# Patient Record
Sex: MALE | Race: WHITE | Employment: OTHER | URBAN - METROPOLITAN AREA
[De-identification: names, ages, dates, MRNs, and addresses within clinical notes are randomized per-mention and may not be internally consistent; named-entity substitution may affect disease eponyms.]

---

## 2022-08-01 ENCOUNTER — APPOINTMENT (OUTPATIENT)
Dept: GENERAL RADIOLOGY | Age: 47
DRG: 315 | End: 2022-08-01
Payer: COMMERCIAL

## 2022-08-01 ENCOUNTER — ANESTHESIA (OUTPATIENT)
Dept: OPERATING ROOM | Age: 47
DRG: 315 | End: 2022-08-01
Payer: COMMERCIAL

## 2022-08-01 ENCOUNTER — HOSPITAL ENCOUNTER (INPATIENT)
Age: 47
LOS: 1 days | Discharge: HOME | DRG: 315 | End: 2022-08-02
Attending: EMERGENCY MEDICINE | Admitting: ORTHOPAEDIC SURGERY
Payer: COMMERCIAL

## 2022-08-01 ENCOUNTER — ANESTHESIA EVENT (OUTPATIENT)
Dept: OPERATING ROOM | Age: 47
DRG: 315 | End: 2022-08-01
Payer: COMMERCIAL

## 2022-08-01 DIAGNOSIS — S62.609B OPEN FRACTURE OF PHALANX OF DIGIT OF HAND, INITIAL ENCOUNTER: Primary | ICD-10-CM

## 2022-08-01 DIAGNOSIS — S61.401A DEGLOVING INJURY OF RIGHT HAND, INITIAL ENCOUNTER: ICD-10-CM

## 2022-08-01 PROBLEM — S62.91XB: Status: ACTIVE | Noted: 2022-08-01

## 2022-08-01 PROBLEM — S62.306B: Status: ACTIVE | Noted: 2022-08-01

## 2022-08-01 PROBLEM — S62.314B: Status: ACTIVE | Noted: 2022-08-01

## 2022-08-01 LAB
ABO/RH: NORMAL
ANTIBODY SCREEN: NORMAL
HCT VFR BLD CALC: 44.2 % (ref 37–54)
HEMOGLOBIN: 14.9 G/DL (ref 12.5–16.5)
MCH RBC QN AUTO: 31 PG (ref 26–35)
MCHC RBC AUTO-ENTMCNC: 33.7 % (ref 32–34.5)
MCV RBC AUTO: 91.9 FL (ref 80–99.9)
PDW BLD-RTO: 12 FL (ref 11.5–15)
PLATELET # BLD: 267 E9/L (ref 130–450)
PMV BLD AUTO: 9.2 FL (ref 7–12)
RBC # BLD: 4.81 E12/L (ref 3.8–5.8)
WBC # BLD: 18.4 E9/L (ref 4.5–11.5)

## 2022-08-01 PROCEDURE — 6360000002 HC RX W HCPCS: Performed by: EMERGENCY MEDICINE

## 2022-08-01 PROCEDURE — 13133 CMPLX RPR F/C/C/M/N/AX/G/H/F: CPT | Performed by: ORTHOPAEDIC SURGERY

## 2022-08-01 PROCEDURE — 7100000001 HC PACU RECOVERY - ADDTL 15 MIN: Performed by: ORTHOPAEDIC SURGERY

## 2022-08-01 PROCEDURE — 3600000002 HC SURGERY LEVEL 2 BASE: Performed by: ORTHOPAEDIC SURGERY

## 2022-08-01 PROCEDURE — 11012 DEB SKIN BONE AT FX SITE: CPT | Performed by: ORTHOPAEDIC SURGERY

## 2022-08-01 PROCEDURE — 26615 TREAT METACARPAL FRACTURE: CPT | Performed by: ORTHOPAEDIC SURGERY

## 2022-08-01 PROCEDURE — 0HQFXZZ REPAIR RIGHT HAND SKIN, EXTERNAL APPROACH: ICD-10-PCS | Performed by: ORTHOPAEDIC SURGERY

## 2022-08-01 PROCEDURE — 3700000001 HC ADD 15 MINUTES (ANESTHESIA): Performed by: ORTHOPAEDIC SURGERY

## 2022-08-01 PROCEDURE — 2720000010 HC SURG SUPPLY STERILE: Performed by: ORTHOPAEDIC SURGERY

## 2022-08-01 PROCEDURE — 86901 BLOOD TYPING SEROLOGIC RH(D): CPT

## 2022-08-01 PROCEDURE — 6360000002 HC RX W HCPCS

## 2022-08-01 PROCEDURE — 73110 X-RAY EXAM OF WRIST: CPT

## 2022-08-01 PROCEDURE — 6360000002 HC RX W HCPCS: Performed by: ANESTHESIOLOGY

## 2022-08-01 PROCEDURE — 6360000002 HC RX W HCPCS: Performed by: STUDENT IN AN ORGANIZED HEALTH CARE EDUCATION/TRAINING PROGRAM

## 2022-08-01 PROCEDURE — 1200000000 HC SEMI PRIVATE

## 2022-08-01 PROCEDURE — 26727 TREAT FINGER FRACTURE EACH: CPT | Performed by: ORTHOPAEDIC SURGERY

## 2022-08-01 PROCEDURE — C1713 ANCHOR/SCREW BN/BN,TIS/BN: HCPCS | Performed by: ORTHOPAEDIC SURGERY

## 2022-08-01 PROCEDURE — 0PSP04Z REPOSITION RIGHT METACARPAL WITH INTERNAL FIXATION DEVICE, OPEN APPROACH: ICD-10-PCS | Performed by: ORTHOPAEDIC SURGERY

## 2022-08-01 PROCEDURE — 86850 RBC ANTIBODY SCREEN: CPT

## 2022-08-01 PROCEDURE — 96375 TX/PRO/DX INJ NEW DRUG ADDON: CPT

## 2022-08-01 PROCEDURE — 7100000000 HC PACU RECOVERY - FIRST 15 MIN: Performed by: ORTHOPAEDIC SURGERY

## 2022-08-01 PROCEDURE — 3600000012 HC SURGERY LEVEL 2 ADDTL 15MIN: Performed by: ORTHOPAEDIC SURGERY

## 2022-08-01 PROCEDURE — 36415 COLL VENOUS BLD VENIPUNCTURE: CPT

## 2022-08-01 PROCEDURE — 2580000003 HC RX 258: Performed by: STUDENT IN AN ORGANIZED HEALTH CARE EDUCATION/TRAINING PROGRAM

## 2022-08-01 PROCEDURE — 0PST04Z REPOSITION RIGHT FINGER PHALANX WITH INTERNAL FIXATION DEVICE, OPEN APPROACH: ICD-10-PCS | Performed by: ORTHOPAEDIC SURGERY

## 2022-08-01 PROCEDURE — 3700000000 HC ANESTHESIA ATTENDED CARE: Performed by: ORTHOPAEDIC SURGERY

## 2022-08-01 PROCEDURE — 2709999900 HC NON-CHARGEABLE SUPPLY: Performed by: ORTHOPAEDIC SURGERY

## 2022-08-01 PROCEDURE — 2580000003 HC RX 258

## 2022-08-01 PROCEDURE — 99223 1ST HOSP IP/OBS HIGH 75: CPT | Performed by: ORTHOPAEDIC SURGERY

## 2022-08-01 PROCEDURE — 3209999900 FLUORO FOR SURGICAL PROCEDURES

## 2022-08-01 PROCEDURE — 2500000003 HC RX 250 WO HCPCS

## 2022-08-01 PROCEDURE — C1769 GUIDE WIRE: HCPCS | Performed by: ORTHOPAEDIC SURGERY

## 2022-08-01 PROCEDURE — 86900 BLOOD TYPING SEROLOGIC ABO: CPT

## 2022-08-01 PROCEDURE — 0MQ70ZZ REPAIR RIGHT HAND BURSA AND LIGAMENT, OPEN APPROACH: ICD-10-PCS | Performed by: ORTHOPAEDIC SURGERY

## 2022-08-01 PROCEDURE — 73130 X-RAY EXAM OF HAND: CPT

## 2022-08-01 PROCEDURE — 0PST34Z REPOSITION RIGHT FINGER PHALANX WITH INTERNAL FIXATION DEVICE, PERCUTANEOUS APPROACH: ICD-10-PCS | Performed by: ORTHOPAEDIC SURGERY

## 2022-08-01 PROCEDURE — 99285 EMERGENCY DEPT VISIT HI MDM: CPT

## 2022-08-01 PROCEDURE — 85027 COMPLETE CBC AUTOMATED: CPT

## 2022-08-01 PROCEDURE — 26540 REPAIR HAND JOINT: CPT | Performed by: ORTHOPAEDIC SURGERY

## 2022-08-01 PROCEDURE — 96374 THER/PROPH/DIAG INJ IV PUSH: CPT

## 2022-08-01 PROCEDURE — 13132 CMPLX RPR F/C/C/M/N/AX/G/H/F: CPT | Performed by: ORTHOPAEDIC SURGERY

## 2022-08-01 DEVICE — IMPLANTABLE DEVICE: Type: IMPLANTABLE DEVICE | Site: HAND | Status: FUNCTIONAL

## 2022-08-01 DEVICE — SCREW BNE L13MM DIA2MM HND S STL ST LOK VAR ANG T6 STARDRV: Type: IMPLANTABLE DEVICE | Site: HAND | Status: FUNCTIONAL

## 2022-08-01 DEVICE — SCREW BNE L18MM DIA2MM HND 316L S STL ST VAR ANG LOK T6: Type: IMPLANTABLE DEVICE | Site: HAND | Status: FUNCTIONAL

## 2022-08-01 DEVICE — SCREW BNE L11MM DIA2MM CORT S STL ST LOK FULL THRD T6: Type: IMPLANTABLE DEVICE | Site: HAND | Status: FUNCTIONAL

## 2022-08-01 DEVICE — SCREW BNE L14MM DIA2MM HND 316L S STL ST VAR ANG LOK T6: Type: IMPLANTABLE DEVICE | Site: HAND | Status: FUNCTIONAL

## 2022-08-01 DEVICE — SCREW BNE L10MM DIA2MM CORT S STL ST LOK FULL THRD T6: Type: IMPLANTABLE DEVICE | Site: HAND | Status: FUNCTIONAL

## 2022-08-01 RX ORDER — LIDOCAINE HYDROCHLORIDE 20 MG/ML
INJECTION, SOLUTION INTRAVENOUS PRN
Status: DISCONTINUED | OUTPATIENT
Start: 2022-08-01 | End: 2022-08-01 | Stop reason: SDUPTHER

## 2022-08-01 RX ORDER — ESMOLOL HYDROCHLORIDE 10 MG/ML
INJECTION INTRAVENOUS PRN
Status: DISCONTINUED | OUTPATIENT
Start: 2022-08-01 | End: 2022-08-01 | Stop reason: SDUPTHER

## 2022-08-01 RX ORDER — TETANUS AND DIPHTHERIA TOXOIDS ADSORBED 2; 2 [LF]/.5ML; [LF]/.5ML
0.5 INJECTION INTRAMUSCULAR ONCE
Status: DISCONTINUED | OUTPATIENT
Start: 2022-08-01 | End: 2022-08-02 | Stop reason: HOSPADM

## 2022-08-01 RX ORDER — NEOSTIGMINE METHYLSULFATE 1 MG/ML
INJECTION, SOLUTION INTRAVENOUS PRN
Status: DISCONTINUED | OUTPATIENT
Start: 2022-08-01 | End: 2022-08-01 | Stop reason: SDUPTHER

## 2022-08-01 RX ORDER — SODIUM CHLORIDE 9 MG/ML
INJECTION, SOLUTION INTRAVENOUS CONTINUOUS PRN
Status: DISCONTINUED | OUTPATIENT
Start: 2022-08-01 | End: 2022-08-01 | Stop reason: SDUPTHER

## 2022-08-01 RX ORDER — OXYCODONE HYDROCHLORIDE AND ACETAMINOPHEN 5; 325 MG/1; MG/1
1 TABLET ORAL EVERY 6 HOURS PRN
Qty: 28 TABLET | Refills: 0 | Status: SHIPPED | OUTPATIENT
Start: 2022-08-01 | End: 2022-08-16 | Stop reason: SDUPTHER

## 2022-08-01 RX ORDER — FENTANYL CITRATE 50 UG/ML
INJECTION, SOLUTION INTRAMUSCULAR; INTRAVENOUS PRN
Status: DISCONTINUED | OUTPATIENT
Start: 2022-08-01 | End: 2022-08-01 | Stop reason: SDUPTHER

## 2022-08-01 RX ORDER — SODIUM CHLORIDE 0.9 % (FLUSH) 0.9 %
5-40 SYRINGE (ML) INJECTION EVERY 12 HOURS SCHEDULED
Status: DISCONTINUED | OUTPATIENT
Start: 2022-08-01 | End: 2022-08-02 | Stop reason: HOSPADM

## 2022-08-01 RX ORDER — FENTANYL CITRATE 50 UG/ML
50 INJECTION, SOLUTION INTRAMUSCULAR; INTRAVENOUS
Status: DISCONTINUED | OUTPATIENT
Start: 2022-08-01 | End: 2022-08-02 | Stop reason: HOSPADM

## 2022-08-01 RX ORDER — GLYCOPYRROLATE 0.2 MG/ML
INJECTION INTRAMUSCULAR; INTRAVENOUS PRN
Status: DISCONTINUED | OUTPATIENT
Start: 2022-08-01 | End: 2022-08-01 | Stop reason: SDUPTHER

## 2022-08-01 RX ORDER — ONDANSETRON 2 MG/ML
4 INJECTION INTRAMUSCULAR; INTRAVENOUS
Status: ACTIVE | OUTPATIENT
Start: 2022-08-01 | End: 2022-08-01

## 2022-08-01 RX ORDER — PROPOFOL 10 MG/ML
INJECTION, EMULSION INTRAVENOUS PRN
Status: DISCONTINUED | OUTPATIENT
Start: 2022-08-01 | End: 2022-08-01 | Stop reason: SDUPTHER

## 2022-08-01 RX ORDER — DEXAMETHASONE SODIUM PHOSPHATE 10 MG/ML
INJECTION INTRAMUSCULAR; INTRAVENOUS PRN
Status: DISCONTINUED | OUTPATIENT
Start: 2022-08-01 | End: 2022-08-01 | Stop reason: SDUPTHER

## 2022-08-01 RX ORDER — SODIUM CHLORIDE 0.9 % (FLUSH) 0.9 %
5-40 SYRINGE (ML) INJECTION PRN
Status: DISCONTINUED | OUTPATIENT
Start: 2022-08-01 | End: 2022-08-02 | Stop reason: HOSPADM

## 2022-08-01 RX ORDER — ROCURONIUM BROMIDE 10 MG/ML
INJECTION, SOLUTION INTRAVENOUS PRN
Status: DISCONTINUED | OUTPATIENT
Start: 2022-08-01 | End: 2022-08-01 | Stop reason: SDUPTHER

## 2022-08-01 RX ORDER — MORPHINE SULFATE 4 MG/ML
4 INJECTION, SOLUTION INTRAMUSCULAR; INTRAVENOUS ONCE
Status: COMPLETED | OUTPATIENT
Start: 2022-08-01 | End: 2022-08-01

## 2022-08-01 RX ORDER — ONDANSETRON 2 MG/ML
INJECTION INTRAMUSCULAR; INTRAVENOUS PRN
Status: DISCONTINUED | OUTPATIENT
Start: 2022-08-01 | End: 2022-08-01 | Stop reason: SDUPTHER

## 2022-08-01 RX ORDER — FENTANYL CITRATE 50 UG/ML
INJECTION, SOLUTION INTRAMUSCULAR; INTRAVENOUS
Status: COMPLETED
Start: 2022-08-01 | End: 2022-08-01

## 2022-08-01 RX ORDER — CEFAZOLIN SODIUM 1 G/3ML
INJECTION, POWDER, FOR SOLUTION INTRAMUSCULAR; INTRAVENOUS PRN
Status: DISCONTINUED | OUTPATIENT
Start: 2022-08-01 | End: 2022-08-01 | Stop reason: SDUPTHER

## 2022-08-01 RX ORDER — SUCCINYLCHOLINE/SOD CL,ISO/PF 200MG/10ML
SYRINGE (ML) INTRAVENOUS PRN
Status: DISCONTINUED | OUTPATIENT
Start: 2022-08-01 | End: 2022-08-01 | Stop reason: SDUPTHER

## 2022-08-01 RX ORDER — SODIUM CHLORIDE 9 MG/ML
INJECTION, SOLUTION INTRAVENOUS PRN
Status: DISCONTINUED | OUTPATIENT
Start: 2022-08-01 | End: 2022-08-02 | Stop reason: HOSPADM

## 2022-08-01 RX ORDER — MIDAZOLAM HYDROCHLORIDE 1 MG/ML
INJECTION INTRAMUSCULAR; INTRAVENOUS PRN
Status: DISCONTINUED | OUTPATIENT
Start: 2022-08-01 | End: 2022-08-01 | Stop reason: SDUPTHER

## 2022-08-01 RX ADMIN — PROPOFOL 200 MG: 10 INJECTION, EMULSION INTRAVENOUS at 20:42

## 2022-08-01 RX ADMIN — MIDAZOLAM 2 MG: 1 INJECTION INTRAMUSCULAR; INTRAVENOUS at 20:32

## 2022-08-01 RX ADMIN — CEFAZOLIN 2000 MG: 1 INJECTION, POWDER, FOR SOLUTION INTRAMUSCULAR; INTRAVENOUS at 20:42

## 2022-08-01 RX ADMIN — SODIUM CHLORIDE: 9 INJECTION, SOLUTION INTRAVENOUS at 20:32

## 2022-08-01 RX ADMIN — GLYCOPYRROLATE 0.6 MG: 0.2 INJECTION, SOLUTION INTRAMUSCULAR; INTRAVENOUS at 22:08

## 2022-08-01 RX ADMIN — ROCURONIUM BROMIDE 30 MG: 10 INJECTION, SOLUTION INTRAVENOUS at 20:48

## 2022-08-01 RX ADMIN — ONDANSETRON 4 MG: 2 INJECTION INTRAMUSCULAR; INTRAVENOUS at 21:57

## 2022-08-01 RX ADMIN — FENTANYL CITRATE 50 MCG: 50 INJECTION, SOLUTION INTRAMUSCULAR; INTRAVENOUS at 17:59

## 2022-08-01 RX ADMIN — Medication 140 MG: at 20:42

## 2022-08-01 RX ADMIN — PHENYLEPHRINE HYDROCHLORIDE 100 MCG: 10 INJECTION INTRAVENOUS at 21:39

## 2022-08-01 RX ADMIN — FENTANYL CITRATE 150 MCG: 50 INJECTION, SOLUTION INTRAMUSCULAR; INTRAVENOUS at 20:46

## 2022-08-01 RX ADMIN — LIDOCAINE HYDROCHLORIDE 100 MG: 20 INJECTION, SOLUTION INTRAVENOUS at 20:42

## 2022-08-01 RX ADMIN — HYDROMORPHONE HYDROCHLORIDE 0.5 MG: 1 INJECTION, SOLUTION INTRAMUSCULAR; INTRAVENOUS; SUBCUTANEOUS at 23:27

## 2022-08-01 RX ADMIN — CEFAZOLIN 2000 MG: 2 INJECTION, POWDER, FOR SOLUTION INTRAMUSCULAR; INTRAVENOUS at 17:03

## 2022-08-01 RX ADMIN — PHENYLEPHRINE HYDROCHLORIDE 100 MCG: 10 INJECTION INTRAVENOUS at 21:46

## 2022-08-01 RX ADMIN — DEXAMETHASONE SODIUM PHOSPHATE 10 MG: 10 INJECTION INTRAMUSCULAR; INTRAVENOUS at 20:42

## 2022-08-01 RX ADMIN — FENTANYL CITRATE 100 MCG: 50 INJECTION, SOLUTION INTRAMUSCULAR; INTRAVENOUS at 20:36

## 2022-08-01 RX ADMIN — MORPHINE SULFATE 4 MG: 4 INJECTION, SOLUTION INTRAMUSCULAR; INTRAVENOUS at 17:00

## 2022-08-01 RX ADMIN — HYDROMORPHONE HYDROCHLORIDE 0.5 MG: 1 INJECTION, SOLUTION INTRAMUSCULAR; INTRAVENOUS; SUBCUTANEOUS at 23:34

## 2022-08-01 RX ADMIN — ROCURONIUM BROMIDE 10 MG: 10 INJECTION, SOLUTION INTRAVENOUS at 20:42

## 2022-08-01 RX ADMIN — Medication 3 MG: at 22:08

## 2022-08-01 RX ADMIN — ESMOLOL HYDROCHLORIDE 50 MG: 10 INJECTION, SOLUTION INTRAVENOUS at 20:47

## 2022-08-01 ASSESSMENT — ENCOUNTER SYMPTOMS
SINUS PRESSURE: 0
WHEEZING: 0
COUGH: 0
EYE REDNESS: 0
VOMITING: 0
SORE THROAT: 0
ABDOMINAL PAIN: 0
EYE PAIN: 0
NAUSEA: 0
DIARRHEA: 0
EYE DISCHARGE: 0
SHORTNESS OF BREATH: 0
BACK PAIN: 0

## 2022-08-01 ASSESSMENT — PAIN DESCRIPTION - LOCATION: LOCATION: HAND

## 2022-08-01 ASSESSMENT — PAIN DESCRIPTION - FREQUENCY: FREQUENCY: INTERMITTENT

## 2022-08-01 ASSESSMENT — PAIN DESCRIPTION - PAIN TYPE: TYPE: SURGICAL PAIN

## 2022-08-01 ASSESSMENT — PAIN DESCRIPTION - ONSET: ONSET: ON-GOING

## 2022-08-01 ASSESSMENT — PAIN SCALES - GENERAL
PAINLEVEL_OUTOF10: 9
PAINLEVEL_OUTOF10: 10
PAINLEVEL_OUTOF10: 0
PAINLEVEL_OUTOF10: 0

## 2022-08-01 ASSESSMENT — PAIN DESCRIPTION - DESCRIPTORS: DESCRIPTORS: ACHING;BURNING;CRAMPING

## 2022-08-01 ASSESSMENT — PAIN DESCRIPTION - ORIENTATION: ORIENTATION: RIGHT

## 2022-08-01 NOTE — LETTER
[address] [address] [facility]  Phone: [phone]  Fax: [phone]    Dr. Anselmo Molina      August 2, 2022     Patient: Umu Mcgowan   YOB: 1975   Date of Visit: 8/1/2022       To Whom It May Concern:    Patients father was admitted for medical reason, son was required to stay with father due to severity and location. If you have any questions or concerns, please don't hesitate to call.     Sincerely,        Dr. Anselmo Song
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
## 2022-08-01 NOTE — ANESTHESIA PRE PROCEDURE
Department of Anesthesiology  Preprocedure Note       Name:  Taz Mancia   Age:  48 y.o.  :  1975                                          MRN:  [id number]         Date:  2022      Surgeon: Raeann Signs):  Shayy Fedler MD    Procedure: Procedure(s):  HAND INCISION AND DRAINAGE  HAND OPEN REDUCTION INTERNAL FIXATION    Medications prior to admission:   Prior to Admission medications    Medication Sig Start Date End Date Taking? Authorizing Provider   lisinopril (PRINIVIL;ZESTRIL) 20 MG tablet Take 20 mg by mouth daily    Historical Provider, MD   pantoprazole (PROTONIX) 40 MG tablet Take 40 mg by mouth daily    Historical Provider, MD       Current medications:    Current Facility-Administered Medications   Medication Dose Route Frequency Provider Last Rate Last Admin    diptheria-tetanus toxoids [facility]) 2-2 LF/0.5ML injection 0.5 mL  0.5 mL IntraMUSCular Once Alan Sole, DO        fentaNYL (SUBLIMAZE) injection 50 mcg  50 mcg IntraVENous Q1H PRN Jane Valdezo, DO   50 mcg at 22 4263     Current Outpatient Medications   Medication Sig Dispense Refill    lisinopril (PRINIVIL;ZESTRIL) 20 MG tablet Take 20 mg by mouth daily      pantoprazole (PROTONIX) 40 MG tablet Take 40 mg by mouth daily         Allergies:  No Known Allergies    Problem List:  There is no problem list on file for this patient. Past Medical History:        Diagnosis Date    GERD (gastroesophageal reflux disease)     Hypertension     Kidney stone     MI, old        Past Surgical History:  No past surgical history on file.     Social History:    Social History     Tobacco Use    Smoking status: Never    Smokeless tobacco: Not on file   Substance Use Topics    Alcohol use: Yes     Comment: rarely                                Counseling given: Not Answered      Vital Signs (Current):   Vitals:    22 1627 22 1629 22 1802   BP: (!) 140/93     Pulse: 83  73   Resp: 18     Temp: 36.8 °C (98.3 °F) SpO2: 95%  97%   Weight:  265 lb (120.2 kg)    Height:  5' 11\" (1.803 m)                                               BP Readings from Last 3 Encounters:   08/01/22 (!) 140/93   12/03/16 (!) 137/94       NPO Status:  1300 8/1/22                                                                               BMI:   Wt Readings from Last 3 Encounters:   08/01/22 265 lb (120.2 kg)   12/03/16 246 lb (111.6 kg)     Body mass index is 36.96 kg/m². CBC:   Lab Results   Component Value Date/Time    WBC 11.1 12/03/2016 09:30 PM    RBC 5.07 12/03/2016 09:30 PM    HGB 15.7 12/03/2016 09:30 PM    HCT 45.7 12/03/2016 09:30 PM    MCV 90.2 12/03/2016 09:30 PM    RDW 11.9 12/03/2016 09:30 PM     12/03/2016 09:30 PM       CMP:   Lab Results   Component Value Date/Time     12/03/2016 09:30 PM    K 3.7 12/03/2016 09:30 PM     12/03/2016 09:30 PM    CO2 23 12/03/2016 09:30 PM    BUN 13 12/03/2016 09:30 PM    CREATININE 1.02 12/03/2016 09:30 PM    GFRAA >60.0 12/03/2016 09:30 PM    LABGLOM >60.0 12/03/2016 09:30 PM    GLUCOSE 157 12/03/2016 09:30 PM    PROT 7.4 12/03/2016 09:30 PM    CALCIUM 9.3 12/03/2016 09:30 PM    BILITOT 0.3 12/03/2016 09:30 PM    ALKPHOS 91 12/03/2016 09:30 PM    AST 17 12/03/2016 09:30 PM    ALT 38 12/03/2016 09:30 PM       POC Tests: No results for input(s): POCGLU, POCNA, POCK, POCCL, POCBUN, POCHEMO, POCHCT in the last 72 hours. Coags: No results found for: PROTIME, INR, APTT    HCG (If Applicable): No results found for: PREGTESTUR, PREGSERUM, HCG, HCGQUANT     ABGs: No results found for: PHART, PO2ART, LDV4DNB, RXD6SOO, BEART, N5FKLDHK     Type & Screen (If Applicable):  No results found for: LABABO, LABRH    Drug/Infectious Status (If Applicable):  No results found for: HIV, HEPCAB    COVID-19 Screening (If Applicable): No results found for: COVID19        Anesthesia Evaluation  Patient summary reviewed   History of anesthetic complications: No previous anesthesa.   Airway: Mallampati: IV  TM distance: >3 FB     Mouth opening: > = 3 FB   Dental:      Comment: Denies loose teeth      Pulmonary:normal exam    (+) sleep apnea: on CPAP,                             Cardiovascular:  Exercise tolerance: good (>4 METS),   (+) hypertension:,     Past MI: patient denies. Rhythm: regular  Rate: normal           Beta Blocker:  Not on Beta Blocker         Neuro/Psych:   Negative Neuro/Psych ROS              GI/Hepatic/Renal:   (+) GERD: well controlled,           Endo/Other: Negative Endo/Other ROS                    Abdominal:   (+) obese,           Vascular: negative vascular ROS. Other Findings:           Anesthesia Plan      general     ASA 3       Induction: intravenous. MIPS: Prophylactic antiemetics administered. Anesthetic plan and risks discussed with patient. Use of blood products discussed with patient whom consented to blood products. Plan discussed with CRNA and attending.                     Himanshu Mosher RN   8/1/2022
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
## 2022-08-02 VITALS
RESPIRATION RATE: 16 BRPM | BODY MASS INDEX: 37.1 KG/M2 | TEMPERATURE: 98.5 F | WEIGHT: 265 LBS | DIASTOLIC BLOOD PRESSURE: 77 MMHG | OXYGEN SATURATION: 91 % | HEIGHT: 71 IN | SYSTOLIC BLOOD PRESSURE: 145 MMHG | HEART RATE: 117 BPM

## 2022-08-02 LAB
ANION GAP SERPL CALCULATED.3IONS-SCNC: 13 MMOL/L (ref 7–16)
BASOPHILS ABSOLUTE: 0.02 E9/L (ref 0–0.2)
BASOPHILS RELATIVE PERCENT: 0.1 % (ref 0–2)
BUN BLDV-MCNC: 14 MG/DL (ref 6–20)
CALCIUM SERPL-MCNC: 8.8 MG/DL (ref 8.6–10.2)
CHLORIDE BLD-SCNC: 107 MMOL/L (ref 98–107)
CO2: 18 MMOL/L (ref 22–29)
CREAT SERPL-MCNC: 1.1 MG/DL (ref 0.7–1.2)
EOSINOPHILS ABSOLUTE: 0 E9/L (ref 0.05–0.5)
EOSINOPHILS RELATIVE PERCENT: 0 % (ref 0–6)
GFR AFRICAN AMERICAN: >60
GFR NON-AFRICAN AMERICAN: >60 ML/MIN/1.73
GLUCOSE BLD-MCNC: 178 MG/DL (ref 74–99)
HCT VFR BLD CALC: 42.2 % (ref 37–54)
HEMOGLOBIN: 14.1 G/DL (ref 12.5–16.5)
IMMATURE GRANULOCYTES #: 0.11 E9/L
IMMATURE GRANULOCYTES %: 0.6 % (ref 0–5)
LYMPHOCYTES ABSOLUTE: 0.75 E9/L (ref 1.5–4)
LYMPHOCYTES RELATIVE PERCENT: 4.2 % (ref 20–42)
MCH RBC QN AUTO: 30.9 PG (ref 26–35)
MCHC RBC AUTO-ENTMCNC: 33.4 % (ref 32–34.5)
MCV RBC AUTO: 92.5 FL (ref 80–99.9)
MONOCYTES ABSOLUTE: 0.58 E9/L (ref 0.1–0.95)
MONOCYTES RELATIVE PERCENT: 3.2 % (ref 2–12)
NEUTROPHILS ABSOLUTE: 16.59 E9/L (ref 1.8–7.3)
NEUTROPHILS RELATIVE PERCENT: 91.9 % (ref 43–80)
PDW BLD-RTO: 12.2 FL (ref 11.5–15)
PLATELET # BLD: 289 E9/L (ref 130–450)
PMV BLD AUTO: 9.3 FL (ref 7–12)
POTASSIUM REFLEX MAGNESIUM: 4.9 MMOL/L (ref 3.5–5)
RBC # BLD: 4.56 E12/L (ref 3.8–5.8)
SODIUM BLD-SCNC: 138 MMOL/L (ref 132–146)
WBC # BLD: 18.1 E9/L (ref 4.5–11.5)

## 2022-08-02 PROCEDURE — 97530 THERAPEUTIC ACTIVITIES: CPT

## 2022-08-02 PROCEDURE — 97535 SELF CARE MNGMENT TRAINING: CPT

## 2022-08-02 PROCEDURE — 6370000000 HC RX 637 (ALT 250 FOR IP)

## 2022-08-02 PROCEDURE — 85025 COMPLETE CBC W/AUTO DIFF WBC: CPT

## 2022-08-02 PROCEDURE — 80048 BASIC METABOLIC PNL TOTAL CA: CPT

## 2022-08-02 PROCEDURE — 36415 COLL VENOUS BLD VENIPUNCTURE: CPT

## 2022-08-02 PROCEDURE — 6360000002 HC RX W HCPCS: Performed by: STUDENT IN AN ORGANIZED HEALTH CARE EDUCATION/TRAINING PROGRAM

## 2022-08-02 PROCEDURE — 97161 PT EVAL LOW COMPLEX 20 MIN: CPT

## 2022-08-02 PROCEDURE — 2580000003 HC RX 258: Performed by: ORTHOPAEDIC SURGERY

## 2022-08-02 PROCEDURE — 6360000002 HC RX W HCPCS

## 2022-08-02 PROCEDURE — 97165 OT EVAL LOW COMPLEX 30 MIN: CPT

## 2022-08-02 PROCEDURE — 2580000003 HC RX 258: Performed by: STUDENT IN AN ORGANIZED HEALTH CARE EDUCATION/TRAINING PROGRAM

## 2022-08-02 RX ORDER — OXYCODONE HYDROCHLORIDE 5 MG/1
5 TABLET ORAL EVERY 4 HOURS PRN
Status: DISCONTINUED | OUTPATIENT
Start: 2022-08-02 | End: 2022-08-02 | Stop reason: HOSPADM

## 2022-08-02 RX ORDER — MORPHINE SULFATE 4 MG/ML
4 INJECTION, SOLUTION INTRAMUSCULAR; INTRAVENOUS
Status: DISCONTINUED | OUTPATIENT
Start: 2022-08-02 | End: 2022-08-02 | Stop reason: HOSPADM

## 2022-08-02 RX ORDER — ACETAMINOPHEN 325 MG/1
650 TABLET ORAL EVERY 6 HOURS SCHEDULED
Status: DISCONTINUED | OUTPATIENT
Start: 2022-08-02 | End: 2022-08-02 | Stop reason: HOSPADM

## 2022-08-02 RX ORDER — SODIUM CHLORIDE 9 MG/ML
INJECTION, SOLUTION INTRAVENOUS PRN
Status: DISCONTINUED | OUTPATIENT
Start: 2022-08-02 | End: 2022-08-02 | Stop reason: HOSPADM

## 2022-08-02 RX ORDER — ONDANSETRON 2 MG/ML
4 INJECTION INTRAMUSCULAR; INTRAVENOUS EVERY 6 HOURS PRN
Status: DISCONTINUED | OUTPATIENT
Start: 2022-08-02 | End: 2022-08-02 | Stop reason: HOSPADM

## 2022-08-02 RX ORDER — PANTOPRAZOLE SODIUM 40 MG/1
40 TABLET, DELAYED RELEASE ORAL
Status: DISCONTINUED | OUTPATIENT
Start: 2022-08-02 | End: 2022-08-02 | Stop reason: HOSPADM

## 2022-08-02 RX ORDER — SODIUM CHLORIDE 0.9 % (FLUSH) 0.9 %
5-40 SYRINGE (ML) INJECTION 2 TIMES DAILY
Status: DISCONTINUED | OUTPATIENT
Start: 2022-08-02 | End: 2022-08-02 | Stop reason: HOSPADM

## 2022-08-02 RX ORDER — OXYCODONE HYDROCHLORIDE 10 MG/1
10 TABLET ORAL EVERY 4 HOURS PRN
Status: DISCONTINUED | OUTPATIENT
Start: 2022-08-02 | End: 2022-08-02 | Stop reason: HOSPADM

## 2022-08-02 RX ORDER — SODIUM CHLORIDE 0.9 % (FLUSH) 0.9 %
5-40 SYRINGE (ML) INJECTION EVERY 12 HOURS SCHEDULED
Status: DISCONTINUED | OUTPATIENT
Start: 2022-08-02 | End: 2022-08-02 | Stop reason: SDUPTHER

## 2022-08-02 RX ORDER — SODIUM CHLORIDE 0.9 % (FLUSH) 0.9 %
10 SYRINGE (ML) INJECTION EVERY 12 HOURS SCHEDULED
Status: DISCONTINUED | OUTPATIENT
Start: 2022-08-02 | End: 2022-08-02 | Stop reason: SDUPTHER

## 2022-08-02 RX ORDER — SODIUM CHLORIDE 0.9 % (FLUSH) 0.9 %
10 SYRINGE (ML) INJECTION PRN
Status: DISCONTINUED | OUTPATIENT
Start: 2022-08-02 | End: 2022-08-02 | Stop reason: SDUPTHER

## 2022-08-02 RX ORDER — SODIUM CHLORIDE 0.9 % (FLUSH) 0.9 %
5-40 SYRINGE (ML) INJECTION PRN
Status: DISCONTINUED | OUTPATIENT
Start: 2022-08-02 | End: 2022-08-02 | Stop reason: HOSPADM

## 2022-08-02 RX ORDER — CEPHALEXIN 500 MG/1
500 CAPSULE ORAL 4 TIMES DAILY
Qty: 40 CAPSULE | Refills: 0 | Status: SHIPPED | OUTPATIENT
Start: 2022-08-02 | End: 2022-08-12

## 2022-08-02 RX ORDER — MORPHINE SULFATE 2 MG/ML
2 INJECTION, SOLUTION INTRAMUSCULAR; INTRAVENOUS
Status: DISCONTINUED | OUTPATIENT
Start: 2022-08-02 | End: 2022-08-02 | Stop reason: HOSPADM

## 2022-08-02 RX ORDER — SODIUM CHLORIDE 9 MG/ML
INJECTION, SOLUTION INTRAVENOUS PRN
Status: DISCONTINUED | OUTPATIENT
Start: 2022-08-02 | End: 2022-08-02 | Stop reason: SDUPTHER

## 2022-08-02 RX ORDER — LISINOPRIL 20 MG/1
20 TABLET ORAL DAILY
Status: DISCONTINUED | OUTPATIENT
Start: 2022-08-02 | End: 2022-08-02 | Stop reason: HOSPADM

## 2022-08-02 RX ORDER — ONDANSETRON 4 MG/1
4 TABLET, ORALLY DISINTEGRATING ORAL EVERY 8 HOURS PRN
Status: DISCONTINUED | OUTPATIENT
Start: 2022-08-02 | End: 2022-08-02 | Stop reason: HOSPADM

## 2022-08-02 RX ADMIN — SODIUM CHLORIDE, PRESERVATIVE FREE 10 ML: 5 INJECTION INTRAVENOUS at 01:03

## 2022-08-02 RX ADMIN — ACETAMINOPHEN 650 MG: 325 TABLET, FILM COATED ORAL at 05:52

## 2022-08-02 RX ADMIN — CEFAZOLIN 3000 MG: 10 INJECTION, POWDER, FOR SOLUTION INTRAVENOUS at 01:12

## 2022-08-02 RX ADMIN — LISINOPRIL 20 MG: 20 TABLET ORAL at 08:40

## 2022-08-02 RX ADMIN — CEFAZOLIN 3000 MG: 10 INJECTION, POWDER, FOR SOLUTION INTRAVENOUS at 08:41

## 2022-08-02 RX ADMIN — ACETAMINOPHEN 650 MG: 325 TABLET, FILM COATED ORAL at 01:02

## 2022-08-02 RX ADMIN — PANTOPRAZOLE SODIUM 40 MG: 40 TABLET, DELAYED RELEASE ORAL at 05:52

## 2022-08-02 RX ADMIN — MORPHINE SULFATE 4 MG: 4 INJECTION, SOLUTION INTRAMUSCULAR; INTRAVENOUS at 01:03

## 2022-08-02 RX ADMIN — MORPHINE SULFATE 4 MG: 4 INJECTION, SOLUTION INTRAMUSCULAR; INTRAVENOUS at 05:52

## 2022-08-02 RX ADMIN — SODIUM CHLORIDE, PRESERVATIVE FREE 10 ML: 5 INJECTION INTRAVENOUS at 08:40

## 2022-08-02 RX ADMIN — SODIUM CHLORIDE, PRESERVATIVE FREE 10 ML: 5 INJECTION INTRAVENOUS at 01:12

## 2022-08-02 ASSESSMENT — PAIN DESCRIPTION - PAIN TYPE
TYPE: SURGICAL PAIN
TYPE: SURGICAL PAIN

## 2022-08-02 ASSESSMENT — PAIN DESCRIPTION - ORIENTATION
ORIENTATION: RIGHT
ORIENTATION: RIGHT

## 2022-08-02 ASSESSMENT — PAIN DESCRIPTION - ONSET
ONSET: ON-GOING
ONSET: ON-GOING

## 2022-08-02 ASSESSMENT — PAIN DESCRIPTION - FREQUENCY
FREQUENCY: CONTINUOUS
FREQUENCY: CONTINUOUS

## 2022-08-02 ASSESSMENT — PAIN - FUNCTIONAL ASSESSMENT
PAIN_FUNCTIONAL_ASSESSMENT: ACTIVITIES ARE NOT PREVENTED
PAIN_FUNCTIONAL_ASSESSMENT: ACTIVITIES ARE NOT PREVENTED

## 2022-08-02 ASSESSMENT — PAIN DESCRIPTION - LOCATION
LOCATION: HAND
LOCATION: HAND

## 2022-08-02 ASSESSMENT — PAIN SCALES - GENERAL
PAINLEVEL_OUTOF10: 7
PAINLEVEL_OUTOF10: 5
PAINLEVEL_OUTOF10: 8

## 2022-08-02 NOTE — PROGRESS NOTES
Physical Therapy    Room #:  1149/2052-V  Patient Name: Douglas Iyer  Referring Provider:   Site: [address] [place] [address]  Ph: [phone] Fax: [phone]  Address: [address] [place] [address] Ordering User: Britt Odonnell DO  Provider ID: [id number]  NPI:  [id number]      PHYSICAL THERAPY INITIAL EVALUATION    Plan of care: No skilled needs identified; will discharge from services. If condition changes, please reorder physical therapy. Placement recommendation: home  Equipment recommendation:  None        AM-PAC Basic Mobility   23/24          Order:  EVALUATE AND TREAT  Diagnosis/Problem list:      Patient Active Problem List   Diagnosis    Right hand fracture, open, initial encounter    Open displaced fracture of fifth metacarpal bone of right hand    Open displaced fracture of base of fourth metacarpal bone of right hand    Degloving injury of right hand       Past medical history:       Diagnosis Date    GERD (gastroesophageal reflux disease)     Hypertension     Kidney stone     MI, old    ;      Procedure Laterality Date    HAND SURGERY Right 8/1/2022    HAND OPEN REDUCTION INTERNAL FIXATION,  INCISION AND DRAINAGE performed by Ella Mariee MD at [address] [place] [address]       The admitting diagnosis and active problem list as listed above have been reviewed prior to the initiation of this evaluation. Precautions:   NWB R UE  Social history: Patient lives with son  in a ranch home  with 3 steps to enter with Rail    Prior Level of Function: Patient ambulated independently PTA. Equipment owned: None,      Mentation: alert, cooperative, oriented x 3 and follows directions. ROM: wfl   STRENGTH: wfl   PAIN: (measured on a visual analog scale with 0=no pain and 10=excruciating pain) 0/10. Pt reported R UE numb.     FUNCTIONAL ASSESSMENT   Bed Mobility- Supine to sit- Independent      Scooting-Independent     Sit to supine- Independent    Transfers-Sit to stand-  Independent Gait:  Patient ambulated 15 feet using no device with  Independent     Steps:  10 steps no rail SBA   Balance: sit-good       stand good     Edema: \no    Endurance: good     Treatment: Therapist educated and facilitated patient on techniques to increase safety and independence with bed mobility, balance, functional transfers, and functional mobility. Sat EOB x 5 minutes to increase dynamic sitting balance and activity tolerance. Patient ambulated to the stair well and reciprocially ascended up a full fight of stairs with no rail independently. . Patient demonstrating good understanding of education/techniques. At end of session, patient in bed with  call light and phone within reach,  all lines and tubes intact, nursing notified. Rehab Potential: good  Patients Goal: home    ASSESSMENT  No PT needs at this time. Nursing to ambulate patient every shift.        Evaluation Complexity: Low Complexity PT evaluation (21166)     Time in: 0820  Time out: 0840    CPT codes:  Therapeutic activities (00881)   10 minutes  1 unit(s)      José Miguel Perfect, SPT  Ton Grumbles, PT
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
## 2022-08-02 NOTE — PROGRESS NOTES
Department of Orthopedic Surgery  Progress Note    Patient seen and examined. Resting comfortably at bedside. Pain controlled. No new complaints. Patient is very thankful that his hand was able to be saved. Denies chest pain, shortness of breath, dizziness/lightheadedness. Patient is right hand dominant. VITALS:  BP (!) 128/98   Pulse 94   Temp 97.5 °F (36.4 °C) (Temporal)   Resp 16   Ht 5' 11\" (1.803 m)   Wt 265 lb (120.2 kg)   SpO2 95%   BMI 36.96 kg/m²     General: alert and oriented to person, place and time, well-developed and well-nourished, in no acute distress    MUSCULOSKELETAL:   right upper extremity:  Dressing C/D/I  Compartments soft and compressible   Cap refill < 2 seconds right thumb   Patient has sensation to all 5 fingertips   He reported that sensation to fingertips did feel slightly altered when palpated. As though they were asleep. CBC:   Lab Results   Component Value Date/Time    WBC 18.1 08/02/2022 04:44 AM    HGB 14.1 08/02/2022 04:44 AM    HCT 42.2 08/02/2022 04:44 AM     08/02/2022 04:44 AM     PT/INR:  No results found for: PROTIME, INR      ASSESSMENT  S/P on 8/2/22   1. Excisional debridement of open fractures of fourth and fifth metacarpals. 2.  Open reduction internal fixation of fourth metacarpal   3. Open reduction and intramedullary fixation of fifth metacarpal   4. Closed reduction and intramedullary screw fixation of little finger proximal phalanx fracture   5. Repair of intermetacarpal ligament, index and middle finger, with open reduction and pinning.     PLAN      Continue physical therapy and protocol: NWB - Right UE  24 hour abx coverage  Deep venous thrombosis prophylaxis - early mobilization  PT/OT  Pain Control: IV and PO  Monitor H&H  D/C Plan:  Home       Electronically signed by Mis Early DO 8/2/2022 at 5:47 AM

## 2022-08-02 NOTE — BRIEF OP NOTE
Brief Postoperative Note      Patient: Nic Redmond  YOB: 1975  MRN: [id number]    Date of Procedure: 8/1/2022    Pre-Op Diagnosis: 4th and 5th metacarpal fractures open, right 5th proximal phalynx fracture open, 2nd webspace intermetacarpal ligament injury, instability. Post-Op Diagnosis: Same       Procedure(s):  HAND OPEN REDUCTION INTERNAL FIXATION,  INCISION AND DRAINAGE, Ligament repair    Surgeon(s):  Abelardo Berry MD    Assistant:  Resident: Chris Simmons DO    Anesthesia: General    Estimated Blood Loss (mL): less than 747     Complications: None    Specimens:   * No specimens in log *    Implants:  Implant Name Type Inv.  Item Serial No.  Lot No. LRB No. Used Action   PLATE BNE 2 MM CNDYL 2X6 HOLE STR - [id number]  PLATE BNE 2 MM CNDYL 2X6 HOLE STR [id number] [employer] [facility]  Right 1 Implanted   SCREW BNE L10MM DIA2MM JEANMARIE S [facility] [place] FULL THRD T6 - [id number]  SCREW BNE L10MM DIA2MM JEANMARIE S [facility]L [facility] [place] FULL THRD T6 [id number] DEPUY [employer] [facility]  Right 2 Implanted   SCREW BNE L11MM DIA2MM JEANMARIE S [facility] [place] FULL THRD T6 - [id number]  SCREW BNE L11MM DIA2MM JEANMARIE S [facility] [place] FULL THRD T6 [phone] [employer] [facility]  Right 1 Implanted   SCREW BNE L13MM DIA2MM HND S [facility]L [facility] [place] VU ANG T6 STARDRV - [id number]  SCREW BNE L13MM DIA2MM HND S STL [facility] [place] VU ANG T6 STARDRV [id number] DEPUY [employer] [facility]  Right 2 Implanted   SCREW BNE L14MM DIA2MM HND 316L S STL ST VU ANG [place] T6 - [id number]  SCREW BNE L14MM DIA2MM HND 316L S STL ST VU ANG [place] T6 [id number] [employer] [place]  Right 1 Implanted   SCREW BNE L15MM DIA2MM HND 316L S STL [facility] [place] [place] T6 - [id number]  SCREW BNE L15MM DIA2MM HND 316L S STL [facility] [place] [place] T6 [phone] DEPUY [employer] [facility]  Right 1 Implanted   SCREW BNE L18MM DIA2MM HND 316L S STL ST [place] ANG [place] T6 - [id number]  SCREW BNE L18MM DIA2MM HND 316L S STL [facility] [place] [place] T6 [phone] DEPUY [employer] [facility]  Right 1 Implanted   SCREW BNE SHT THRD 2.5X22 MM 6 MM KELLE HDLSS TI - [id number]  SCREW BNE SHT THRD 2.5X22 MM 6 MM KELLE HDLSS TI [phone] DEPU[employer]  Right 1 Implanted   SCREW BNE LNG THRD 4X52 MM 21 MM KELLE COMPR TI - [id number]  SCREW BNE LNG THRD 4X52 MM 21 MM KELLE COMPR TI [phone] DEPUY [employer]ES [facility]-  Right 1 Implanted         Drains: * No LDAs found *    Findings: See operative dictation    Electronically signed by Su Walls DO on 8/1/2022 at 10:39 PM
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
## 2022-08-02 NOTE — DISCHARGE INSTRUCTIONS
Department of Orthopaedic Hand  Dr. Mckayla Cook Discharge Instructions   Weight bearing Status - Non-weight bearing - on right upper Extremity  Pain medication Per Prescriptions  Contact Office for Medication Refill- [phone]  Office can refill pain med every 7 days  If patient discharging to facility then pain control will be continued per facility physician  Ice to operative/injured site for 15-30 minutes of each hour for next 5 days    Recommend that you continue to ice the area 2-3 times per day after this   Elevate operative/injured limb on 2 pillows at home  Goal is to have limb above the heart if able  Continue DVT Prophylaxis (blood clot prevention) as Prescribed: Early mobilization   Wound care - Keep dressing clean, dry and intact until follow up. Fracture Care -  Remain non-weight bearing to the RUE  Follow Up in Office in 2 weeks. Your first post op appointment is often with one of our PAs. Call the office at [phone] for directions or with any questions. Watch for these significant complications. Call physician if they or any other problems occur:  Fever over 101°, redness, swelling or warmth at the operative site  Unrelieved nausea    Foul smelling or cloudy drainage at the operative site   Unrelieved pain    Blood soaked dressing. (Some oozing may be normal)     Numb, pale, blue, cold or tingling extremity    No future appointments.

## 2022-08-02 NOTE — PROGRESS NOTES
PATIENT'S CLOTHING, SHOES, SILVER NECKLACE AND CELL PHONE PLACED IN BAG AND MARKED WITH PATIENT STICKER AND SENT TO PACU WITH PATIENT POST PROCEDURE - Lizz Victor RN.
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
## 2022-08-02 NOTE — PLAN OF CARE
Problem: Discharge Planning  Goal: Discharge to home or other facility with appropriate resources  Outcome: Progressing  Flowsheets (Taken 8/2/2022 0128)  Discharge to home or other facility with appropriate resources: Identify barriers to discharge with patient and caregiver     Problem: Pain  Goal: Verbalizes/displays adequate comfort level or baseline comfort level  Outcome: Progressing     Problem: ABCDS Injury Assessment  Goal: Absence of physical injury  Outcome: Progressing     Problem: Skin/Tissue Integrity - Adult  Goal: Incisions, wounds, or drain sites healing without S/S of infection  Outcome: Progressing     Problem: Musculoskeletal - Adult  Goal: Return mobility to safest level of function  Outcome: Not Progressing  Goal: Maintain proper alignment of affected body part  Outcome: Progressing  Goal: Return ADL status to a safe level of function  Outcome: Not Progressing     Problem: Infection - Adult  Goal: Absence of infection during hospitalization  Outcome: Progressing     Problem: Musculoskeletal - Adult  Goal: Return mobility to safest level of function  Outcome: Not Progressing  Goal: Return ADL status to a safe level of function  Outcome: Not Progressing

## 2022-08-02 NOTE — ANESTHESIA POSTPROCEDURE EVALUATION
Department of Anesthesiology  Postprocedure Note    Patient: Jasmina Nicolas  MRN: [id number]  YOB: 1975  Date of evaluation: 8/2/2022      Procedure Summary     Date: 08/01/22 Room / Location: [facility]  / [facility]    Anesthesia Start: 2032 Anesthesia Stop: 2250    Procedure: HAND OPEN REDUCTION INTERNAL FIXATION,  INCISION AND DRAINAGE (Right: Hand) Diagnosis:       Fracture      (4th and 5th metacarpal fractures open, right 5th proximal phalynx fracture open)    Surgeons: Carole Coburn MD Responsible Provider: Aguilar Mar MD    Anesthesia Type: general ASA Status: 3          Anesthesia Type: No value filed.     Abdulkadir Phase I: Abdulkadir Score: 8    Abdulkadir Phase II:        Anesthesia Post Evaluation    Patient location during evaluation: PACU  Patient participation: complete - patient participated  Level of consciousness: awake  Pain score: 3  Airway patency: patent  Nausea & Vomiting: no nausea  Complications: no  Cardiovascular status: hemodynamically stable  Respiratory status: acceptable  Hydration status: stable  Multimodal analgesia pain management approach

## 2022-08-02 NOTE — OP NOTE
[address]                  [address] [facility],  [facility]                                OPERATIVE REPORT    PATIENT NAME: Joelle Edwards                    :        1975  MED REC NO:   [id number]                            ROOM:       5415  ACCOUNT NO:   [de-identified]                           ADMIT DATE: 2022  PROVIDER:     Winston Oliva MD    DATE OF PROCEDURE:  2022    PREOPERATIVE DIAGNOSIS:  Right hand mutilating injury with open  fractures. POSTOPERATIVE DIAGNOSES:  1. Right hand grade 3A open fractures involving fourth and fifth  metacarpals. 2.  Right dorsal hand skin avulsion measuring 10 cm from radial to  ulnar. 3.  First ray disruption of intermetacarpal ligament and complex  laceration. 4.  Proximal phalanx fracture, little finger. 5.  Laceration, ring finger with an injury of radial digital artery and  intact, but contused radial digital nerve. 6.  Complex laceration, 30 cm. SURGERIES PERFORMED:  1. Excisional debridement of open fractures of fourth and fifth  metacarpals. 2.  Open reduction internal fixation of fourth metacarpal using 2.0  Synthes precontoured locking plates and screws. 3.  Open reduction and intramedullary fixation of fifth metacarpal using  a Synthes 4.0 headless compression screw. 4.  Closed reduction and intramedullary screw fixation of little finger  proximal phalanx fracture with a Synthes 2.5 mm headless compression  screw. 5.  Repair of intermetacarpal ligament, index and middle finger, with  open reduction and pinning. 6.  Complex closure total length approximately 35 cm including a large  dorsal skin avulsion tissue and lacerations of the second webspace of  index and middle finger as well as laceration of the palm and including  ring finger _____. SURGEON:  Winston Oliva MD  EBL: min  Assistant: Dr Angel Calzada, orthopedic resident  SPECIMENS:  None.     COMPLICATIONS: artery,  but the digital nerve was intact, but significantly contused. The  adjacent flexor tendons appeared to be intact. This confirmed  excisional debridement including skin, subcutaneous tissues and deep  fascia was undertaken with a 15-blade scalpel. Several liters of saline  were irrigated through the wound. Gross foreign debris was removed. The fracture of the fourth metacarpal was debrided. The fifth  metacarpal was adjacent to this within the flap and was able to be  copiously irrigated out. Status post excisional debridement, copious irrigation, attention was  directed towards the fourth metacarpal.    Fourth metacarpal was amenable to direct reduction which was held with  K-wire. A dorsal 2.0 Synthes variable angle locking plate was then  selected, precontoured, and bent to meet the fracture. This was then  secured in place with nonlocking screw followed by locking screws  proximally and additional non-locking screws distally. Screws were  meticulously drilled, measured, and inserted with good capture achieved. X-ray, AP and lateral obliques confirmed excellent alignment. Attention was directed towards the fifth metacarpal.  Fifth metacarpal  appeared to have multiple previous malunions present. There was a  flexion deformity, gross angulation, complete diastasis, and disruption. A small lateral incision over the hypothenar region was undertaken to  allow fracture debridement and direct reduction as there was difficulty  achieving this through the dorsal skin avulsion. This was then held  with forceps and a guidewire was placed down the metacarpal and the best  axis of alignment through the metacarpal head maintaining reduction  proximally _____ into the proximal segments. This was estimated at  about 52 mm, overdrilled and screw inserted with excellent capture  achieved.   Excellent alignment was restored to the metacarpal correcting  the previous malunion and stabilizing the fracture. Given these  parameters and excellent alignment relatively adjacent ring finger, this  was deemed acceptable. With this confirmed, attention was directed  towards the little finger proximal phalanx fracture. This was a  transverse fracture. A guide pin for a 25 headless compression screw  was inserted and confirmed excellent alignment, estimated at 20 mm in  length, overdrilled inserted providing excellent stability to his  fracture. A second screw was not deemed necessary. Attention was directed towards the index, middle finger diastasis. There was complete disruption in the metacarpal ligament. There was  gross disruption and loss of the dorsal intrinsics. This muscle was  debrided. The inner metacarpal ligament was then repaired using 2-0  FiberWire with good approximation. The wounds were again thoroughly and  copiously irrigated out. The tourniquet was deflated. Meticulous  hemostasis achieved. Complex wound closure dorsally repairing the skin  flap was undertaken followed by repair of the intermetacarpal webspace  between the index and middle fingers. To provide additional stability  to the index and middle finger interval, two K-wires after a skin  closure dorsally was achieved, was placed across the fracture,  maintaining the reduction. Palmarly, there was complex wound to the  ring and palm which was repaired with multiple nylon sutures. Total  length of complex closure was approximately 35 cm. The hand was pink  and viable and warm at the conclusion of the case. Compartments were  soft and compressible. A bulky sterile dressing and a dorsal splint in  the intrinsic plus position was applied. The patient remained stable  and was admitted for postoperative antibiotics and pain control.         Pilar Ramsey MD    D: 08/01/2022 22:21:24       T: 08/01/2022 22:25:40     [email or web address]  Job#: [id number]     Doc#: [id number]    CC:

## 2022-08-02 NOTE — PROGRESS NOTES
Department of Orthopedic Surgery  Progress Note    Patient seen and examined. Resting comfortably at bedside. Pain controlled. No new complaints. Questions answered at bedside with son. Doing well POD #1      VITALS:  BP (!) 128/98   Pulse 94   Temp 97.5 °F (36.4 °C) (Temporal)   Resp 16   Ht 5' 11\" (1.803 m)   Wt 265 lb (120.2 kg)   SpO2 95%   BMI 36.96 kg/m²     General: alert and oriented to person, place and time, well-developed and well-nourished, in no acute distress    MUSCULOSKELETAL:   right upper extremity:  Dressing C/D/I, mild saturation about the thumb  Compartments soft and compressible   Cap refill < 2 seconds right thumb   Patient has sensation to all 5 fingertips   He reported that sensation to fingertips did feel slightly altered when palpated. As though they were asleep. +Flexion/extension of small, ring, and long    CBC:   Lab Results   Component Value Date/Time    WBC 18.1 08/02/2022 04:44 AM    HGB 14.1 08/02/2022 04:44 AM    HCT 42.2 08/02/2022 04:44 AM     08/02/2022 04:44 AM     PT/INR:  No results found for: PROTIME, INR      ASSESSMENT  S/P on 8/2/22   1. Excisional debridement of open fractures of fourth and fifth metacarpals. 2.  Open reduction internal fixation of fourth metacarpal   3. Open reduction and intramedullary fixation of fifth metacarpal   4. Closed reduction and intramedullary screw fixation of little finger proximal phalanx fracture   5. Repair of intermetacarpal ligament, index and middle finger, with open reduction and pinning. PLAN      Continue physical therapy and protocol: NWB - Right UE  24 hour abx coverage per open fracture protocol  Deep venous thrombosis prophylaxis - early mobilization  PT/OT  Pain Control: IV and PO  Monitor H&H  Possible DC today after antibiosis.   D/C Plan:  Home       Electronically signed by Bebe Tejeda DO 8/2/2022 at 6:27 AM     I have seen and evaluated the patient and agree with the above assessment and plan on today's visit. I have performed the key components of the history and physical examination with significant findings of postop severe mutilating right hand injury. Severity of injury and surgery explained. Continue splint and elevation. Follow up 5-7 days in office for wound check and OT. I concur with the findings and plan as documented.     Carole Coburn MD  8/2/2022

## 2022-08-02 NOTE — DISCHARGE SUMMARY
Physician Discharge Summary    Patient ID:  Jasmina Nicolas  [id number]  21 y.o.  1975    Admit date: 8/1/2022    Discharge date and time: 8/2/2022 11:27 AM     Admitting Physician: Carole Coburn MD     Discharge Physician: Carole Coburn MD    Admission Diagnoses: Right hand fracture, open, initial encounter [S62.91XB]  Degloving injury of right hand, initial encounter [S61.401A]  Open fracture of phalanx of digit of hand, initial encounter [S62.609B]    Discharge Diagnoses: s/p 1. Excisional debridement of open fractures of fourth and fifth metacarpals. 2.  Open reduction internal fixation of fourth metacarpal using 2.0  Synthes precontoured locking plates and screws. 3.  Open reduction and intramedullary fixation of fifth metacarpal using a Synthes 4.0 headless compression screw. 4.  Closed reduction and intramedullary screw fixation of little finger  proximal phalanx fracture with a Synthes 2.5 mm headless compression screw. 5.  Repair of intermetacarpal ligament, index and middle finger, with  open reduction and pinning. 6.  Complex closure total length approximately 35 cm including a large dorsal skin avulsion tissue and lacerations of the second webspace of index and middle finger as well as laceration of the palm and including ring finger. Admission Condition: fair    Discharged Condition: good    Hospital Course: The patient was admitted on 8/1/2022. The patient underwent an uneventful course of right hand I and D, with multiple ligamentous and bony fixation by Carole Coburn MD on 8/1/22. The patient was subsequently taken to the PACU and the floor in stable condition. The patient was started on pain control, antibiotics, and physical therapy as indicated. Blood counts were followed as needed. Discharge planning was performed and tailored in regards to patient progress, therapy progress, home needs, and support.  Once the patient had made appropriate gains, they were able to be discharged to home on oral antibiosis. Treatments: s/p right 1. Excisional debridement of open fractures of fourth and fifth metacarpals. 2.  Open reduction internal fixation of fourth metacarpal using 2.0  Synthes precontoured locking plates and screws. 3.  Open reduction and intramedullary fixation of fifth metacarpal using a Synthes 4.0 headless compression screw. 4.  Closed reduction and intramedullary screw fixation of little finger  proximal phalanx fracture with a Synthes 2.5 mm headless compression screw. 5.  Repair of intermetacarpal ligament, index and middle finger, with  open reduction and pinning. 6.  Complex closure total length approximately 35 cm including a large dorsal skin avulsion tissue and lacerations of the second webspace of index and middle finger as well as laceration of the palm and including ring finger. Disposition: The patient was provided instructions to continue antibiotics, pain medication, DVT prophylaxis, Dressing changes as applicable. The patient was instructed on restrictions and activities. The patient was to follow up with Nasrin Liang MD, and to call the office for an appointment. Follow up in 7 days. Medication List        START taking these medications      cephALEXin 500 MG capsule  Commonly known as: KEFLEX  Take 1 capsule by mouth in the morning and 1 capsule at noon and 1 capsule in the evening and 1 capsule before bedtime. Do all this for 10 days. oxyCODONE-acetaminophen 5-325 MG per tablet  Commonly known as: Percocet  Take 1 tablet by mouth every 6 hours as needed for Pain for up to 7 days. Intended supply: 7 days.  Take lowest dose possible to manage pain            ASK your doctor about these medications      lisinopril 20 MG tablet  Commonly known as: PRINIVIL;ZESTRIL     pantoprazole 40 MG tablet  Commonly known as: PROTONIX               Where to Get Your Medications        These medications were sent to [facility] [address], [address]  [address] [facility]      Phone: [phone]   cephALEXin 500 MG capsule       You can get these medications from any pharmacy    Bring a paper prescription for each of these medications  oxyCODONE-acetaminophen 5-325 MG per tablet          Signed:  Ry Bower DO  8/2/2022  4:34 PM

## 2022-08-10 ENCOUNTER — TELEPHONE (OUTPATIENT)
Dept: ORTHOPEDIC SURGERY | Age: 47
End: 2022-08-10

## 2022-08-10 DIAGNOSIS — S62.306B OPEN DISPLACED FRACTURE OF FIFTH METACARPAL BONE OF RIGHT HAND, UNSPECIFIED PORTION OF METACARPAL, INITIAL ENCOUNTER: Primary | ICD-10-CM

## 2022-08-10 DIAGNOSIS — S62.314B: ICD-10-CM

## 2022-08-11 ENCOUNTER — OFFICE VISIT (OUTPATIENT)
Dept: ORTHOPEDIC SURGERY | Age: 47
End: 2022-08-11

## 2022-08-11 VITALS — HEIGHT: 71 IN | BODY MASS INDEX: 36.4 KG/M2 | WEIGHT: 260 LBS

## 2022-08-11 DIAGNOSIS — S62.314B: ICD-10-CM

## 2022-08-11 DIAGNOSIS — S62.306B OPEN DISPLACED FRACTURE OF FIFTH METACARPAL BONE OF RIGHT HAND, UNSPECIFIED PORTION OF METACARPAL, INITIAL ENCOUNTER: Primary | ICD-10-CM

## 2022-08-11 PROCEDURE — 99024 POSTOP FOLLOW-UP VISIT: CPT | Performed by: ORTHOPAEDIC SURGERY

## 2022-08-11 NOTE — PROGRESS NOTES
A removable fiberglass dorsal splint fabricated and applied to right wrist/hand/fingers.     Arm sling fitted
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
## 2022-08-12 DIAGNOSIS — S62.314B: ICD-10-CM

## 2022-08-12 DIAGNOSIS — S62.306B OPEN DISPLACED FRACTURE OF FIFTH METACARPAL BONE OF RIGHT HAND, UNSPECIFIED PORTION OF METACARPAL, INITIAL ENCOUNTER: Primary | ICD-10-CM

## 2022-08-16 DIAGNOSIS — S62.609B OPEN FRACTURE OF PHALANX OF DIGIT OF HAND, INITIAL ENCOUNTER: ICD-10-CM

## 2022-08-16 RX ORDER — OXYCODONE HYDROCHLORIDE AND ACETAMINOPHEN 5; 325 MG/1; MG/1
1 TABLET ORAL EVERY 6 HOURS PRN
Qty: 28 TABLET | Refills: 0 | Status: SHIPPED | OUTPATIENT
Start: 2022-08-16 | End: 2022-08-23

## 2022-08-16 NOTE — TELEPHONE ENCOUNTER
Patient is requesting a medication refill, OARRS complete. Patient is 2 weeks out from surgery of Excisional debridement of open fractures of fourth and fifth metacarpals. 2.  Open reduction internal fixation of fourth metacarpal 3. Open reduction and intramedullary fixation of fifth metacarpal 4. Closed reduction and intramedullary screw fixation of little finger 5. Repair of intermetacarpal ligament, index and middle finger, with open reduction and pinning. Patient was last seen on 8/11 and patients next appointment is 9/12. Patient was last given percocet 5-325mg q6hr PRN on 8/1.

## 2022-08-22 ENCOUNTER — HOSPITAL ENCOUNTER (OUTPATIENT)
Dept: OCCUPATIONAL THERAPY | Age: 47
Setting detail: THERAPIES SERIES
Discharge: HOME | End: 2022-08-22
Payer: COMMERCIAL

## 2022-08-22 PROCEDURE — 97167 OT EVAL HIGH COMPLEX 60 MIN: CPT

## 2022-08-22 PROCEDURE — 97530 THERAPEUTIC ACTIVITIES: CPT
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
## 2022-08-24 ENCOUNTER — TELEPHONE (OUTPATIENT)
Dept: ORTHOPEDIC SURGERY | Age: 47
End: 2022-08-24

## 2022-08-24 ENCOUNTER — HOSPITAL ENCOUNTER (OUTPATIENT)
Dept: OCCUPATIONAL THERAPY | Age: 47
Setting detail: THERAPIES SERIES
Discharge: HOME | End: 2022-08-24
Payer: COMMERCIAL

## 2022-08-24 PROCEDURE — 97140 MANUAL THERAPY 1/> REGIONS: CPT

## 2022-08-24 PROCEDURE — 97530 THERAPEUTIC ACTIVITIES: CPT

## 2022-08-24 NOTE — PROGRESS NOTES
[facility] OCCUPATIONAL THERAPY     Occupational Therapy: Daily Note   Patient: Nic Redmond (61 y.o. male)   Date:   Plan of Care Certification Period:      :  1975  MRN: [id number]  CSN: [id number]   Insurance: Payor: Marthenia Apgar / Plan: Marthenia Apgar / Product Type: *No Product type* /   Insurance ID: [id number] - (Medicaid Managed) Secondary Insurance (if applicable):    Referring Physician: Shad Dockery MD     PCP: Indu Jiang MD Visits to Date: Total # of Visits to Date: 2   Progress note:Progress Note Counter: 2  Visits Approved: 30    No Show:    Cancelled Appts:      Medical Diagnosis: Unspecified fracture of fifth metacarpal bone, right hand, initial encounter for open fracture [S62.306B]  Displaced fracture of base of fourth metacarpal bone, right hand, initial encounter for open fracture [S62.314B] Displaced fx. D4 & D5 RUE        Therapy Time    Time in 0725   Time out 0846   Total treatment minutes 81   Total time code minutes           OT Manual therapy 52 minutes for 3 unit(s), CPT 25263  OT Therapeutic activities 29 minutes for 2 unit(s), CPT 56317       SUBJECTIVE EXAMINATION     Patient's date of birth confirmed: Yes     Pain Level:   4/10    Patient Comments:  \"I only have pain around the pins and top of my hand. \"    Learning/Language barrier: no     HEP/Strategies/Orthosis Compliance: Patient verbally confirmed compliant with edema management and splint protocol. Restrictions:   No use of R hand     OBJECTIVE EXAMINATION   Measured L  for future comparison to R. Average of  Three tries   LEFT  CURRENT     Left     Norm       [place] lb.  101 95   PALMAR  Pinch  Lb. 23 18   LATERAL  Pinch  Lb. 26 19        TREATMENT     Focus of treatment was on the following:   Cleaning/removing dry skin/tissue R hand, reducing edema  and improving A/PROM      MFR/Manual:   Pt treated seated on chair applied warm soapy cloth to B sides of hand avoiding open areas, performed light rubbing of all healthy areas of fingers, palm, dorsal hand and wrist to remove dried, peeling skin and blood residue. Removed 6 stitches that remained in hand. Soft tissue mobilization all digits and hand followed by light to mod pressure retrograde massage to decrease edema. Milking technique of forearm provided to also improve drainage and mobility of forearm to reduce edema. RUE elevated during treatment. Pt. performed AAROM R wrist flex/ext w/ gravity assisted stretch at end range x 10 reps. AAROM each digit for flex/extension to improve ROM tolerated without pain. 5 reps completed each digit. Pt. completed wrist flex/ext AROM w/ light fisted position of hand x 10. Followed w/ hand pumping with hand over head. Patient Education/HEP:   Continue recommended HEP/activities. ASSESSMENT       Assessment: Pt tolerated treatment well. Pt reported no pain after OT treatment. Reduced edema noted during session w/ improved ability to move fingers into flex/extension. Post Treatment Pain: Pt denies pain    Patient's Activity Tolerance:    good            GOALS         Long Term Goals  Time Frame for Long term goals : 16-24 visit(6-8 weeks)  Long Term Goal 1: Patient will report pain 2 or less during functional activities  Long Term Goal 2: Patient  will be indep with all recommended HEP's, adaptive strategies, and adaptive techniques. Long Term Goal 3: Patient will report decreased frequency of numbness/tingling in RUE. Long Term Goal 4: Patient will increase AROM of RUE wrist and hand to [facility] to increase performance with I/ADL's  Long Term Goal 5: Patient  will increase RUE strength from current to [facility] to increase performance with I/ADL's  Long Term Goal 6: Patient  will increase R  strength from current to 80% norm for age and gender to increase performance with I/ADL's.   Long Term Goal 7: Patient  will increase R pinch strength from current to 80% norm for age and gender  to increase performance with I/ADLs  Long Term Goal 8: Patient  will increase dexterity in R hand as observed by 9 hole peg test by performing [facility] for age and gender to increase performance with I/ADL's. Long Term Goal 9: Patient  will decrease edema in RUE to increase ROM and performance with I/ADL's. Long Term Goal 10: Pt. will utilize positioning and adaptive techniques to perform light weight work related activities indep. with RUE.          TREATMENT PLAN   Continue POC           Electronically signed by YOBANI Rhoades/L  on 8/24/2022 at 9:27 AM

## 2022-08-24 NOTE — TELEPHONE ENCOUNTER
Pt called in request a letter for work stating what type of injury and how long it will take for a full recovery.
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
## 2022-08-25 ENCOUNTER — HOSPITAL ENCOUNTER (OUTPATIENT)
Dept: OCCUPATIONAL THERAPY | Age: 47
Setting detail: THERAPIES SERIES
Discharge: HOME | End: 2022-08-25
Payer: COMMERCIAL

## 2022-08-25 PROCEDURE — 97140 MANUAL THERAPY 1/> REGIONS: CPT

## 2022-08-25 PROCEDURE — 97530 THERAPEUTIC ACTIVITIES: CPT

## 2022-08-25 NOTE — PROGRESS NOTES
[facility]     Occupational Therapy: Daily Note   Patient: Darron Tobias (34 y.o. male)   Date:   Plan of Care Certification Period:      :  1975  MRN: [id number]  CSN: [id number]   Insurance: Payor: [address] [place] [address]  [address] / Plan: [address] [place] [address] [address] / Product Type: *No Product type* /   Insurance ID: [id number] - (Medicaid Managed) Secondary Insurance (if applicable):    Referring Physician: Holly Guajardo MD     PCP: Byron Marx MD Visits to Date: Total # of Visits to Date: 3   Progress note:Progress Note Counter: 3  Visits Approved: 30    No Show:    Cancelled Appts:      Medical Diagnosis: Unspecified fracture of fifth metacarpal bone, right hand, initial encounter for open fracture [S62.306B]  Displaced fracture of base of fourth metacarpal bone, right hand, initial encounter for open fracture [S62.314B] Displaced fx. D4 & D5 RUE        Therapy Time    Time in 905   Time out 0950   Total treatment minutes 45   Total time code minutes  45 Minutes        OT Manual therapy 25 minutes for 2 unit(s), CPT 68066  OT Therapeutic activities 20 minutes for 1 unit(s), CPT 23313       SUBJECTIVE EXAMINATION     Patient's date of birth confirmed: Yes     Pain Level:   Pt denies pain    Patient Comments:  \"I do get occasional shooting pains, but I mostly can just tell when my hand is swelling up. \"    Learning/Language barrier: yes     HEP/Strategies/Orthosis Compliance: Pt. reports he is raising his arm overhead and wiggling his fingers frequently and massaging w/ covering on.  Pt. not consistent w/ massage HEP and elevation of UE. Reviewed importance of frequent retrograde massage, cleaning of pins and AROM of available range of all digits. As well as changing any dressing gauze. On visual observation, Pt. into department with significant edema palm of hand and digits compared to yesterday.   Small amount of bleeding noted dorsal hand wound near base if index digit. Wound appears to be approximating well with improved appearance of skin and less drainage of clear fluid over open areas. OBJECTIVE EXAMINATION     TREATMENT     Focus of treatment was on the following:   Reducing edema, increasing A/PROM of all digits. MFR/Manual:   Pt treated seated on chair with RUE elevated on wedge and pillows supporting hand at face level to assist w/ decr edema. Utilized warm wash cloth to continue cleaning entire hand and softening surface tissue. Performed soft tissue mobilization of wrist and forearm. Mod pressure for CW/CCW circular motions across each digit, palm and dorsum of hand followed by milking/stroking massage to reduce edema tolerated. Again, Instructed pt. in positioning of hand at home and proper massage techniques to reduce edema. Pt. demo massage techniques back to therapist with fairly good follow through. Educated pt. on importance of performing HEP 5-6 times a day or more to prevent edema. Also instructed pt. to sleep with UE elevated as much as tolerated until edema under control. Pt. participated in activities for ROM of hand:  Isolated digit flexion/extension AROM/AAROM, flexing and extending w/ 3-5 sec hold at end range x 5 reps. With wound covered, placed foam ball on table,pt. rolled soft foam ball in palm for self tissue mobilization HEP with good follow through. Patient Education/HEP:    edema management w/ elevation and retrograde massage 5-6 x  a day, mobilization with soft foam ball 5-6 x day. ASSESSMENT       Assessment: Pt tolerated treatment well. Pt. required frequent cues to perform HEP correctly. Pt. did understand importance of reducing edema for progress with RUE. Wound is healing well with incr. motion of digits and decr edema at end of session.      Post Treatment Pain: Pt denies pain    Patient's Activity Tolerance:    good            GOALS         Long Term Goals  Time Frame for Long term goals : 16-24 visit(6-8 weeks)  Long Term Goal 1: Patient will report pain 2 or less during functional activities  Long Term Goal 2: Patient  will be indep with all recommended HEP's, adaptive strategies, and adaptive techniques. Long Term Goal 3: Patient will report decreased frequency of numbness/tingling in RUE. Long Term Goal 4: Patient will increase AROM of RUE wrist and hand to [facility] [place] to increase performance with I/ADL's  Long Term Goal 5: Patient  will increase RUE strength from current to [place] [place] to increase performance with I/ADL's  Long Term Goal 6: Patient  will increase R  strength from current to 80% norm for age and gender to increase performance with I/ADL's. Long Term Goal 7: Patient  will increase R pinch strength from current to 80% norm for age and gender  to increase performance with I/ADLs  Long Term Goal 8: Patient  will increase dexterity in R hand as observed by 9 hole peg test by performing [place] [place] for age and gender to increase performance with I/ADL's. Long Term Goal 9: Patient  will decrease edema in RUE to increase ROM and performance with I/ADL's. Long Term Goal 10: Pt. will utilize positioning and adaptive techniques to perform light weight work related activities indep. with RUE.          TREATMENT PLAN   Continue POC           Electronically signed by ANIL Mcdonald  on 8/25/2022 at 11:55 AM

## 2022-08-30 ENCOUNTER — HOSPITAL ENCOUNTER (OUTPATIENT)
Dept: OCCUPATIONAL THERAPY | Age: 47
Setting detail: THERAPIES SERIES
Discharge: HOME | End: 2022-08-30
Payer: COMMERCIAL

## 2022-08-30 PROCEDURE — 97140 MANUAL THERAPY 1/> REGIONS: CPT

## 2022-08-30 PROCEDURE — 97530 THERAPEUTIC ACTIVITIES: CPT

## 2022-08-30 NOTE — PROGRESS NOTES
[facility]     Occupational Therapy: Daily Note   Patient: Todd Samuels (32 y.o. male)   Date: 7861  Plan of Care Certification Period:      :  1975  MRN: [id number]  CSN: [id number]   Insurance: Payor: [address] [place] [address] [address] / Plan: [address] [place] [address] [address] / Product Type: *No Product type* /   Insurance ID: [id number] - (Medicaid Managed) Secondary Insurance (if applicable):    Referring Physician: Mauricio Garcia MD     PCP: Colletta Harries, MD Visits to Date: Total # of Visits to Date: 4   Progress note:Progress Note Counter: 4  Visits Approved: 28    No Show:    Cancelled Appts:      Medical Diagnosis: Unspecified fracture of fifth metacarpal bone, right hand, initial encounter for open fracture [S62.306B]  Displaced fracture of base of fourth metacarpal bone, right hand, initial encounter for open fracture [S62.314B] Displaced fx. D4 & D5 RUE        Therapy Time    Time in 0800   Time out 0858   Total treatment minutes 58   Total time code minutes  58 Minutes        OT Manual therapy 30 minutes for 2 unit(s), CPT 08853  OT Therapeutic activities 28 minutes for 2 unit(s), CPT 24973       SUBJECTIVE EXAMINATION     Patient's date of birth confirmed: Yes     Pain Level:   Pt denies pain    Patient Comments: \"My swelling is worse in the mornings, but my hand is not hard. \"    Learning/Language barrier: no     HEP/Strategies/Orthosis Compliance: Patient verbally stated non compliant with orthosis schedule. Patient demo understanding. Restrictions:   No lifting with R hand       OBJECTIVE EXAMINATION     TREATMENT     Focus of treatment was on the following:   reducing edema and incr A/PROM R hand      MFR/Manual:   Pt treated seated on chair with hand elevated on 2 pillows. Utilized moist heated cloth for retrograde massage to all digits, dorsal and palm of hand.   Followed w/ circular soft tissue massage using lotion to entire hand, digits, wrist and forearm to reduce edema. PROM and gentle joint mobilization completed each joint, each digit. Pt. participated in digit blocking AAROM each digit, each joint x 5 reps each. Fisting motions within his limitations completed w/ active wrist flex/extension. Light finger ab/adduction AROM performed 5 reps only. Pt. was able to use RUE to grasp a 2\" and 2.5\" diameter cone, touching all tips to outside of cones. Thumb adduction, opposition and circumduction AROM performed with stretch and hold at end range x 5 reps each. Patient Education/HEP:   Continue recommended HEP/activities. ASSESSMENT       Assessment: Pt reported increased  pain after OT treatment. Pt making good  progress towards goals. Edema is softening and pt. better able to move digits. Light dressing removed by pt., noted tips of pins were slightly turned and tips now directed toward thenar eminence. Possibly due to pt. pulling light ace wrap to firmly over hand. Instructed pt. to contact physician. AROM significantly limited in MP joints due to edema. Pt's wounds are approximating well and no purulent drainage noted. Pt. continues to have a split open area between index and middle digit and middle and ring digit. Instructed pt. to place gauze between digits to allow air to flow between digits for healing. Pt. was able to flex fingers with reports of pressure pain in palm, 4/10. Post Treatment Pain: 4/10    Patient's Activity Tolerance:    good. GOALS         Long Term Goals  Time Frame for Long term goals : 16-24 visit(6-8 weeks)  Long Term Goal 1: Patient will report pain 2 or less during functional activities  Long Term Goal 2: Patient  will be indep with all recommended HEP's, adaptive strategies, and adaptive techniques. Long Term Goal 3: Patient will report decreased frequency of numbness/tingling in RUE.   Long Term Goal 4: Patient will increase AROM of RUE wrist and hand to [facility] to increase performance with I/ADL's  Long Term Goal 5: Patient  will increase RUE strength from current to [place] to increase performance with I/ADL's  Long Term Goal 6: Patient  will increase R  strength from current to 80% norm for age and gender to increase performance with I/ADL's. Long Term Goal 7: Patient  will increase R pinch strength from current to 80% norm for age and gender  to increase performance with I/ADLs  Long Term Goal 8: Patient  will increase dexterity in R hand as observed by 9 hole peg test by performing [facility] for age and gender to increase performance with I/ADL's. Long Term Goal 9: Patient  will decrease edema in RUE to increase ROM and performance with I/ADL's. Long Term Goal 10: Pt. will utilize positioning and adaptive techniques to perform light weight work related activities indep. with RUE. TREATMENT PLAN   Continue POC and measure progress next visit.            Electronically signed by YOBANI Bourne/L  on 8/30/2022 at 9:17 AM

## 2022-08-31 ENCOUNTER — HOSPITAL ENCOUNTER (OUTPATIENT)
Dept: OCCUPATIONAL THERAPY | Age: 47
Setting detail: THERAPIES SERIES
Discharge: HOME | End: 2022-08-31
Payer: COMMERCIAL

## 2022-08-31 PROCEDURE — 97110 THERAPEUTIC EXERCISES: CPT

## 2022-08-31 PROCEDURE — 97530 THERAPEUTIC ACTIVITIES: CPT

## 2022-08-31 PROCEDURE — 97140 MANUAL THERAPY 1/> REGIONS: CPT
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
## 2022-09-01 ENCOUNTER — HOSPITAL ENCOUNTER (OUTPATIENT)
Dept: OCCUPATIONAL THERAPY | Age: 47
Setting detail: THERAPIES SERIES
Discharge: HOME | End: 2022-09-01
Payer: COMMERCIAL

## 2022-09-01 PROCEDURE — 97110 THERAPEUTIC EXERCISES: CPT

## 2022-09-01 PROCEDURE — 97530 THERAPEUTIC ACTIVITIES: CPT

## 2022-09-01 PROCEDURE — 97140 MANUAL THERAPY 1/> REGIONS: CPT

## 2022-09-01 NOTE — PROGRESS NOTES
[facility] OCCUPATIONAL THERAPY     Occupational Therapy: Daily Note   Patient: Leopoldo De Dios (28 y.o. male)   Date:   Plan of Care Certification Period:      :  1975  MRN: [id number]  CSN: [id number]   Insurance: Payor: [address] [place] [address]  [address] / Plan: [address] [place] [address] [address] / Product Type: *No Product type* /   Insurance ID: [id number] - (Medicaid Managed) Secondary Insurance (if applicable):    Referring Physician: Elise Warren MD     PCP: Adan Zaidi MD Visits to Date: Total # of Visits to Date: 6   Progress note:Progress Note Counter: 6  Visits Approved: 28    No Show:    Cancelled Appts:      Medical Diagnosis: Unspecified fracture of fifth metacarpal bone, right hand, initial encounter for open fracture [S62.306B]  Displaced fracture of base of fourth metacarpal bone, right hand, initial encounter for open fracture [S62.314B] Displaced fx. D4 & D5 RUE        Therapy Time    Time in 0800   Time out 0900   Total treatment minutes 60   Total time code minutes  60 Minutes        OT Manual therapy 30 minutes for 1 unit(s), CPT 92817  OT Therapeutic activities 20 minutes for 1 unit(s), CPT 04180  OT Therapeutic exercises 15 minutes for 1 unit(s), CPT 95891       SUBJECTIVE EXAMINATION     Patient's date of birth confirmed: Yes     Pain Level:   Pt denies pain    Patient Comments:   \"I had some pain last night, but I was busy and it swelled up. I also can't hold my deoderant to put it under my Left arm. \"    Learning/Language barrier: no       HEP/Strategies/Orthosis Compliance: Patient verbally confirmed compliant with HEP's Patient demo understanding. OBJECTIVE EXAMINATION     TREATMENT     Focus of treatment was on the following:   Reducing edema, increasing A/PROM of R hand for functional use. MFR/Manual:   Pt treated seated on chair with RUE elevated on wedge. Completed debridement of dead, peeling skin palm and dorsal hand.   Followed w/ scar and soft tissue mobilization with deep pressure all aspects of digits, hand and wrist.  Soft tissue mobilization of forearm tolerated well with improved skin mobility noted. Joint mobilization of MP joints of each digit performed, as well as PROM each digit. Pt. participated in AROM of entire RUE. With palm and forearm on table surface, pt. was able to extend each digit to lift off table, 3 reps each. Pt. reached in various directions fully extending UE to encircle 2.5\" diameter cones touching all digit tips to surface and stack on top of each other, 8 cones x 2. Pt. also practiced picking up various diameter pill bottles, 1.5-2\" diameter and place on table using sup/pronation of forearm. Pt. was able to  deodorant container w/ R hand and simulate applying deodorant under L arm x 2 trials. Cues needed to be careful of pins in radial side of hand. Patient Education/HEP:    Scar management with Topigel scar sheeting for scar ulnar side of hand. Pt. provided w/ stockinette to maintain sheeting in place. Pt. demo good understanding of use scar sheeting       ASSESSMENT       Assessment: Pt tolerated treatment well. Pt reported no pain after OT treatment. Pt. progressing well as able to lightly grasp lightweight objects and demo the ability to manage deodorant container. Skin integrity significantly improved with decreased density of edema noted. Post Treatment Pain: Pt denies pain    Patient's Activity Tolerance: Good                   GOALS         Long Term Goals  Time Frame for Long term goals : 16-24 visit(6-8 weeks)  Long Term Goal 1: Patient will report pain 2 or less during functional activities  Long Term Goal 2: Patient  will be indep with all recommended HEP's, adaptive strategies, and adaptive techniques. Long Term Goal 3: Patient will report decreased frequency of numbness/tingling in RUE.   Long Term Goal 4: Patient will increase AROM of RUE wrist and hand to [facility] to increase performance with I/ADL's  Long Term Goal 5: Patient  will increase RUE strength from current to [place] to increase performance with I/ADL's  Long Term Goal 6: Patient  will increase R  strength from current to 80% norm for age and gender to increase performance with I/ADL's. Long Term Goal 7: Patient  will increase R pinch strength from current to 80% norm for age and gender  to increase performance with I/ADLs  Long Term Goal 8: Patient  will increase dexterity in R hand as observed by 9 hole peg test by performing [place] for age and gender to increase performance with I/ADL's. Long Term Goal 9: Patient  will decrease edema in RUE to increase ROM and performance with I/ADL's. Long Term Goal 10: Pt. will utilize positioning and adaptive techniques to perform light weight work related activities indep. with RUE. TREATMENT PLAN   See pt. 2-3 x week to improve tendon excursion all digits and improve AROM to obtain full grasp RUE.            Electronically signed by YOBANI Burgess/L  on 9/1/2022 at 9:28 AM

## 2022-09-06 ENCOUNTER — HOSPITAL ENCOUNTER (OUTPATIENT)
Dept: OCCUPATIONAL THERAPY | Age: 47
Setting detail: THERAPIES SERIES
Discharge: HOME | End: 2022-09-06
Payer: COMMERCIAL

## 2022-09-06 PROCEDURE — 97140 MANUAL THERAPY 1/> REGIONS: CPT

## 2022-09-06 PROCEDURE — 97530 THERAPEUTIC ACTIVITIES: CPT

## 2022-09-06 NOTE — PROGRESS NOTES
[facility] OCCUPATIONAL THERAPY     Occupational Therapy: Daily Note   Patient: Jasmina Nicolas (6 y.o. male)   Date:   Plan of Care Certification Period:      :  1975  MRN: [id number]  CSN: [id number]   Insurance: Payor: [address] [place] [address] [address] / Plan: [address] [place] [address] [address] / Product Type: *No Product type* /   Insurance ID: [id number] - (Medicaid Managed) Secondary Insurance (if applicable):    Referring Physician: Adali Bullock MD     PCP: Vear Goltz, MD Visits to Date: Total # of Visits to Date: 7   Progress note:Progress Note Counter: 7  Visits Approved: 28    No Show:    Cancelled Appts:      Medical Diagnosis: Unspecified fracture of fifth metacarpal bone, right hand, initial encounter for open fracture [S62.306B]  Displaced fracture of base of fourth metacarpal bone, right hand, initial encounter for open fracture [S62.314B] Displaced fx. D4 & D5 RUE        Therapy Time    Time in 0733   Time out 0845   Total treatment minutes 72   Total time code minutes           OT Manual therapy 40 minutes for 3 unit(s), CPT 74811  OT Therapeutic activities 32 minutes for 2 unit(s), CPT 98721       SUBJECTIVE EXAMINATION     Patient's date of birth confirmed: Yes     Pain Level:   Pt denies pain    Patient Comments: \"My hand swelling keeps fluctuating, especially with the humidity this weekend. \"    Learning/Language barrier: no     HEP/Strategies/Orthosis Compliance: Patient verbally confirmed compliant with HEP's    Restrictions: No lifting or heavy use of R hand        OBJECTIVE EXAMINATION     TREATMENT     Focus of treatment was on the following:   reducing edema, increasing A/PROM of all digits R hand, reducing soft tissue restrictions and improving wound healing      MFR/Manual:   Pt treated seated on chair with R hand elevated above heart on wedge cushion. Warm soapy cloth utilized to soften and clean edges of wounds, hand, palm and between digits.   Followed with removing dead tissue. Retrograde massage and soft tissue mobilization of all digits, hand, palm and wrist completed with lotion and with massage roller ball to soften tissue. Utilized sterile scissors to trim raised peeling edges of scab for improved healing. Pt. performed digit blocking AAROM each joint of each digit R hand with therapist assist, 3 reps each joint. PROM of MP joints tolerated. Pt. performed AROM digit flexion/extension with palm pressed onto rolled towel to touch towel with fingertips and then extend to full potential.  2 x 10 reps. Isolated digit extension each digit x 5 reps. Pt. was able to grasp and release 3\" diameter ball x 10 reps. However, pt. was unable to  the ball from the table. Tendon gliding ex of making hook fist, flat fist and tabletop fist attempted, 3 trials each. Light dressing applied over dorsal wound and pins and wrapped lightly with ace wrap. Pt. instructed to perform fisting motion 5 reps each hour. Patient Education/HEP:   Continue recommended HEP/activities. ASSESSMENT       Assessment: Pt reported no pain after OT treatment. Pt making good  progress towards goals. Pt.'s wound is closing with good healing and very minimal drainage dorsal wound. Pins intact. Edema reduced approx 50-60% since eval and continues to improve. Pt. able to flex PIP/DIP joints better than MP joints due to edema. Can perform gross grasp to wrap digits around small foam ball. Post Treatment Pain: Pt denies pain    Patient's Activity Tolerance: good                 GOALS         Long Term Goals  Time Frame for Long term goals : 16-24 visit(6-8 weeks)  Long Term Goal 1: Patient will report pain 2 or less during functional activities  Long Term Goal 2: Patient  will be indep with all recommended HEP's, adaptive strategies, and adaptive techniques. Long Term Goal 3: Patient will report decreased frequency of numbness/tingling in RUE.   Long Term Goal 4: Patient will increase AROM of RUE wrist and hand to [facility] [place] to increase performance with I/ADL's  Long Term Goal 5: Patient  will increase RUE strength from current to [place] [place] to increase performance with I/ADL's  Long Term Goal 6: Patient  will increase R  strength from current to 80% norm for age and gender to increase performance with I/ADL's. Long Term Goal 7: Patient  will increase R pinch strength from current to 80% norm for age and gender  to increase performance with I/ADLs  Long Term Goal 8: Patient  will increase dexterity in R hand as observed by 9 hole peg test by performing [place] [place] for age and gender to increase performance with I/ADL's. Long Term Goal 9: Patient  will decrease edema in RUE to increase ROM and performance with I/ADL's. Long Term Goal 10: Pt. will utilize positioning and adaptive techniques to perform light weight work related activities indep. with RUE. TREATMENT PLAN   Continue 2-3 x week to reduce edema and improve functional use of R hand. Utilize modalities as able once wound is closed.            Electronically signed by ANIL Jansen  on 9/6/2022 at 9:07 AM

## 2022-09-07 ENCOUNTER — APPOINTMENT (OUTPATIENT)
Dept: OCCUPATIONAL THERAPY | Age: 47
End: 2022-09-07
Payer: COMMERCIAL

## 2022-09-08 ENCOUNTER — HOSPITAL ENCOUNTER (OUTPATIENT)
Dept: OCCUPATIONAL THERAPY | Age: 47
Setting detail: THERAPIES SERIES
Discharge: HOME | End: 2022-09-08
Payer: COMMERCIAL

## 2022-09-08 PROCEDURE — 97140 MANUAL THERAPY 1/> REGIONS: CPT

## 2022-09-08 PROCEDURE — 97530 THERAPEUTIC ACTIVITIES: CPT

## 2022-09-08 NOTE — PROGRESS NOTES
[facility] OCCUPATIONAL THERAPY     Occupational Therapy: Daily Note   Patient: Nova Lozano (7 y.o. male)   Date: 9831  Plan of Care Certification Period:      :  1975  MRN: [id number]  CSN: [id number]   Insurance: Payor: [address] [place] [address]  [address] / Plan: [address] [place] [address] [address] / Product Type: *No Product type* /   Insurance ID: [id number] - (Medicaid Managed) Secondary Insurance (if applicable):    Referring Physician: Benji Hodges MD     PCP: Hayley Arnold MD Visits to Date: Total # of Visits to Date: 7   Progress note:Progress Note Counter: 8  Visits Approved: 28    No Show:    Cancelled Appts:      Medical Diagnosis: Unspecified fracture of fifth metacarpal bone, right hand, initial encounter for open fracture [S62.306B]  Displaced fracture of base of fourth metacarpal bone, right hand, initial encounter for open fracture [S62.314B] Displaced fx. D4 & D5 RUE        Therapy Time    Time in 1000   Time out 1057   Total treatment minutes 57   Total time code minutes  57 Minutes        OT Manual therapy 15 minutes for 1 unit(s), CPT 17529  OT Therapeutic activities 42 minutes for 3 unit(s), CPT 13255       SUBJECTIVE EXAMINATION     Patient's date of birth confirmed: Yes     Pain Level:   Pt denies pain    Patient Comments:   \"I have been having some tingling in my fingers\"     Learning/Language barrier: No     HEP/Strategies/Orthosis Compliance: Patient verbally confirmed complaint with orthosis schedule. Patient verbally confirmed compliant with HEP's and adaptive strategies. Restrictions: No lifting or heavy use of R hand       OBJECTIVE EXAMINATION         TREATMENT     Focus of treatment was on the following:   decreasing fascial/soft tissue restrictions and edema management, increasing AROM of all digits R hand     MFR/Manual:   Pt treated seated on chair with R hand elevated on wedge cushion.  Retrograde massage and soft tissue mobilization of all digits, hands, and palm completed with lotion and with massage roller ball to soften tissue. Pt educated on continuing HEP to prevent edema. Pt was able to use RUE to grasp 2.5\" diameter cones demonstrating the ability to flex digits around the outside of the cone. Pt reached in varying directions to stack/unstack x6 cones during 2/2 trials. Pt performed full UE extension to push cones across the table. Pt was unable to oppose fingers to touch small peg this date. Pt performed AROM to grasp/release x10 soft foam balls. Pt demonstrated the ability to grasp and release 3\" diameter balls with varying textures x10. Pt able to identify different textures using fingertips. Pt rolled hard ball in palm for self tissue mobilization. Using hook and loop velcro board pt performed digit extension x10. Patient Education/HEP:   Continue recommended HEP/activities. ASSESSMENT       Assessment: Pt tolerated treatment well. Pt progressing well and is able to grasp objects with varying textures and sizes. Improved flexion/extension of all digits this date. Pt's edema reducing and wound is healing well. Post Treatment Pain: Pt denies pain    Patient's Activity Tolerance: Good                 GOALS         Long Term Goals  Time Frame for Long term goals : 16-24 visit(6-8 weeks)  Long Term Goal 1: Patient will report pain 2 or less during functional activities  Long Term Goal 2: Patient  will be indep with all recommended HEP's, adaptive strategies, and adaptive techniques. Long Term Goal 3: Patient will report decreased frequency of numbness/tingling in RUE. Long Term Goal 4: Patient will increase AROM of RUE wrist and hand to [facility] to increase performance with I/ADL's  Long Term Goal 5: Patient  will increase RUE strength from current to [facility] to increase performance with I/ADL's  Long Term Goal 6: Patient  will increase R  strength from current to 80% norm for age and gender to increase performance with I/ADL's.   Long Term Goal 7: Patient  will increase R pinch strength from current to 80% norm for age and gender  to increase performance with I/ADLs  Long Term Goal 8: Patient  will increase dexterity in R hand as observed by 9 hole peg test [facility] to increase performance with I/ADL's. Long Term Goal 9: Patient  will decrease edema in RUE to increase ROM and performance with I/ADL's. Long Term Goal 10: Pt. will utilize positioning and adaptive techniques to perform light weight work related activities indep. with RUE.          TREATMENT PLAN   Continue POC           Electronically signed by Irais Tavarez OT  on 9/8/2022 at 11:16 AM

## 2022-09-12 ENCOUNTER — OFFICE VISIT (OUTPATIENT)
Dept: ORTHOPEDIC SURGERY | Age: 47
End: 2022-09-12

## 2022-09-12 VITALS — WEIGHT: 250 LBS | BODY MASS INDEX: 35 KG/M2 | HEIGHT: 71 IN

## 2022-09-12 DIAGNOSIS — S62.306B OPEN DISPLACED FRACTURE OF FIFTH METACARPAL BONE OF RIGHT HAND, UNSPECIFIED PORTION OF METACARPAL, INITIAL ENCOUNTER: ICD-10-CM

## 2022-09-12 DIAGNOSIS — S62.609B OPEN FRACTURE OF PHALANX OF DIGIT OF HAND, INITIAL ENCOUNTER: Primary | ICD-10-CM

## 2022-09-12 PROCEDURE — 99024 POSTOP FOLLOW-UP VISIT: CPT | Performed by: ORTHOPAEDIC SURGERY

## 2022-09-12 RX ORDER — SILICONES/ADHESIVE TAPE
1 COMBINATION PACKAGE (EA) TOPICAL 2 TIMES DAILY PRN
Qty: 28 G | Refills: 0 | Status: SHIPPED | OUTPATIENT
Start: 2022-09-12

## 2022-09-12 NOTE — PROGRESS NOTES
6 weeks out right hand crush injury status post debridement with ORIF. Overall he is pleased with his progress. He is attending therapy. Physical exam: Right hand with significant swelling and intrinsic minus positioning of the digits. He does have a scar contracture to the ring finger limiting extension at the PIP joint. No signs of infection. He does have marginal necrosis of the large dorsal skin flap particular around pin sites distally. No overt signs of infection. Pins were removed at today's visit. Composite flexion limited particular at the MCP joints of the index through little fingers. X-rays the office today: AP lateral obliques of his right hand were obtained compared to images from August 11 as well as August 1 of this year. There is stable fixation and callus formation of his fourth and fifth metacarpal fractures and small finger proximal phalanx fracture. Pins across the index middle metacarpals unchanged. Impression and office x-rays: Stable fixation right hand fractures    Assessment 6 weeks out severe right hand crush injury with marginal flap necrosis status post ORIF of thumb fifth metacarpals and little finger proximal phalanx fracture with intermetacarpal ligament repair between index and middle    Plan    Biggest finding at today's visit was significant mount of swelling and stiffness of the hand. He does have MCP joint extension contractures index through little fingers. His flap was debrided. Does have excellent secondary healing over the dorsal hand. Pins were removed from the index middle finger metacarpals. May advance in therapy as tolerated. Follow-up in 4 to 6 weeks with x-rays of the hand. Local wound care with Polysporin and daily dressing changes were explained and demonstrated formant today in the office. Thank you for your referral.  NIGEL Coyle, PA-C  Orthopedic Surgery, Upper Extremity [phone]

## 2022-09-13 ENCOUNTER — HOSPITAL ENCOUNTER (OUTPATIENT)
Dept: OCCUPATIONAL THERAPY | Age: 47
Setting detail: THERAPIES SERIES
Discharge: HOME | End: 2022-09-13
Payer: COMMERCIAL

## 2022-09-13 PROCEDURE — 97140 MANUAL THERAPY 1/> REGIONS: CPT

## 2022-09-13 PROCEDURE — 97530 THERAPEUTIC ACTIVITIES: CPT

## 2022-09-13 NOTE — PROGRESS NOTES
[facility]     Occupational Therapy: Daily Note   Patient: Caren Chan (33 y.o. male)   Date: 89/15/9334  Plan of Care Certification Period:  22   :  1975  MRN: [id number]  CSN: [id number]   Insurance: Payor: [address] [place] [address]  [address] / Plan: [address] [place] [address] [address] / Product Type: *No Product type* /   Insurance ID: [id number] - (Medicaid Managed) Secondary Insurance (if applicable):    Referring Physician: Cole Jimenez MD     PCP: Dawn Flores MD Visits to Date: Total # of Visits to Date: 9   Progress note:Progress Note Counter: 9  Visits Approved: 28    No Show:    Cancelled Appts:      Medical Diagnosis: Unspecified fracture of fifth metacarpal bone, right hand, initial encounter for open fracture [S62.306B]  Displaced fracture of base of fourth metacarpal bone, right hand, initial encounter for open fracture [S62.314B] Displaced fx. D4 & D5 RUE        Therapy Time    Time in 0801   Time out 0900   Total treatment minutes 59   Total time code minutes  59 Minutes        OT Manual therapy 33 minutes for 2 unit(s), CPT 59792  OT Therapeutic activities 26 minutes for 2 unit(s), CPT 14849       SUBJECTIVE EXAMINATION     Patient's date of birth confirmed: Yes     Pain Level:   2/10    Patient Comments:   \"I have a dull ache throughout my hand, I have numbness and tingling in my hand. \"    Learning/Language barrier: no     HEP/Strategies/Orthosis Compliance: Patient verbally confirmed compliant with HEP's     Restrictions: per physician's orders      OBJECTIVE EXAMINATION     TREATMENT     Focus of treatment was on the following:   Increasing AROM/PROM  of R hand      MFR/Manual:   Pt treated seated on chair with RUE elevated on wedge with hand above heart level. Pt.'s hand and wound cleaned/debrided with warm soapy cloth while performing deep pressure retrograde massage with cloth over unopened areas of hand.   Soft tissue mobilization of palm, wrist and forearm performed with mod pressure to tolerance. Joint mob of MPs, PIPs and DIP joints completed each digit. Followed with active hand pumping with RUE raised above head. Exercises/Activities:  Pt. instructed in and performed HEP for digit flexion using red therasponge. Pt. instructed to perform 10 reps every 2 hours with wrist in extension, neutral and flexed position. Pt. completed 5 reps each position with good follow through. Digit blocking AAROM/PROM completed each digit, 10 reps. AROM thumb opposition to each digit performed 5 reps. AROM composite fist while performing wrist circumduction x 10 reps each direction       Patient Education/HEP:   Continue recommended HEP/activities. , hand strengthening: Patient issued red foam block. , Pt completed 5 reps to demo understanding. Pt with good follow through. ASSESSMENT       Assessment: Pt tolerated treatment well. Pt reported increased  pain after OT treatment. Pt making good  progress towards goals. Small piece of debri removed from palmar scar near distal palmar crease. Wound approximating well, as scab removed by surgeon yesterday. No drainage or infection noted. Wound bright pink with minimal yellow edges. Scar at base of ring finger limiting extension at this time. Pt. demo good follow through with all HEPs. Post Treatment Pain: 4/10    Patient's Activity Tolerance: good                 GOALS         Long Term Goals  Time Frame for Long term goals : 16-24 visit(6-8 weeks)  Long Term Goal 1: Patient will report pain 2 or less during functional activities  Long Term Goal 2: Patient  will be indep with all recommended HEP's, adaptive strategies, and adaptive techniques. Long Term Goal 3: Patient will report decreased frequency of numbness/tingling in RUE.   Long Term Goal 4: Patient will increase AROM of RUE wrist and hand to [facility] to increase performance with I/ADL's  Long Term Goal 5: Patient  will increase RUE strength from

## 2022-09-14 ENCOUNTER — HOSPITAL ENCOUNTER (OUTPATIENT)
Dept: OCCUPATIONAL THERAPY | Age: 47
Setting detail: THERAPIES SERIES
Discharge: HOME | End: 2022-09-14
Payer: COMMERCIAL

## 2022-09-14 PROCEDURE — 97530 THERAPEUTIC ACTIVITIES: CPT

## 2022-09-14 PROCEDURE — 97140 MANUAL THERAPY 1/> REGIONS: CPT

## 2022-09-14 PROCEDURE — 97035 APP MDLTY 1+ULTRASOUND EA 15: CPT

## 2022-09-14 NOTE — PROGRESS NOTES
[facility]     Occupational Therapy: Daily Note   Patient: Marciano Roberts (3 y.o. male)   Date:   Plan of Care Certification Period:  22   :  1975  MRN: [id number]  CSN: [id number]   Insurance: Payor: [address] [facility]  [address] / Plan: [address] [place] [address] [address] / Product Type: *No Product type* /   Insurance ID: [id number] - (Medicaid Managed) Secondary Insurance (if applicable):    Referring Physician: Dong Clancy MD     PCP: Shilpa Rogers MD Visits to Date: Total # of Visits to Date: 10   Progress note:Progress Note Counter: 10  Visits Approved: 28    No Show:    Cancelled Appts:      Medical Diagnosis: Unspecified fracture of fifth metacarpal bone, right hand, initial encounter for open fracture [S62.306B]  Displaced fracture of base of fourth metacarpal bone, right hand, initial encounter for open fracture [S62.314B] Displaced fx. D4 & D5 RUE        Therapy Time    Time in 0800   Time out 0900   Total treatment minutes 60   Total time code minutes  60 Minutes        OT Manual therapy 35 minutes for 2 unit(s), CPT 85223  OT Therapeutic activities 17 minutes for 1 unit(s), CPT 27701  OT Ultrasound 8 minutes for 1 unit(s), CPT 81677       SUBJECTIVE EXAMINATION     Patient's date of birth confirmed: Yes     Pain Level:   Pt denies pain    Patient Comments:   \"I was very sore and achy after yesterday's treatment, but it didn't last.\"\"I was able to put my deodorant on under my L arm using my R hand for the first time. \"    Learning/Language barrier: no       HEP/Strategies/Orthosis Compliance: Patient verbally confirmed compliant with HEP's Patient demo understanding.      OBJECTIVE EXAMINATION        EDEMA: Circumferential  Measurements cm              RIGHT(CURRENT)                RIGHT(INITIAL)   Distal Crease 27.6 30   Metacarpal phalangeal 27 27.9        TREATMENT     Focus of treatment was on the following:   reducing edema, improving soft tissue mob of R hand and incr AROM RUE.      MFR/Manual:   Pt treated seated on chair with arm elevated after US treatment. Joint mobilization of each joint, each digit followed by soft tissue mobilization of fingers and palm tolerated well. Utilized 3 prong roller vibrator for palmar massage and stimulation to all aspects of the hand. Pt. participated in AROM tasks grasping 2\" diameter head of vibrator while vibrating and then pressing palm into vibrator while extending digits to available range, 10 reps each motion. Pt. was able to grasp vibrator and pick it up 5 trials with R hand. Isolated digit flexion AROM performed each digit with AAROM/ passive assist at MP joints. Thermal ultrasound intensity 1 MHz at 1.5 Acuña/cm2 pulsed 50% over palmar surface of R hand to decrease pain and soft tissue restrictions. Patient Education/HEP:   Continue recommended HEP/activities. ASSESSMENT       Assessment: Pt reported no pain after OT treatment. Pt making good  progress towards goals. Pt. was able to lift 2\" diameter vibrator and hold his deodorant with R hand. Wound continues to heal well. Small fluid filled area noted at base of ring finger scar without drainage. Post Treatment Pain: Pt denies pain    Patient's Activity Tolerance: good                 GOALS         Long Term Goals  Time Frame for Long term goals : 16-24 visit(6-8 weeks)  Long Term Goal 1: Patient will report pain 2 or less during functional activities  Long Term Goal 2: Patient  will be indep with all recommended HEP's, adaptive strategies, and adaptive techniques. Long Term Goal 3: Patient will report decreased frequency of numbness/tingling in RUE.   Long Term Goal 4: Patient will increase AROM of RUE wrist and hand to [facility] to increase performance with I/ADL's  Long Term Goal 5: Patient  will increase RUE strength from current to [facility] to increase performance with I/ADL's  Long Term Goal 6: Patient  will increase R  strength from current to 80% norm for age and gender to increase performance with I/ADL's. Long Term Goal 7: Patient  will increase R pinch strength from current to 80% norm for age and gender  to increase performance with I/ADLs  Long Term Goal 8: Patient  will increase dexterity in R hand as observed by 9 hole peg test by performing [facility] for [facility] and gender to increase performance with I/ADL's. Long Term Goal 9: Patient  will decrease edema in RUE to increase ROM and performance with I/ADL's. Long Term Goal 10: Pt. will utilize positioning and adaptive techniques to perform light weight work related activities indep. with RUE. TREATMENT PLAN   3 x week for wound healing, scar mobility and improving function of R hand.            Electronically signed by ANIL Wheatley  on 9/14/2022 at 9:08 AM

## 2022-09-15 ENCOUNTER — HOSPITAL ENCOUNTER (OUTPATIENT)
Dept: OCCUPATIONAL THERAPY | Age: 47
Setting detail: THERAPIES SERIES
Discharge: HOME | End: 2022-09-15
Payer: COMMERCIAL

## 2022-09-15 PROCEDURE — 97140 MANUAL THERAPY 1/> REGIONS: CPT

## 2022-09-15 PROCEDURE — 97530 THERAPEUTIC ACTIVITIES: CPT

## 2022-09-15 PROCEDURE — 97032 APPL MODALITY 1+ESTIM EA 15: CPT

## 2022-09-15 NOTE — PROGRESS NOTES
[facility]     Occupational Therapy: Daily Note   Patient: Leopoldo De Dios (40 y.o. male)   Date:   Plan of Care Certification Period:  22   :  1975  MRN: [id number]  CSN: [id number]   Insurance: Payor: [address] [facility]  [address] / Plan: [address] [place] [address] [address] / Product Type: *No Product type* /   Insurance ID: [id number] - (Medicaid Managed) Secondary Insurance (if applicable):    Referring Physician: Elise Warren MD     PCP: Adan Zaidi MD Visits to Date: Total # of Visits to Date: 11   Progress note:Progress Note Counter: 11  Visits Approved: 28    No Show:    Cancelled Appts:      Medical Diagnosis: Unspecified fracture of fifth metacarpal bone, right hand, initial encounter for open fracture [S62.306B]  Displaced fracture of base of fourth metacarpal bone, right hand, initial encounter for open fracture [S62.314B] Displaced fx. D4 & D5 RUE        Therapy Time    Time in 0803   Time out 0900   Total treatment minutes 57   Total time code minutes  57 Minutes        OT Electrical stimulation attended 10 minutes for 1 unit(s), CPT 92776   OT Manual therapy 15 minutes for 1 unit(s), CPT 86076  OT Therapeutic activities 32 minutes for 2 unit(s), CPT 01309       SUBJECTIVE EXAMINATION     Patient's date of birth confirmed: Yes     Pain Level:   Pt denies pain    Patient Comments:   \"I woke up with my bandage off and my hand laying on my face. No pain though. I was also able to put my deodorant on 2 days in a row! \"    Learning/Language barrier: no     HEP/Strategies/Orthosis Compliance: Patient verbally confirmed compliant with HEP's Patient demo understanding.      OBJECTIVE EXAMINATION   Opposition:  Distance in cm from tip of thumb          index:  .5 cm           middle:  1.9 cm            Rin cm  (able to lightly prehend to tip)           Little:   4.9 cm   TREATMENT     Focus of treatment was on the following:   Increasing A/PROM of R hand, digits. MFR/Manual:   Pt treated seated on chair with arm elevated. Retrograde massage and soft tissue mobilization of all digits, hand, and palm completed with lotion. AAROM/PROM provided to digits to promote MP/composite finger flexion. AROM thumb opposition to each digit performed 3 reps. Utilized vibration for palmar massage and stimulation to all aspects of the hand with pt extending digits to available range. Exercises/Activities:  Pt participated in AROM tasks seated at the table. Pt opposed digits to grasp and stack 1\" cubes x 25 in towers of 5 with R hand. Pt used palmar pinch to place x5 small pegs onto 10x10 peg board with R hand then removed and placed back into container. Pt demonstrated the ability to grasp and release balls with various diameters and textures x3. NMES at intensity 19.0 over R wrist extensors for 10 mins to facilitate wrist/digit extension. Pt encouraged to complete AROM to available range for extension during NMES. Pt elevated RUE above head with active finger pumping to decrease edema. Patient Education/HEP:   Continue recommended HEP/activities. Pt encouraged to continue with edema management and massage at home. ASSESSMENT       Assessment: Pt tolerated treatment well. Pt denied pain after OT treatment but reported increased stiffness and mild swelling. Pt's wound is healing well. Pt with increased AROM for flexion of digits and demonstrated the ability to oppose 4th digit to thumb this date. Pt can perform palmar pinch to  small peg. Post Treatment Pain: Pt denies pain    Patient's Activity Tolerance: Good                  GOALS         Long Term Goals  Time Frame for Long term goals : 16-24 visit(6-8 weeks)  Long Term Goal 1: Patient will report pain 2 or less during functional activities  Long Term Goal 2: Patient  will be indep with all recommended HEP's, adaptive strategies, and adaptive techniques.   Long Term Goal 3: Patient will report decreased frequency of numbness/tingling in RUE. Long Term Goal 4: Patient will increase AROM of RUE wrist and hand to [facility] [place] to increase performance with I/ADL's  Long Term Goal 5: Patient  will increase RUE strength from current to [place] [place] to increase performance with I/ADL's  Long Term Goal 6: Patient  will increase R  strength from current to 80% norm for age and gender to increase performance with I/ADL's. Long Term Goal 7: Patient  will increase R pinch strength from current to 80% norm for age and gender  to increase performance with I/ADLs  Long Term Goal 8: Patient  will increase dexterity in R hand as observed by 9 hole peg test by performing [place] [place] for age and gender to increase performance with I/ADL's. Long Term Goal 9: Patient  will decrease edema in RUE to increase ROM and performance with I/ADL's. Long Term Goal 10: Pt. will utilize positioning and adaptive techniques to perform light weight work related activities indep. with RUE.          TREATMENT PLAN   Continue POC           Electronically signed by ANIL Jiang  on 9/15/2022 at 9:11 AM

## 2022-09-20 ENCOUNTER — HOSPITAL ENCOUNTER (OUTPATIENT)
Dept: OCCUPATIONAL THERAPY | Age: 47
Setting detail: THERAPIES SERIES
Discharge: HOME | End: 2022-09-20
Payer: COMMERCIAL

## 2022-09-20 PROCEDURE — 97530 THERAPEUTIC ACTIVITIES: CPT

## 2022-09-20 PROCEDURE — 97140 MANUAL THERAPY 1/> REGIONS: CPT

## 2022-09-20 PROCEDURE — 97035 APP MDLTY 1+ULTRASOUND EA 15: CPT

## 2022-09-20 NOTE — PROGRESS NOTES
[facility]     Occupational Therapy: Daily Note   Patient: Purnima Rausch (8 y.o. male)   Date:   Plan of Care Certification Period:  22   :  1975  MRN: [id number]  CSN: [id number]   Insurance: Payor: [address] [facility]  [address] / Plan: [facility] [facility] [address] / Product Type: *No Product type* /   Insurance ID: [id number] - (Medicaid Managed) Secondary Insurance (if applicable):    Referring Physician: Charu Huffman MD     PCP: Blaine Kruse MD Visits to Date: Total # of Visits to Date: 12   Progress note:Progress Note Counter: 12  Visits Approved: 28    No Show:    Cancelled Appts:      Medical Diagnosis: Unspecified fracture of fifth metacarpal bone, right hand, initial encounter for open fracture [S62.306B]  Displaced fracture of base of fourth metacarpal bone, right hand, initial encounter for open fracture [S62.314B] Displaced fx. D4 & D5 RUE        Therapy Time    Time in 0800   Time out 0903   Total treatment minutes 63   Total time code minutes  63 Minutes        OT Manual therapy 18 minutes for 1 unit(s), CPT 20135  OT Therapeutic activities 37 minutes for 2 unit(s), CPT 88132  OT thermal ultrasound 8 minutes for 1 unit, CPT 98416     SUBJECTIVE EXAMINATION     Patient's date of birth confirmed: Yes     Pain Level:   Pt denies pain    Patient Comments:   \"I am starting to have some pain in my index and middle fingers, but it is not all the time. \"    Learning/Language barrier: no     HEP/Strategies/Orthosis Compliance: Patient verbally confirmed compliant with HEP's Patient demo understanding. OBJECTIVE EXAMINATION     TREATMENT     Focus of treatment was on the following:   Increasing ROM R hand      MFR/Manual:   Soft tissue mobilization of entire R hand and forearm performed with cross friction massage all scars, especially base of ring finger scar. Coband wrapped digits and performed AAROM digit blocking each digit x 8 reps each. strength from current to 80% norm for age and gender  to increase performance with I/ADLs  Long Term Goal 8: Patient  will increase dexterity in R hand as observed by 9 hole peg test by performing [facility] for age and gender to increase performance with I/ADL's. Long Term Goal 9: Patient  will decrease edema in RUE to increase ROM and performance with I/ADL's. Long Term Goal 10: Pt. will utilize positioning and adaptive techniques to perform light weight work related activities indep. with RUE. TREATMENT PLAN   Measure progress this week, continue to improve A/PROM of digits and reduce edema for improved function of hand.            Electronically signed by YOBANI Trevino/L  on 9/20/2022 at 9:22 AM

## 2022-09-21 ENCOUNTER — HOSPITAL ENCOUNTER (OUTPATIENT)
Dept: OCCUPATIONAL THERAPY | Age: 47
Setting detail: THERAPIES SERIES
Discharge: HOME | End: 2022-09-21
Payer: COMMERCIAL

## 2022-09-21 PROCEDURE — 97530 THERAPEUTIC ACTIVITIES: CPT

## 2022-09-21 PROCEDURE — 97140 MANUAL THERAPY 1/> REGIONS: CPT

## 2022-09-21 PROCEDURE — 97035 APP MDLTY 1+ULTRASOUND EA 15: CPT

## 2022-09-21 NOTE — PROGRESS NOTES
[facility]     Occupational Therapy: Daily Note   Patient: Nic Redmond (22 y.o. male)   Date:   Plan of Care Certification Period:  22   :  1975  MRN: [id number]  CSN: [id number]   Insurance: Payor: [facility]  [address] / Plan: [facility] [facility] [address] / Product Type: *No Product type* /   Insurance ID: [id number] - (Medicaid Managed) Secondary Insurance (if applicable):    Referring Physician: Shad Dockery MD     PCP: Indu Jiang MD Visits to Date: Total # of Visits to Date: 13   Progress note:Progress Note Counter: 13  Visits Approved: 28    No Show:    Cancelled Appts:      Medical Diagnosis: Unspecified fracture of fifth metacarpal bone, right hand, initial encounter for open fracture [S62.306B]  Displaced fracture of base of fourth metacarpal bone, right hand, initial encounter for open fracture [S62.314B] Displaced fx. D4 & D5 RUE        Therapy Time    Time in 0800   Time out 0900   Total treatment minutes 60   Total time code minutes  60 Minutes        OT Manual therapy 15 minutes for 1 unit(s), CPT 97104  OT Therapeutic activities 37 minutes for 2 unit(s), CPT 37531  OT Ultrasound 8 minutes for 1 unit(s), CPT 20334       SUBJECTIVE EXAMINATION     Patient's date of birth confirmed: Yes     Pain Level:   Pt denies pain, rates soreness 3-4/10    Patient Comments:   \"I don't have pain, but I do have achiness in my hand. Especially now that it is getting more movement. \"    Learning/Language barrier: no     HEP/Strategies/Orthosis Compliance: Patient verbally confirmed compliant with HEP's Patient demo understanding. Patient with good. follow through       [employer] of treatment was on the following:   Improving AROM R hand and use in functional tasks. MFR/Manual:   Retrograde massage, soft tissue mobilization and Coband wrapping of R digits and hand performed.  Followed with fitting pt. with Joint meir splint for R ring finger soft tissue stretch at PIP joint due to scar tissue . Pt. to wear splint for 1 hr. increments, 2-3 times a day for prolonged stretch of scar tissue at base of finger. Exercises/Activities with RUE:  AAROM flexion each joint, each finger x 8 reps for increasing ROM. Digit blocking ex. MP joints each digit x 8 reps. Pressing flat palm into yellow power web for wrist/finger extension, 10 reps  Actively rolling up a folded towel w/ RUE using all digits and wrist motion, 10 reps  Actively grasping 5\", 4.5\" and 4\" circumference handle repetitively, 3 reps each  Isolated active digit extension with palm flat on table, 5 reps each. Thermal ultrasound intensity 1 MHz pulsed 50% at 1.5 Acuña/cm2 over palmar surface/scars to decrease pain and soft tissue restrictions. Patient Education/HEP:   Continue recommended HEP/activities. , Joint meir splint       ASSESSMENT       Assessment: Pt tolerated treatment well. Pt reported soreness and fatigue pain after OT treatment. Pt making good  progress towards goals. Grasp AROM improving as pt. able to grasp 4\" circumference handle repetitively. Pt. demo good understanding of application and use of joint meir splint R ring finger. Good follow through with all HEPs noted. Wound healing well on dorsal surface of hand, remains open 5 cm x 2.6 cm with good granulation and color. Pt. continues with Polysporin and light dressing over wound. Post Treatment Pain: 4/10    Patient's Activity Tolerance: good                 GOALS         Long Term Goals  Time Frame for Long term goals : 16-24 visit(6-8 weeks)  Long Term Goal 1: Patient will report pain 2 or less during functional activities  Long Term Goal 2: Patient  will be indep with all recommended HEP's, adaptive strategies, and adaptive techniques. Long Term Goal 3: Patient will report decreased frequency of numbness/tingling in RUE.   Long Term Goal 4: Patient will increase AROM of RUE wrist and hand to [facility] [place] to increase performance with I/ADL's  Long Term Goal 5: Patient  will increase RUE strength from current to [facility] [place] to increase performance with I/ADL's  Long Term Goal 6: Patient  will increase R  strength from current to 80% norm for age and gender to increase performance with I/ADL's. Long Term Goal 7: Patient  will increase R pinch strength from current to 80% norm for age and gender  to increase performance with I/ADLs  Long Term Goal 8: Patient  will increase dexterity in R hand as observed by 9 hole peg test by performing [place] [place] for age and gender to increase performance with I/ADL's. Long Term Goal 9: Patient  will decrease edema in RUE to increase ROM and performance with I/ADL's. Long Term Goal 10: Pt. will utilize positioning and adaptive techniques to perform light weight work related activities indep. with RUE. TREATMENT PLAN   Measure progress and function R hand next visit.            Electronically signed by ANIL Burnett  on 9/21/2022 at 10:03 AM

## 2022-09-22 ENCOUNTER — HOSPITAL ENCOUNTER (OUTPATIENT)
Dept: OCCUPATIONAL THERAPY | Age: 47
Setting detail: THERAPIES SERIES
Discharge: HOME | End: 2022-09-22
Payer: COMMERCIAL

## 2022-09-22 PROCEDURE — 97530 THERAPEUTIC ACTIVITIES: CPT

## 2022-09-22 NOTE — PROGRESS NOTES
OCCUPATIONAL THERAPY PLAN OF CARE     [facility]   [address] [place]  Phone: [phone]    [] Certification     [] Recertification     [] Plan of Care    [x] Progress Note        Date: 2022    To:Referring Practitioner: Dr. Joni Wright          From: Milo Carrasco, OTR/L  Patient: Jasmina Nicolas       : 1975  MRN: [id number]  Diagnosis:Diagnosis: Displaced fx. D4 & D5 RUE   Date of eval: 2022    Visit Information:   Onset Date: 22  OT Insurance Information: [place] Medicaid  Total # of Visits Approved: 28  Total # of Visits to Date: 15  Certification Period Expiration Date: 22  Progress Note Due Date: 22  Progress Note Counter: 15    Last POC date: 2022   Reporting period: -2022                             Assessment:    Goals Current/Discharge status  Met   Long Term Goal 1: Patient will report pain 2 or less during functional activities Pt. reports soreness, no pain. [x] Met  [] Partially Met  [] Not Met   Long Term Goal 2: Patient  will be indep with all recommended HEP's, adaptive strategies, and adaptive techniques. [] Met  [x] Partially Met  [] Not Met   Long Term Goal 3: Patient will report decreased frequency of numbness/tingling in RUE. Improving, however continues to have occasional aches and tingling throughout hand. [] Met  [x] Partially Met  [] Not Met   Long Term Goal 4: Patient will increase AROM of RUE wrist and hand to [facility] to increase performance with I/ADL's See chart below [] Met  X Partially Met  [] Not Met   Long Term Goal 5: Patient  will increase RUE strength from current to [facility] to increase performance with I/ADL's NT at this time. [] Met  [] Partially Met  [x] Not Met   Long Term Goal 6: Patient  will increase R  strength from current to 80% norm for age and gender to increase performance with I/ADL's. Pt. now able to  2 lbs.   See chart below [] Met  [x] Partially Manual Techniques       conservation, work simplification [x] Instruction in HEP       joint protection, body mechanics [] Aquatic Therapy   [x] Modalities: [x] Ultrasound   [] Infrared [x] Electrical Stimulation [x] Fluidotherapy                          [] Hot/Cold Pack [] Paraffin    [] Other:                             Frequency:  2-3 days per week  Duration:  14 visits     Rehab Potential: [] Excellent [x] Good  [] Fair  [] Poor      Patient Status:    [x] Continue/Initate plan of Care                    []  Discharge OT         []  Additional visits requested, please re-certify for additional visits        Electronically signed by: ANIL Morel 9/22/2022 9:23 AM    If you have any questions or concerns, please don't hesitate to call. Thank you for your referral.      I have reviewed this plan of care and certify a need for medically necessary rehabilitation services.     Physician Signature:__________________________________________________________    Date: 9/22/2022  Please sign and return

## 2022-09-22 NOTE — PROGRESS NOTES
[facility]CCUP[facility]ERAP[facility]     Occupational Therapy: Daily Note   Patient: Halie Cummins (25 y.o. male)   Date:   Plan of Care Certification Period:  22   :  1975  MRN: [id number]  CSN: [id number]   Insurance: Payor: [facility]  [address] / Plan: [facility] [facility] [address] / Product Type: *No Product type* /   Insurance ID: [id number] - (Medicaid Managed) Secondary Insurance (if applicable):    Referring Physician: Eber Colindres MD     PCP: Pablo Poole MD Visits to Date: Total # of Visits to Date: 14   Progress note:Progress Note Counter: 14  Visits Approved: 28    No Show:    Cancelled Appts:      Medical Diagnosis: Unspecified fracture of fifth metacarpal bone, right hand, initial encounter for open fracture [S62.306B]  Displaced fracture of base of fourth metacarpal bone, right hand, initial encounter for open fracture [S62.314B] Displaced fx. D4 & D5 RUE        Therapy Time    Time in 0800   Time out 0900   Total treatment minutes 60   Total time code minutes  60 Minutes        OT Therapeutic activities 60 minutes for 4 unit(s), CPT 15857       SUBJECTIVE EXAMINATION     Patient's date of birth confirmed: Yes     Pain Level:   Pt denies pain    Patient Comments: \"My hand was really sore yesterday, but today it is just stiff from the swelling. \"    Learning/Language barrier: no     HEP/Strategies/Orthosis Compliance: Patient verbally confirmed compliant with HEP's Patient demo understanding.     OBJECTIVE EXAMINATION       Right       MMT A Norm    Shoulder         Flexion 5/5 WFL 0-180   Abduction 5/5 WFL 0-180   Internal Rotation 5/5 WFL 0-80   External Rotation 5/5 WFL 0-60   Elbow         Flexion 5/5 WFL 0-150   Extension 5/5 -0   Pronation 5/5 WFL 0-80   Supination 5/5 WFL 0-80   Wrist         Flexion NT 68 0-70   Extension  NT 60 0-60   Ulnar deviation NT 30 0-30   Radial Deviation  NT 20 0-20        Right   Normal    MP PIP DIP       0-90 0-100 0-70       A P A P A P   Index                 Extension    NT WFL NT WFL NT   Flexion   26 NT 63 NT 34 NT   Middle                 Extension   WFL NT -45 NT WFL NT   Flexion   17 NT 75 NT 56 NT   Ring                 Extension   WFL NT -48 NT WFL NT   Flexion    29 NT 74 NT 48 NT   Little                  Extension   WFL NT WFL NT WFL NT   Flexion    34 NT 74 NT 65 NT   Middle and ring digit extension partially limited due to thickened, shortened scar tissue at base of ring finger, near web space. Opposition  Right Hand: Can oppose to tip of index and ring finger only  Left Hand: WFL     Distance Thumb Tip from Head of 5th MC: measurements cm  Right Hand: 4.4 cm  Left Hand: WFL    Edema  Circumferential measurements cm     Right (initial) Right   (curent)   Distal Crease 30 27.5   Wrist (across styloid) 21.6 20.9   Metacarpal Phalangeal 27.9 26      1 trial setting #2     RIGHT              CURRENT      Right                 Eval      Right              Norm   LEFT  CURRENT      LEFT        Eval     Left     Norm       [place] lb. 2  106 NT 95          TREATMENT     Focus of treatment was on the following:   increasing ROM R hand and assess for progress toward goals     Re-measure RUE as above. Exercises/Activities RUE:  Gripping and squeezing foam ball x 3 sec hold, 10 reps  Pressing with palm and fingers extended down onto foam ball x 10 reps  Gripping and squeezing mildly firmer foam ball x 3 sec hold, 5 reps  AAROM making composite fist as able and gently pressing back of MPs into folded towel on table to encourage MP flexion, x 10  Instructed pt. in utilizing light compression stockinette over hand to hold dressing in place and provide compression for edema. Pt. able to don/doff stockinette independently. Patient was screened for contraindications prior to use of modality. Fluidotherapy at 115° F for 10 minutes while completing AROM R hand with wound covered with bandage and Coband wrap. Patient Education/HEP:    Use of stockinette compression sleeve. Pt. demo good understanding. ASSESSMENT       Assessment: Pt reported no pain after OT treatment. Pt making good  progress towards goals. Pt. is able to now oppose to 2 of 4 digits with thumb, demonstrates measurable  of 2 lbs. and able to  2 1/2\" ball in R hand. Pt. demo good understanding and follow through with HEPs. Post Treatment Pain: Pt denies pain    Patient's Activity Tolerance: good                 GOALS         Long Term Goals  Time Frame for Long term goals : 16-24 visit(6-8 weeks)  Long Term Goal 1: Patient will report pain 2 or less during functional activities  Long Term Goal 2: Patient  will be indep with all recommended HEP's, adaptive strategies, and adaptive techniques. Long Term Goal 3: Patient will report decreased frequency of numbness/tingling in RUE. Long Term Goal 4: Patient will increase AROM of RUE wrist and [facility] to increase performance with I/ADL's  Long Term Goal 5: Patient  will increase RUE strength from current to [place] [place] to increase performance with I/ADL's  Long Term Goal 6: Patient  will increase R  strength from current to 80% norm for age and gender to increase performance with I/ADL's. Long Term Goal 7: Patient  will increase R pinch strength from current to 80% norm for age and gender  to increase performance with I/ADLs  Long Term Goal 8: Patient  will increase dexterity in R hand as observed by 9 hole peg test by performing [place] [place] for age and gender to increase performance with I/ADL's. Long Term Goal 9: Patient  will decrease edema in RUE to increase ROM and performance with I/ADL's. Long Term Goal 10: Pt. will utilize positioning and adaptive techniques to perform light weight work related activities indep. with RUE. TREATMENT PLAN   Joint mob and compression material to R hand to decrease edema and improve AROM/PROM entire hand.          Electronically signed by Kecia Landis, OTR/L  on 9/22/2022 at 9:11 AM

## 2022-09-27 ENCOUNTER — HOSPITAL ENCOUNTER (OUTPATIENT)
Dept: OCCUPATIONAL THERAPY | Age: 47
Setting detail: THERAPIES SERIES
Discharge: HOME | End: 2022-09-27
Payer: COMMERCIAL

## 2022-09-27 NOTE — PROGRESS NOTES
Therapy                            Cancellation/No-show Note    Date: 2022  Patient Name: Cierra Colorado    : 1975  (48 y.o.)     MRN: [id number]    Account #: [de-identified]            Comments: For today's appointment patient:  [x]  Cancelled  []  Rescheduled appointment  []  No-show   []  Called pt to remind of next appointment     Reason given by patient:  []  Patient ill  []  Conflicting appointment  []  No transportation    []  Conflict with work  []  No reason given  [x]  Other:  child ill    [x] Pt has future appointments scheduled, no follow up needed  [] Pt requests to be on hold. Reason:   If > 2 weeks please discuss with therapist.  [] Therapist to call pt for follow up     Signature:  ANIL Remy 2022 10:58 AM

## 2022-09-29 ENCOUNTER — HOSPITAL ENCOUNTER (OUTPATIENT)
Dept: OCCUPATIONAL THERAPY | Age: 47
Setting detail: THERAPIES SERIES
Discharge: HOME | End: 2022-09-29
Payer: COMMERCIAL

## 2022-09-29 PROCEDURE — 97110 THERAPEUTIC EXERCISES: CPT

## 2022-09-29 PROCEDURE — 97140 MANUAL THERAPY 1/> REGIONS: CPT

## 2022-10-03 ENCOUNTER — HOSPITAL ENCOUNTER (OUTPATIENT)
Dept: OCCUPATIONAL THERAPY | Age: 47
Setting detail: THERAPIES SERIES
Discharge: HOME | End: 2022-10-03
Payer: COMMERCIAL

## 2022-10-03 PROCEDURE — 97110 THERAPEUTIC EXERCISES: CPT

## 2022-10-03 PROCEDURE — 97140 MANUAL THERAPY 1/> REGIONS: CPT

## 2022-10-03 NOTE — PROGRESS NOTES
[facility]     Occupational Therapy: Daily Note   Patient: Brigido Romero (43 y.o. male)   Date:   Plan of Care Certification Period:      :  1975  MRN: [id number]  CSN: [id number]   Insurance: Payor: [address] [place] [address] [address] / Plan: [address] [place] [address] [address] / Product Type: *No Product type* /   Insurance ID: [id number] - (Medicaid Managed) Secondary Insurance (if applicable):    Referring Physician: Magui Looney MD     PCP: Orlando Baer MD Visits to Date: Total # of Visits to Date: 12   Progress note:Progress Note Counter: 16  Visits Approved: 28    No Show:    Cancelled Appts:      Medical Diagnosis: Unspecified fracture of fifth metacarpal bone, right hand, initial encounter for open fracture [S62.306B]  Displaced fracture of base of fourth metacarpal bone, right hand, initial encounter for open fracture [S62.314B] Displaced fx. D4 & D5 RUE        Therapy Time    Time in 0800   Time out 0858   Total treatment minutes 58   Total time code minutes           OT Manual therapy 28 minutes for 2 unit(s), CPT 04335  OT Therapeutic exercises 30 minutes for 2 unit(s), CPT 05633       SUBJECTIVE EXAMINATION     Patient's date of birth confirmed: Yes     Pain Level:   Pt denies pain    Patient Comments:   \"I can feel that. \" (Pressure at MCP heads)  \"When you get done that(swelling) will come back. \"  \"I do everything that she tells me to do. \"         Learning/Language barrier: no       HEP/Strategies/Orthosis Compliance: Patient verbally confirmed compliant with HEP's          Restrictions: N/A       Pt with noted decrease in open area on dorsum of right hand. OBJECTIVE EXAMINATION   Pt noted to be able to oppose thumb to 4th digit of right hand. Pt approximately 1/4\" from thumb to 5th digit.       TREATMENT     Focus of treatment was on the following:   decreasing fascial/soft tissue restrictions, edema management, and increase right hand and digits active and passive  ROM     Exercises:     Treatment Reasoning        AROM/PROM to all digits of right hand  Decreased ROM                     Manual Therapy: (CPT 55839) Treatment Reasoning      Pt provided with manual technique to retrograde edema in right hand and forearm. Pt provided with joint mobility to MCP's of right hand. Increase edema and decreased soft tissue mobility. Patient Education/HEP:   Continue recommended HEP/activities. , edema management:        ASSESSMENT       Assessment: Pt tolerated treatment well. Pt making good  progress towards goals. Post Treatment Pain: Pt denies pain    Patient's Activity Tolerance: Pt tolerated treatment well. Pt limited due to adhesions in soft tissue in right hand. GOALS     Long Term Goals  Time Frame for Long term goals : 16-24 visit(6-8 weeks)  Long Term Goal 1: Patient will report pain 2 or less during functional activities  Long Term Goal 2: Patient  will be indep with all recommended HEP's, adaptive strategies, and adaptive techniques. Long Term Goal 3: Patient will report decreased frequency of numbness/tingling in RUE. Long Term Goal 4: Patient will increase AROM of RUE wrist and hand to [place] [place] to increase performance with I/ADL's  Long Term Goal 5: Patient  will increase RUE strength from current to [place] [place] to increase performance with I/ADL's  Long Term Goal 6: Patient  will increase R  strength from current to 80% norm for age and gender to increase performance with I/ADL's. Long Term Goal 7: Patient  will increase R pinch strength from current to 80% norm for age and gender  to increase performance with I/ADLs  Long Term Goal 8: Patient  will increase dexterity in R hand as observed by 9 hole peg test by performing [place] [place] for age and gender to increase performance with I/ADL's. Long Term Goal 9: Patient  will decrease edema in RUE to increase ROM and performance with I/ADL's.   Long Term Goal 10: Pt. will utilize positioning and adaptive techniques to perform light weight work related activities indep. with RUE.                     TREATMENT PLAN   Continue POC           Electronically signed by YOBANI Harding/L  on 33/0/3156 at 9:15 AM

## 2022-10-04 ENCOUNTER — HOSPITAL ENCOUNTER (OUTPATIENT)
Dept: OCCUPATIONAL THERAPY | Age: 47
Setting detail: THERAPIES SERIES
Discharge: HOME | End: 2022-10-04
Payer: COMMERCIAL

## 2022-10-04 PROCEDURE — 97140 MANUAL THERAPY 1/> REGIONS: CPT

## 2022-10-04 PROCEDURE — 97110 THERAPEUTIC EXERCISES: CPT

## 2022-10-04 NOTE — PROGRESS NOTES
[facility]     Occupational Therapy: Daily Note   Patient: Therese Presley (15 y.o. male)   Date:   Plan of Care Certification Period:      :  1975  MRN: [id number]  CSN: [id number]   Insurance: Payor: Allenla Owesn / Plan: Pearla Owens / Product Type: *No Product type* /   Insurance ID: [id number] - (Medicaid Managed) Secondary Insurance (if applicable):    Referring Physician: Jovon Rosales MD     PCP: Manuel Fletcher MD Visits to Date: Total # of Visits to Date: 17   Progress note:Progress Note Counter: 16  Visits Approved: 28    No Show:    Cancelled Appts:      Medical Diagnosis: Unspecified fracture of fifth metacarpal bone, right hand, initial encounter for open fracture [S62.306B]  Displaced fracture of base of fourth metacarpal bone, right hand, initial encounter for open fracture [S62.314B] Displaced fx. D4 & D5 RUE        Therapy Time    Time in 0800   Time out 0900   Total treatment minutes 60   Total time code minutes           OT Manual therapy 25 minutes for 2 unit(s), CPT 37255  OT Therapeutic exercises 35 minutes for 2 unit(s), CPT 29885       SUBJECTIVE EXAMINATION     Patient's date of birth confirmed: Yes     Pain Level:   Pt denies pain    Patient Comments:   \"I got in touch with the doctor and I'm seeing him on Thursday so I cancelled this appointment because I can't do both. \"  \"I do everything she told me and then some. \"         Learning/Language barrier: no       HEP/Strategies/Orthosis Compliance: Patient verbally confirmed compliant with HEP's          Restrictions: N/A           OBJECTIVE EXAMINATION         TREATMENT     Focus of treatment was on the following:   decreasing fascial/soft tissue restrictions, edema management, and increase right hand active, active assist , and passive  ROM          Exercises:     Treatment Reasoning                               Manual Therapy: (CPT [id number]) Treatment Reasoning Patient Education/HEP:   Continue recommended HEP/activities. ASSESSMENT       Assessment: Pt tolerated treatment well. Post Treatment Pain: Pt denies pain    Patient's Activity Tolerance: Pt tolerated. Pt limited due to scar adhesions on 4th digit of right hand and edema in right hand. Pt wound remains open but exhibiting healing                 GOALS      GOALS     Long Term Goals  Time Frame for Long term goals : 16-24 visit(6-8 weeks)  Long Term Goal 1: Patient will report pain 2 or less during functional activities  Long Term Goal 2: Patient  will be indep with all recommended HEP's, adaptive strategies, and adaptive techniques. Long Term Goal 3: Patient will report decreased frequency of numbness/tingling in RUE. Long Term Goal 4: Patient will increase AROM of RUE wrist and hand to [place] [place] to increase performance with I/ADL's  Long Term Goal 5: Patient  will increase RUE strength from current to [place] [place] to increase performance with I/ADL's  Long Term Goal 6: Patient  will increase R  strength from current to 80% norm for age and gender to increase performance with I/ADL's. Long Term Goal 7: Patient  will increase R pinch strength from current to 80% norm for age and gender  to increase performance with I/ADLs  Long Term Goal 8: Patient  will increase dexterity in R hand as observed by 9 hole peg test by performing [place] [place] for age and gender to increase performance with I/ADL's. Long Term Goal 9: Patient  will decrease edema in RUE to increase ROM and performance with I/ADL's. Long Term Goal 10: Pt. will utilize positioning and adaptive techniques to perform light weight work related activities indep. with RUE.                    TREATMENT PLAN   Continue POC           Electronically signed by ANIL Le  on 64/6/6005 at 9:09 AM

## 2022-10-05 ENCOUNTER — TELEPHONE (OUTPATIENT)
Dept: ORTHOPEDIC SURGERY | Age: 47
End: 2022-10-05

## 2022-10-05 DIAGNOSIS — S62.306B OPEN DISPLACED FRACTURE OF FIFTH METACARPAL BONE OF RIGHT HAND, UNSPECIFIED PORTION OF METACARPAL, INITIAL ENCOUNTER: Primary | ICD-10-CM

## 2022-10-06 ENCOUNTER — OFFICE VISIT (OUTPATIENT)
Dept: ORTHOPEDIC SURGERY | Age: 47
End: 2022-10-06

## 2022-10-06 ENCOUNTER — HOSPITAL ENCOUNTER (OUTPATIENT)
Dept: OCCUPATIONAL THERAPY | Age: 47
Setting detail: THERAPIES SERIES
Discharge: HOME | End: 2022-10-06
Payer: COMMERCIAL

## 2022-10-06 VITALS — BODY MASS INDEX: 35 KG/M2 | HEIGHT: 71 IN | WEIGHT: 250 LBS

## 2022-10-06 DIAGNOSIS — S62.306B OPEN DISPLACED FRACTURE OF FIFTH METACARPAL BONE OF RIGHT HAND, UNSPECIFIED PORTION OF METACARPAL, INITIAL ENCOUNTER: Primary | ICD-10-CM

## 2022-10-06 DIAGNOSIS — S62.314B: ICD-10-CM

## 2022-10-06 DIAGNOSIS — S62.609B OPEN FRACTURE OF PHALANX OF DIGIT OF HAND, INITIAL ENCOUNTER: ICD-10-CM

## 2022-10-06 PROCEDURE — 99024 POSTOP FOLLOW-UP VISIT: CPT | Performed by: ORTHOPAEDIC SURGERY

## 2022-10-06 NOTE — PROGRESS NOTES
9 weeks out right hand crush injury status post debridement with ORIF. Overall he is pleased with his progress. He is attending therapy. Physical exam: Right hand with swelling and intrinsic minus positioning of the digits. He does have a scar contracture to the ring finger limiting extension at the PIP joint. No signs of infection. He does have good healing of the dorsal skin flap. Secondary healing present. No overt signs of infection. Composite flexion limited particular at the MCP joints of the index through little fingers. X-rays the office today: AP lateral obliques of his right hand were obtained in the office demonstrating healing and consolidating fractures of the fourth and fifth metacarpals as well as the little finger proximal phalanx. Interdigital space between the index and middle fingers is appropriate. Impression office x-rays: Healing fractures right hand    Assessment 9 weeks out improving right hand status post complex injury    Plan    Overall he is doing quite well. He is pleased with his progress. May continue to advance with therapy as tolerated. He has no restrictions in therapy. May progress with strengthening exercises modalities as needed. He does have good secondary healing of his dorsal webspace and continued local wound care was advised. He has developed a contracture to the ring finger secondary to scar. Discussed possible need for scar Z-plasty in the future. Follow-up in 2 months.

## 2022-10-06 NOTE — PROGRESS NOTES
Therapy                            Cancellation/No-show Note    Date: 10/6/2022  Patient Name: Randi Coffey    : 1975  (48 y.o.)     MRN: [id number]    Account #: [de-identified]       Canceled Appointment: 2    Comments: For today's appointment patient:  [x]  Cancelled  []  Rescheduled appointment  []  No-show   []  Called pt to remind of next appointment     Reason given by patient:  []  Patient ill  [x]  Conflicting appointment  []  No transportation    []  Conflict with work  []  No reason given  []  Other:      [x] Pt has future appointments scheduled, no follow up needed  [] Pt requests to be on hold. Reason:   If > 2 weeks please discuss with therapist.  [] Therapist to call pt for follow up     Signature:  ANIL Yates 10/6/2022 3:38 PM

## 2022-10-11 ENCOUNTER — HOSPITAL ENCOUNTER (OUTPATIENT)
Dept: OCCUPATIONAL THERAPY | Age: 47
Setting detail: THERAPIES SERIES
Discharge: HOME | End: 2022-10-11
Payer: COMMERCIAL

## 2022-10-11 PROCEDURE — 97140 MANUAL THERAPY 1/> REGIONS: CPT

## 2022-10-11 PROCEDURE — 97530 THERAPEUTIC ACTIVITIES: CPT

## 2022-10-11 PROCEDURE — 97760 ORTHOTIC MGMT&TRAING 1ST ENC: CPT

## 2022-10-11 NOTE — PROGRESS NOTES
[facility]     Occupational Therapy: Daily Note   Patient: Oswaldo Sahu (33 y.o. male)   Date: 221  Plan of Care Certification Period:  22   :  1975  MRN: [id number]  CSN: [id number]   Insurance: Payor: [facility]  [address] / Plan: [address] [place] [address] [address] / Product Type: *No Product type* /   Insurance ID: [id number] - (Medicaid Managed) Secondary Insurance (if applicable):    Referring Physician: Neymar Armas MD     PCP: Everardo Hurtado MD Visits to Date: Total # of Visits to Date: 18   Progress note:Progress Note Counter: 18  Visits Approved: 28    No Show:    Cancelled Appts:2     Medical Diagnosis: Unspecified fracture of fifth metacarpal bone, right hand, initial encounter for open fracture [S62.306B]  Displaced fracture of base of fourth metacarpal bone, right hand, initial encounter for open fracture [S62.314B] Displaced fx. D4 & D5 RUE        Therapy Time    Time in 0800   Time out 0900   Total treatment minutes 60   Total time code minutes  60 Minutes        OT Manual therapy 13 minutes for 1 unit(s), CPT 98667  OT Splint/orthosis fit and training 10 minutes for 1 unit(s), CPT 08763   OT Therapeutic activities 37 minutes for 2 unit(s), CPT 55285       SUBJECTIVE EXAMINATION     Patient's date of birth confirmed: Yes     Pain Level:   Pt denies pain    Patient Comments: \"The doctor was pleased and I now can touch each of my fingers with my thumb. \"    Learning/Language barrier: n/a     HEP/Strategies/Orthosis Compliance: Patient verbally confirmed compliant with HEP's Patient demo understanding. Patient with good. follow through      Restrictions: no lifting with R hand. OBJECTIVE EXAMINATION   RUE Thumb opposition:  to tip of each digit.        Average of  Three tries     RIGHT              CURRENT      Right                 (22)      Right              Norm      [place] lb. 14 2 103   PALMAR  Pinch  Lb. 4 NT 19 LATERAL  Pinch  Lb. 4 NT 20.5          TREATMENT     Focus of treatment was on the following:   incr AROM R hand, assess for progress toward goals, and decreasing fascial/soft tissue restrictions     MFR/Manual:   Deep scar massage base of ring finger with soft tissue mobilization of each digit, palm and wrist.    Exercises/Activities:  Re-measured RUE as above. Fitted pt. with compression glove for R hand with fingers fully exposed, utilized foam pad insert for increased compression. Pt. able to don/doff independently and instructed to wear 1-2 hours, 2-3 x /day. Measured R hand for pt. to obtain full compression glove with only fingertips exposed. per physician recommendation. Pt. to wear glove during ADL as much as tolerated. Pt. performed isolated digit extension with wrist neutral and palm flat on table, 5 reps each. MP flexion AAROM with forearm, wrist and palm supported on table, pt. would flex digits around edge of table with pressure applied using opposite hand, 10 reps. Fluidotherapy at 115° F for 15 minutes while completing AROM of R hand and digits, intermittently squeezing foam sponge. Patient Education/HEP:   Continue recommended HEP/activities. ASSESSMENT       Assessment: Pt tolerated treatment well. Pt reported no pain after OT treatment. Pt making good  progress towards goals. Attempted to fit pt. with reverse  R ring finger, however unable to fit with correct size. Pt. to use compression glove for edema control. Pt.'s  improved 12 lbs. and presently has a measurable pinch of 4 lbs. Good follow through with HEP noted.        Post Treatment Pain: Pt denies pain    Patient's Activity Tolerance: good                 GOALS         Long Term Goals  Time Frame for Long Term Goals : 16-24 visit(6-8 weeks)  Long Term Goal 1: Patient will report pain 2 or less during functional activities  Long Term Goal 2: Patient  will be indep with all recommended HEP's, adaptive strategies, and adaptive techniques. Long Term Goal 3: Patient will report decreased frequency of numbness/tingling in RUE. Long Term Goal 4: Patient will increase AROM of RUE wrist and hand to [facility] [place] to increase performance with I/ADL's  Long Term Goal 5: Patient  will increase RUE strength from current to [facility] [place] to increase performance with I/ADL's  Long Term Goal 6: Patient  will increase R  strength from current to 80% norm for age and gender to increase performance with I/ADL's. Long Term Goal 7: Patient  will increase R pinch strength from current to 80% norm for age and gender  to increase performance with I/ADLs  Long Term Goal 8: Patient  will increase dexterity in R hand as observed by 9 hole peg test by performing [facility] [place] for age and gender to increase performance with I/ADL's. Long Term Goal 9: Patient  will decrease edema in RUE to increase ROM and performance with I/ADL's. Long Term Goal 10: Pt. will utilize positioning and adaptive techniques to perform light weight work related activities indep. with RUE. TREATMENT PLAN   Continue POC. Pt. going out of state for vacation, thus scheduled only 1 x for 2 weeks. Will resume 2x week upon return.            Electronically signed by ANIL Villagran  on 10/11/2022 at 10:59 AM

## 2022-10-13 ENCOUNTER — HOSPITAL ENCOUNTER (OUTPATIENT)
Dept: OCCUPATIONAL THERAPY | Age: 47
Setting detail: THERAPIES SERIES
Discharge: HOME | End: 2022-10-13
Payer: COMMERCIAL

## 2022-10-13 NOTE — PROGRESS NOTES
Therapy                            Cancellation/No-show Note    Date: 10/13/2022  Patient Name: Nhan Arce    : 1975  (48 y.o.)     MRN: [id number]    Account #: [de-identified]       Canceled Appointment: 3    Comments: For today's appointment patient:  [x]  Cancelled  []  Rescheduled appointment  []  No-show   []  Called pt to remind of next appointment     Reason given by patient:  []  Patient ill  []  Conflicting appointment  []  No transportation    []  Conflict with work  []  No reason given  [x]  Other:  out of town    [x] Pt has future appointments scheduled, no follow up needed  [] Pt requests to be on hold. Reason:   If > 2 weeks please discuss with therapist.  [] Therapist to call pt for follow up     Signature:  YOBANI Brown/L 10/13/2022 7:44 AM

## 2022-10-18 ENCOUNTER — APPOINTMENT (OUTPATIENT)
Dept: OCCUPATIONAL THERAPY | Age: 47
End: 2022-10-18
Payer: COMMERCIAL

## 2022-10-25 ENCOUNTER — HOSPITAL ENCOUNTER (OUTPATIENT)
Dept: OCCUPATIONAL THERAPY | Age: 47
Setting detail: THERAPIES SERIES
Discharge: HOME | End: 2022-10-25
Payer: COMMERCIAL

## 2022-10-25 PROCEDURE — 97530 THERAPEUTIC ACTIVITIES: CPT

## 2022-10-25 NOTE — PROGRESS NOTES
OCCUPATIONAL THERAPY PLAN OF CARE     [facility]   [address]  Phone: [phone]    [] Certification     [] Recertification     [] Plan of Care    [x] Progress Note        Date: 10/25/2022    To:Referring Practitioner: Dr. Isaac Sessions          From: Dipak Jones OTR/L  Patient: Lorrie Rosales       : 1975  MRN: [id number]  Diagnosis:Diagnosis: Displaced fx. D4 & D5 RUE   Date of eval: 2022    Visit Information:   Onset Date: 22  OT Insurance Information: [employer] Medicaid  Total # of Visits Approved: 28  Total # of Visits to Date:   Certification Period Expiration Date: 22  No Show: 1  Progress Note Due Date: 22  Canceled Appointment: 3  Progress Note Counter: 23    Last POC date: 2022   Reporting period: -10/25/2022                             Assessment:    Goals Current/Discharge status  Met   Long Term Goal 1: Patient will report pain 2 or less during functional activities Pt. reports no pain. [x] Met  [] Partially Met  [] Not Met   Long Term Goal 2: Patient  will be indep with all recommended HEP's, adaptive strategies, and adaptive techniques. Fairly good follow through with all HEPs [] Met  [x] Partially Met  [] Not Met   Long Term Goal 3: Patient will report decreased frequency of numbness/tingling in RUE. Tingling remains in index digit [] Met  [x] Partially Met  [] Not Met   Long Term Goal 4: Patient will increase AROM of RUE wrist and hand to [facility] to increase performance with I/ADL's Wrist AROM WFL, hand continues to improve. See chart below. [] Met  [x] Partially Met  [] Not Met   Long Term Goal 5: Patient  will increase RUE strength from current to [facility] to increase performance with I/ADL's See chart below [] Met  [x] Partially Met  [] Not Met   Long Term Goal 6: Patient  will increase R  strength from current to 80% norm for age and gender to increase performance with I/ADL's.  See chart below [] Met  [x] Partially Met  [] Not Met   Long Term Goal 7: Patient  will increase R pinch strength from current to 80% norm for age and gender  to increase performance with I/ADLs   See chart below [] Met  [x] Partially Met  [] Not Met     Average of  Three tries     RIGHT              CURRENT      Right          last measure  (10/11/22)      Right              Norm      [place] lb. 21 14 103   PALMAR  Pinch  Lb. NT 4 19   LATERAL  Pinch  Lb. NT 4 20.5    NT due to equipment out for servicing. Overall strength of R shoulder 4+/5 and wrist are 4/5.             Right       MMT A Norm    Wrist         Flexion NT 70 0-70   Extension  NT 60 0-60   Ulnar deviation NT 30 0-30   Radial Deviation  NT 20 0-20                    Right   Normal    MP PIP DIP       0-90 0-100 0-70       A P A P A P   Index                 Extension   WFL  NT WFL NT WFL NT   Flexion   35 NT 77 NT 46 NT   Middle                 Extension   WFL NT -49 NT WFL NT   Flexion   30 NT 86 NT 63 NT   Ring                 Extension   WFL NT -54 NT WFL NT   Flexion    34 NT 86 NT 56 NT   Little                  Extension   WFL NT WFL NT WFL NT   flexion   52 NT 84 NT 69 NT        TREATMENT PLAN:  [] Evaluate & Treat [] Neuromuscular Re-education   [x] Re-evaluation [] Tissue (stress) Loading Program   [] Pain Management [x] PROM/Stretching/AAROM/AROM   [x] Edema Management [] Splinting   [x] Wound Care/Scar Management [] Desensitization   [x] ADL Training [x] Strengthening/Graded Therapeutic Activity   [x] Tendon Repair Program [] Coordination/Dexterity Training   [x] Instruction/Application of energy [x] Manual Techniques       conservation, work simplification [x] Instruction in HEP       joint protection, body mechanics [] Aquatic Therapy   [x] Modalities: [x] Ultrasound   [] Infrared [x] Electrical Stimulation [x] Fluidotherapy                          [] Hot/Cold Pack [] Paraffin    [] Other:                             Frequency:  2 days per week  Duration:  10 visits     Rehab Potential: [] Excellent [x] Good  [] Fair  [] Poor      Patient Status:    [x] Continue/Initate plan of Care                    []  Discharge OT         []  Additional visits requested, please re-certify for additional visits        Electronically signed by: ANIL Castillo 10/25/2022 11:30 AM    If you have any questions or concerns, please don't hesitate to call. Thank you for your referral.      I have reviewed this plan of care and certify a need for medically necessary rehabilitation services.     Physician Signature:__________________________________________________________    Date: 10/25/2022  Please sign and return

## 2022-10-25 NOTE — PROGRESS NOTES
[facility] OCCUPATIONAL THERAPY     Occupational Therapy: Daily Note   Patient: María Rubin (45 y.o. male)   Date:   Plan of Care Certification Period:  22   :  1975  MRN: [id number]  CSN: [id number]   Insurance: Payor: [facility]  [address] / Plan: [facility] [employer] [address] / Product Type: *No Product type* /   Insurance ID: [id number] - (Medicaid Managed) Secondary Insurance (if applicable):    Referring Physician: Gypsy Bobby MD     PCP: Rosey Hernandez MD Visits to Date: Total # of Visits to Date: 18   Progress note:Progress Note Counter: 23  Visits Approved: 28    No Show: 1  Cancelled Appts:3     Medical Diagnosis: Unspecified fracture of fifth metacarpal bone, right hand, initial encounter for open fracture [S62.306B]  Displaced fracture of base of fourth metacarpal bone, right hand, initial encounter for open fracture [S62.314B] Displaced fx. D4 & D5 RUE        Therapy Time    Time in 0800   Time out 0912   Total treatment minutes 72   Total time code minutes  70 Minutes        OT Therapeutic activities 70 minutes for 5 unit(s), CPT 27967       SUBJECTIVE EXAMINATION     Patient's date of birth confirmed: Yes     Pain Level:   Pt denies pain    Patient Comments: \"The compression garment is helping my hand. \"    Learning/Language barrier: no     HEP/Strategies/Orthosis Compliance: Patient verbally confirmed compliant with HEP's Patient demo understanding. Restrictions: none         OBJECTIVE EXAMINATION      Average of  Three tries     RIGHT              CURRENT      Right          last measure  (10/11/22)      Right              Norm      [place] lb. 21 14 103   PALMAR  Pinch  Lb. NT 4 19   LATERAL  Pinch  Lb. NT 4 20.5    NT due to equipment out for servicing.     Right       MMT A Norm    Wrist         Flexion NT 70 0-70   Extension  NT 60 0-60   Ulnar deviation NT 30 0-30   Radial Deviation  NT 20 0-20        Right   Normal    MP PIP DIP       0-90 0-100 0-70       A P A P A P   Index                 Extension   WFL  NT WFL NT WFL NT   Flexion   35 NT 77 NT 46 NT   Middle                 Extension   WFL NT -52 NT WFL NT   Flexion   30 NT 86 NT 63 NT   Ring                 Extension   WFL NT -47 NT WFL NT   Flexion    34 NT 86 NT 56 NT   Little                  Extension   WFL NT WFL NT WFL NT   flexion  52 NT 84 NT 69 NT       TREATMENT     Focus of treatment was on the following:   increasing functional use of RUE and assess for progress toward goals     Re-measured RUE as above. Exercises/Activities for RUE ROM and strengthening:  Brief scar mobilization performed dorsal hand and ring finger using lotion to improve mobility. UBE at level 3 x 4 min using RUE only ( 2 min forward/2 min reverse)  Isolated finger AROM, finger walking to scrunch up washcloth into hand and flatten back out onto table surface, 5 reps  Repetitive /pinch strengthening pulling off pieces of yellow and green putty to fill 2 oz putty cups, 8 cups  Finger strengthening pressing lids onto containers w/ R hand only, 4 reps  Grasping and picking up various sized round objects w/ RUE only, 8 trials. Tip to tip pinch attempting to  1\"-2\" cubes, 3 reps each digit. Ice glove place over hand for 2 min at end of session, applied compression glove. Fluidotherapy at 115° F for 20 minutes while completing AROM of R wrist and hand, squeezing a sponge intermittently and performing wrist figure 8s and circumduction. Patient Education/HEP:   Continue recommended HEP/activities. Pt. encouraged to purchase       ASSESSMENT       Assessment: Pt making good  progress towards goals. Pt. has significant scarring at base of R ring finger, significantly limiting AROM of digit. Dorsal wound is closed,well healed with light scabbing present. No redness or purulent fluid noted. Pt. has progressed with ROM and  in RUE. Good follow through with HEPs noted.   Rue fatigued and shakiness noted end of session. Post Treatment Pain: Pt denies pain    Patient's Activity Tolerance: good                 GOALS         Long Term Goals  Time Frame for Long Term Goals : 16-24 visit(6-8 weeks)  Long Term Goal 1: Patient will report pain 2 or less during functional activities  Long Term Goal 2: Patient  will be indep with all recommended HEP's, adaptive strategies, and adaptive techniques. Long Term Goal 3: Patient will report decreased frequency of numbness/tingling in RUE. Long Term Goal 4: Patient will increase AROM of RUE wrist and hand to [facility] [place] to increase performance with I/ADL's  Long Term Goal 5: Patient  will increase RUE strength from current to [facility] [place] to increase performance with I/ADL's  Long Term Goal 6: Patient  will increase R  strength from current to 80% norm for age and gender to increase performance with I/ADL's. Long Term Goal 7: Patient  will increase R pinch strength from current to 80% norm for age and gender  to increase performance with I/ADLs  Long Term Goal 8: Patient  will increase dexterity in R hand as observed by 9 hole peg test by performing [facility] [place] for age and gender to increase performance with I/ADL's. Long Term Goal 9: Patient  will decrease edema in RUE to increase ROM and performance with I/ADL's. Long Term Goal 10: Pt. will utilize positioning and adaptive techniques to perform light weight work related activities indep. with RUE. TREATMENT PLAN   Increase motion at MP joints for pt. to obtain full composite fist , monitor compression glove and increase functional use of RUE.            Electronically signed by ANIL Parker  on 10/25/2022 at 9:49 AM

## 2022-10-27 ENCOUNTER — HOSPITAL ENCOUNTER (OUTPATIENT)
Dept: OCCUPATIONAL THERAPY | Age: 47
Setting detail: THERAPIES SERIES
Discharge: HOME | End: 2022-10-27
Payer: COMMERCIAL

## 2022-10-27 PROCEDURE — 97140 MANUAL THERAPY 1/> REGIONS: CPT

## 2022-10-27 PROCEDURE — 97530 THERAPEUTIC ACTIVITIES: CPT

## 2022-10-27 NOTE — PROGRESS NOTES
[facility] OCCUPATIONAL THERAPY     Occupational Therapy: Daily Note   Patient: Maral Byrd (71 y.o. male)   Date:   Plan of Care Certification Period:  22   :  1975  MRN: [id number]  CSN: [id number]   Insurance: Payor: [address] [place]  [address] / Plan: [address] [place] [address] [address] / Product Type: *No Product type* /   Insurance ID: [id number] - (Medicaid Managed) Secondary Insurance (if applicable):    Referring Physician: Lidia Villafana MD     PCP: Georgia Boland MD Visits to Date: Total # of Visits to Date: 20   Progress note:Progress Note Counter: 20  Visits Approved: 28    No Show: 1  Cancelled Appts:      Medical Diagnosis: Unspecified fracture of fifth metacarpal bone, right hand, initial encounter for open fracture [S62.306B]  Displaced fracture of base of fourth metacarpal bone, right hand, initial encounter for open fracture [S62.314B] Displaced fx. D4 & D5 RUE        Therapy Time    Time in 901   Time out 1000   Total treatment minutes  59 min   Total time code minutes  59 Minutes        OT Manual therapy 15 minutes for 1 unit(s), CPT 87490  OT Therapeutic activities 44 minutes for 3 unit(s), CPT 61860       SUBJECTIVE EXAMINATION     Patient's date of birth confirmed: Yes     Pain Level:   Pt denies pain    Patient Comments:   \"I am trying to do more and more with my hand every day. \"    Learning/Language barrier: yes,      HEP/Strategies/Orthosis Compliance: Patient verbally confirmed compliant with HEP's   Pt. also reports he has ordered a compression glove with fingers. Restrictions: none      OBJECTIVE EXAMINATION       TREATMENT     Focus of treatment was on the following:   increasing ROM and function of R hand. MFR/Manual:   Debrided edge of wound to alleviate thickened scar tissue around perimeter of R dorsal hand scar. Deep tissue mobilization of palm and digits performed, followed by joint mob of all carpals and metacarpal joints. Exercises/Activities to improve strength and ROM of R hand:  Using textured bar, pt. rolled R hand from base of palm to fingertips with exaggerated digit extension at end range, 2 x 10 reps  Yellow power web,  and pull with fingertips, 2 x 10 and press w/ flat palm, 2 x 10  10 lb. flex bar, grasp and flex, 2 x 10 and grasp/flex with forearm supinated, 2 x 10  Isolated digit extension each digit x 10 reps while maintaining palm flat on table surface. Holding 1 kg. plyoball, wrist flex/ext, 2 x 10 reps  Thumb opposition to base of 5th digit AAROM, 5 reps  Resistive digit extension, each digit 5 reps. Patient Education/HEP:   Continue recommended HEP/activities. ASSESSMENT       Assessment: Pt tolerated treatment well. Pt. progressing toward goals. Pt. was able to maintain digits flexed around 1.5\" diameter bar. Wound healing and sloughing off old scab. Pt. has met goal 1,2 and 3. Pt. demo good follow through w/ HEP. Pt. was able to oppose to base of D5 following therapy. Continues to utilize compression garments for RUE. Post Treatment Pain: Pt denies pain    Patient's Activity Tolerance: good                 GOALS         Long Term Goals  Time Frame for Long Term Goals : 16-24 visit(6-8 weeks)  Long Term Goal 1: Patient will report pain 2 or less during functional activities  Long Term Goal 2: Patient  will be indep with all recommended HEP's, adaptive strategies, and adaptive techniques. Long Term Goal 3: Patient will report decreased frequency of numbness/tingling in RUE. Long Term Goal 4: Patient will increase AROM of RUE wrist and hand to [facility] to increase performance with I/ADL's  Long Term Goal 5: Patient  will increase RUE strength from current to [facility] to increase performance with I/ADL's  Long Term Goal 6: Patient  will increase R  strength from current to 80% norm for age and gender to increase performance with I/ADL's.   Long Term Goal 7: Patient  will increase R pinch strength from current to 80% norm for age and gender  to increase performance with I/ADLs  Long Term Goal 8: Patient  will increase dexterity in R hand as observed by 9 hole peg test [facility] and [facility] to increase performance with I/ADL's. Long Term Goal 9: Patient  will decrease edema in RUE to increase ROM and performance with I/ADL's. Long Term Goal 10: Pt. will utilize positioning and adaptive techniques to perform light weight work related activities indep. with RUE. TREATMENT PLAN   Plan to see 2 x week for improving ROM and functional use of RUE for adl.            Electronically signed by ANIL Davison  on 10/27/2022 at 10:25 AM

## 2022-11-01 ENCOUNTER — HOSPITAL ENCOUNTER (OUTPATIENT)
Dept: OCCUPATIONAL THERAPY | Age: 47
Setting detail: THERAPIES SERIES
Discharge: HOME | End: 2022-11-01
Payer: COMMERCIAL

## 2022-11-01 PROCEDURE — 97530 THERAPEUTIC ACTIVITIES: CPT

## 2022-11-01 PROCEDURE — 97140 MANUAL THERAPY 1/> REGIONS: CPT

## 2022-11-01 NOTE — PROGRESS NOTES
[facility]     Occupational Therapy: Daily Note   Patient: Adam Mix (64 y.o. male)   Date:   Plan of Care Certification Period:  22   :  1975  MRN: [id number]  CSN: [id number]   Insurance: Payor: [facility]  [address] / Plan: [address] [place] [address] [address] / Product Type: *No Product type* /   Insurance ID: [id number] - (Medicaid Managed) Secondary Insurance (if applicable):    Referring Physician: Bryant Santiago MD     PCP: Chevy Cline MD Visits to Date: Total # of Visits to Date: 21   Progress note:Progress Note Counter: 21  Visits Approved: 28    No Show: 2  Cancelled Appts:      Medical Diagnosis: Unspecified fracture of fifth metacarpal bone, right hand, initial encounter for open fracture [S62.306B]  Displaced fracture of base of fourth metacarpal bone, right hand, initial encounter for open fracture [S62.314B] Displaced fx. D4 & D5 RUE        Therapy Time    Time in 0900   Time out 1000   Total treatment minutes 60   Total time code minutes  60 Minutes        OT Manual therapy 18 minutes for 1 unit(s), CPT 27635  OT Therapeutic activities 42 minutes for 3 unit(s), CPT 07135       SUBJECTIVE EXAMINATION     Patient's date of birth confirmed: Yes     Pain Level:   Pt denies pain    Patient Comments:   \"I am getting my ring finger open better and I have a little tingling along my scar by my web space. \"    Learning/Language barrier: n/a     HEP/Strategies/Orthosis Compliance: Patient verbally confirmed compliant with HEP's      Restrictions: none         OBJECTIVE EXAMINATION     TREATMENT     Focus of treatment was on the following:   ROM and strengthening R hand, decreasing scar tissue. MFR/Manual:   Provided cupping technique of scar dorsal surface of hand, from ulnar side of hand to thenar web space. Followed with soft tissue mobilization with deep pressure and retrograde massage across scar.   Also performed scar mobilization of volar ring finger while finger on stretch in extension. Sensory stimulation using small vibrator over all surfaces of hand provided to decrease numbness and tingling in hand. Exercises/Activities RUE:  Gross grasp of rolled towel, maintaining grasp while resistance provided pulling towel in all directions, 8-10 reps  Medium resistance light green theraband strengthening ex for straight arm raises, seated rows and horizontal ab/adduction, 10 reps each  Fine motor prehension skills using standard clothespins for pinch tip to tip each digit, placing clips on lip of container x 13 clips  Isolated active digit extension while extended arm weight bearing with rolled towel in palm, 5 reps each digit. Instructed in and performed standing weight bearing with R hand on small ball in palm on table for HEP   Fluidotherapy at 115° F for 20 minutes while completing AROM grasping foam sponge and printing alphabet in air and making figure 8 motions to lengthen tissue of R hand. Patient Education/HEP:    Weight bearing on small ball HEP, pt. performed 10 reps and demo good understanding. ASSESSMENT       Assessment: Pt making good  progress towards goals. Pt. is able to oppose to tip of each digit and base of 5th digit. Pt. continues to have adhesions in scar, however scar completely healed and scab is dry and flaking. Mild edema persists throughout R hand, however is soft and pliable. Pt. responded well to cupping with improved skin mobility noted. Pt. demonstrates fairly good functional grasp with significant weakness and fatigue RUE.        Post Treatment Pain: Pt denies pain    Patient's Activity Tolerance: good                 GOALS         Long Term Goals  Time Frame for Long Term Goals : 16-24 visit(6-8 weeks)  Long Term Goal 1: Patient will report pain 2 or less during functional activitiesGoal met  Long Term Goal 2: Patient  will be indep with all recommended HEP's, adaptive strategies, and adaptive techniques. Goal met  Long Term Goal 3: Patient will report decreased frequency of numbness/tingling in RUE. Long Term Goal 4: Patient will increase AROM of RUE wrist and hand to [facility] [place] to increase performance with I/ADL's  Long Term Goal 5: Patient  will increase RUE strength from current to [facility] to increase performance with I/ADL's  Long Term Goal 6: Patient  will increase R  strength from current to 80% norm for age and gender to increase performance with I/ADL's. Long Term Goal 7: Patient  will increase R pinch strength from current to 80% norm for age and gender  to increase performance with I/ADLs  Long Term Goal 8: Patient  will increase dexterity in R hand as observed by 9 hole peg test by performing [facility] [place] for age and gender to increase performance with I/ADL's. Long Term Goal 9: Patient  will decrease edema in RUE to increase ROM and performance with I/ADL's. Long Term Goal 10: Pt. will utilize positioning and adaptive techniques to perform light weight work related activities indep. with RUE. TREATMENT PLAN   2 x week for increasing functional use of RUE, improve fine motor skills for ADL.            Electronically signed by ANIL Perez  on 11/1/2022 at 11:07 AM

## 2022-11-03 ENCOUNTER — HOSPITAL ENCOUNTER (OUTPATIENT)
Dept: OCCUPATIONAL THERAPY | Age: 47
Setting detail: THERAPIES SERIES
Discharge: HOME | End: 2022-11-03
Payer: COMMERCIAL

## 2022-11-03 PROCEDURE — 97530 THERAPEUTIC ACTIVITIES: CPT

## 2022-11-03 PROCEDURE — 97140 MANUAL THERAPY 1/> REGIONS: CPT

## 2022-11-03 NOTE — PROGRESS NOTES
[facility]     Occupational Therapy: Daily Note   Patient: Maral Byrd (79 y.o. male)   Date:   Plan of Care Certification Period:  22   :  1975  MRN: [id number]  CSN: [id number]   Insurance: Payor: [address] [place] [address]  [address] / Plan: [address] [place] [address] [address] / Product Type: *No Product type* /   Insurance ID: [id number] - (Medicaid Managed) Secondary Insurance (if applicable):    Referring Physician: Lidia Villafana MD     PCP: Georgia Boland MD Visits to Date: Total # of Visits to Date: 22   Progress note:Progress Note Counter: 22  Visits Approved: 28    No Show: 2  Cancelled Appts:      Medical Diagnosis: Unspecified fracture of fifth metacarpal bone, right hand, initial encounter for open fracture [S62.306B]  Displaced fracture of base of fourth metacarpal bone, right hand, initial encounter for open fracture [S62.314B] Displaced fx. D4 & D5 RUE        Therapy Time    Time in 0900   Time out 1000   Total treatment minutes 60   Total time code minutes  60 Minutes        OT Manual therapy 16 minutes for 1 unit(s), CPT 14230  OT Therapeutic activities 44 minutes for 3 unit(s), CPT 47796       SUBJECTIVE EXAMINATION     Patient's date of birth confirmed: Yes     Pain Level:   Pt denies pain    Patient Comments:   \"I have no pain. I think that cupping really helped, look how much the swelling is gone from around that scar. \"    Learning/Language barrier: no     HEP/Strategies/Orthosis Compliance: Patient verbally confirmed compliant with HEP's Patient demo understanding. Restrictions: none        OBJECTIVE EXAMINATION     TREATMENT     Focus of treatment was on the following:   increasing AROM R hand and decreasing scar tissue adhesions. MFR/Manual:   Completed cupping with small cup extractor around and over entire scar dorsal R hand and along medial side of index digit with good results.   Joint mob. of all MP joints tolerated with discomfort noted in middle and ring digit. Exercises/Activities RUE:  Wrist PREs w/ 2 lb. weight for 15 reps flex/ext. Using 4 lb. weight, performed overhead raises with tricep/bicep curls, 2 x 15  All digit extension AROM completed against mod resistance hand Xtrainer, 15 reps  Vibration all aspects of hand tolerated to improve sensory skills. Isolated tip to tip pinch placing 1&2 lb. resistance clips on vertical pole. 3 pt. prehension utilized to place 6 lb. clips on horizontal bar, 4 clips only  Isolated index and middle extension performed by depressing both red and green clips to max potential with extended digit. Fluidotherapy at 115° F for 15 minutes while completing AROM squeezing foam ball intermittently, then writing alphabet in the air with wrist motions. Patient Education/HEP:    Self PROM of MP joints R hand using edge of table to drape fingers over to increase ROM. Completed 5 reps to demonstrate good understanding and positioning. ASSESSMENT       Assessment: Pt making good  progress towards goals. Pt. was able to  a coin from table surface using thumb and each digit tip. Gross fist obtained with decreased MP flexion all joints, MP flexion 80* 5th digit. Pt. demo good understanding of HEP. Post Treatment Pain: Pt denies pain    Patient's Activity Tolerance: good              GOALS         Long Term Goals  Time Frame for Long Term Goals : 16-24 visit(6-8 weeks)  Long Term Goal 1: Patient will report pain 2 or less during functional activities  Long Term Goal 2: Patient  will be indep with all recommended HEP's, adaptive strategies, and adaptive techniques. Long Term Goal 3: Patient will report decreased frequency of numbness/tingling in RUE.   Long Term Goal 4: Patient will increase AROM of RUE wrist and hand to [facility] to increase performance with I/ADL's  Long Term Goal 5: Patient  will increase RUE strength from current to [facility] to increase performance with I/ADL's  Long Term Goal 6: Patient  will increase R  strength from current to 80% norm for age and gender to increase performance with I/ADL's. Long Term Goal 7: Patient  will increase R pinch strength from current to 80% norm for age and gender  to increase performance with I/ADLs  Long Term Goal 8: Patient  will increase dexterity in R hand as observed by 9 hole peg test [facility] and [facility] to increase performance with I/ADL's. Long Term Goal 9: Patient  will decrease edema in RUE to increase ROM and performance with I/ADL's. Long Term Goal 10: Pt. will utilize positioning and adaptive techniques to perform light weight work related activities indep. with RUE. TREATMENT PLAN   2 x week for strengthening and AROM RUE to improve functional use of RUE.            Electronically signed by ANIL Upton  on 11/3/2022 at 10:04 AM

## 2022-11-08 ENCOUNTER — HOSPITAL ENCOUNTER (OUTPATIENT)
Dept: OCCUPATIONAL THERAPY | Age: 47
Setting detail: THERAPIES SERIES
Discharge: HOME | End: 2022-11-08
Payer: COMMERCIAL

## 2022-11-08 PROCEDURE — 97530 THERAPEUTIC ACTIVITIES: CPT

## 2022-11-08 PROCEDURE — 97140 MANUAL THERAPY 1/> REGIONS: CPT

## 2022-11-08 NOTE — PROGRESS NOTES
[facility]     Occupational Therapy: Daily Note   Patient: Mervat Foreman (14 y.o. male)   Date:   Plan of Care Certification Period:  22   :  1975  MRN: [id number]  CSN: [id number]   Insurance: Payor: [facility]  [address] / Plan: [facility] [address] / Product Type: *No Product type* /   Insurance ID: [id number] - (Medicaid Managed) Secondary Insurance (if applicable):    Referring Physician: Tang Wilson MD     PCP: Carmen Keita MD Visits to Date: Total # of Visits to Date: 23   Progress note:Progress Note Counter: 23  Visits Approved: 28    No Show: 2  Cancelled Appts:      Medical Diagnosis: Unspecified fracture of fifth metacarpal bone, right hand, initial encounter for open fracture [S62.306B]  Displaced fracture of base of fourth metacarpal bone, right hand, initial encounter for open fracture [S62.314B] Displaced fx. D4 & D5 RUE        Therapy Time     Time in 0900   Time out 1000   Total treatment minutes 60   Total time code minutes  60 Minutes        OT Manual therapy 14 minutes for 1 unit(s), CPT 58589  OT Therapeutic activities 46 minutes for 3 unit(s), CPT 36608       SUBJECTIVE EXAMINATION     Patient's date of birth confirmed: Yes     Pain Level:   Pt denies pain    Patient Comments:   \"I pulled some of my scab off today and this little area is open. \"    Learning/Language barrier: no     HEP/Strategies/Orthosis Compliance: Patient verbally confirmed compliant with HEP's Patient demo understanding. Restrictions: no heavy lifting    OBJECTIVE EXAMINATION       TREATMENT     Focus of treatment was on the following:   increasing AROM and functional use of R hand      MFR/Manual:   Joint mob and mod distraction completed at MP joint of index and middle digit R hand, followed with AAROM to composite fist all digits.   Cupping completed over all scar sites and palm of hand to improve mobility and decrease edema. Exercises/Activities RUE for strengthening and ROM:  Blue moderate resistance power web: grasp and pull x 10 reps             push into web w/ fisted hand, x 10          push into web with open hand, x 10  Grasp and throw 4\" wide foam ball, 10-20 feet x 10  Grasp and throuw 4\" wide firm ball, 10-20 feet x 5  Gripping 1.5 lb. hammer, perform sup/pronation x 15 reps  Wrist flex/ext to roll up dowel bar w/ 2 lb. weight suspended on bar, 15 reps  UBE at level 4 w/ RUE only, 2 min forward, 2 min reverse  RUE pull down tricep extensions on Apollo machine w/ 30 lbs. , 10 reps    Fluidotherapy at 115° F for 20 minutes while completing AROM repetitively gripping foam ball with RUE until fatigued. Patient Education/HEP:   Continue recommended HEP/activities. ASSESSMENT       Assessment: Pt tolerated treatment well. Improved mobility and circulation noted with cupping this date. Pt. continues to have limited flexion MP joints index and middle digit, partially due to scar tissue adhesion at base of middle and ring finger. Pt. able to grasp fairly well with R hand, unable to button or tie at this time. Pt. reports fatigue in RUE only. Post Treatment Pain: Pt denies pain    Patient's Activity Tolerance: good                 GOALS         Long Term Goals  Time Frame for Long Term Goals : 16-24 visit(6-8 weeks)  Long Term Goal 1: Patient will report pain 2 or less during functional activities  Long Term Goal 2: Patient  will be indep with all recommended HEP's, adaptive strategies, and adaptive techniques. Long Term Goal 3: Patient will report decreased frequency of numbness/tingling in RUE.   Long Term Goal 4: Patient will increase AROM of RUE wrist and hand to [facility] to increase performance with I/ADL's  Long Term Goal 5: Patient  will increase RUE strength from current to [facility] to increase performance with I/ADL's  Long Term Goal 6: Patient  will increase R  strength from current to 80% norm for age and gender to increase performance with I/ADL's. Long Term Goal 7: Patient  will increase R pinch strength from current to 80% norm for age and gender  to increase performance with I/ADLs  Long Term Goal 8: Patient  will increase dexterity in R hand as observed by 9 hole peg test by performing [facility] [place] for age and gender to increase performance with I/ADL's. Long Term Goal 9: Patient  will decrease edema in RUE to increase ROM and performance with I/ADL's. Long Term Goal 10: Pt. will utilize positioning and adaptive techniques to perform light weight work related activities indep. with RUE. TREATMENT PLAN   2 x week for improving grasp and fine motor control of R hand to be able to button and tie using R hand.            Electronically signed by Danny Paget, OTR/L  on 11/8/2022 at 10:16 AM

## 2022-11-10 ENCOUNTER — HOSPITAL ENCOUNTER (OUTPATIENT)
Dept: OCCUPATIONAL THERAPY | Age: 47
Setting detail: THERAPIES SERIES
Discharge: HOME | End: 2022-11-10
Payer: COMMERCIAL

## 2022-11-10 PROCEDURE — 97140 MANUAL THERAPY 1/> REGIONS: CPT

## 2022-11-10 PROCEDURE — 97035 APP MDLTY 1+ULTRASOUND EA 15: CPT

## 2022-11-10 PROCEDURE — 97530 THERAPEUTIC ACTIVITIES: CPT

## 2022-11-10 NOTE — PROGRESS NOTES
[facility]     Occupational Therapy: Daily Note   Patient: Salina Mahan (66 y.o. male)   Date:   Plan of Care Certification Period:  22   :  1975  MRN: [id number]  CSN: [id number]   Insurance: Payor: [facility]  [address] / Plan: [facility] [facility] [address] / Product Type: *No Product type* /   Insurance ID: [id number] - (Medicaid Managed) Secondary Insurance (if applicable):    Referring Physician: Kamari Baker MD     PCP: Aliyah Martins MD Visits to Date: Total # of Visits to Date: 24   Progress note:Progress Note Counter: 24  Visits Approved: 28    No Show: 2  Cancelled Appts:      Medical Diagnosis: Unspecified fracture of fifth metacarpal bone, right hand, initial encounter for open fracture [S62.306B]  Displaced fracture of base of fourth metacarpal bone, right hand, initial encounter for open fracture [S62.314B] Displaced fx. D4 & D5 RUE        Therapy Time     Time in 0900   Time out 1000   Total treatment minutes 60   Total time code minutes  60 Minutes        OT Manual therapy 10 minutes for 1 unit(s), CPT 15549  OT Therapeutic activities 42 minutes for 2 unit(s), CPT 05945  OT Ultrasound 8 minutes for 1 unit(s), CPT 03303       SUBJECTIVE EXAMINATION     Patient's date of birth confirmed: Yes     Pain Level:   Pt denies pain    Patient Comments:   \"I can't do power tools or a screwdriver with my R hand. \"    Learning/Language barrier: yes     HEP/Strategies/Orthosis Compliance: Patient verbally confirmed compliant with HEP's     Restrictions: none        OBJECTIVE EXAMINATION      Average of  Three tries     RIGHT              CURRENT      Right             (10/11/22)          Right              Norm      [place] lb. 23 14 103   PALMAR  Pinch  Lb. 9 4 19   LATERAL  Pinch  Lb. 7 4 20.5          TREATMENT     Focus of treatment was on the following:   strengthening R wrist and hand      MFR/Manual:   After U/S, performed aggressive, deep palmar and scar mobilization with milking of entire hand and forearm to alleviate edema in hand and wrist RUE. Measured /pinch as above. Exercises/Activities for strengthening of RUE:  Gross grasp/release grasping rolled towel x 10 reps  Repetitive  with mod resistance power web(red) x 15 reps  15 lb resistance flex bar, grasp and flex x 15 each forearm pronated and forearm supinated position  25 lb. resistance flex bar, grasp and flex x 10 with forearm pronated. Weight bearing on tennis ball in R palm, performing self mobilization of palm x 20 reps CW/CCW. Forearm sup/pronation w/ 1.5 lb. weighted hammer, 15 reps  Tip to tip pinch using standard clothespins, 3 reps each digit. Thermal ultrasound intensity 1 MHz at 1.5 [place]/cm2 pulsed 50% over palmar scar near base of middle and ring digit to decrease pain and soft tissue restrictions. Patient Education/HEP:   Continue recommended HEP/activities. ASSESSMENT       Assessment:  Pt. progressing well toward goals. Pt.  and pinch improved from previous measurements. Pt. able to utilize tip to tip pinch all digits with decreased control ring finger. Scar tissue at  palmar base of ring finger limits motion and flexibility. Pt.'s RUE fatigued easily with tasks this date and pt. reported \"feeling achy\" in wrist and forearm. Post Treatment Pain: Pt denies pain    Patient's Activity Tolerance: good                 GOALS         Long Term Goals  Time Frame for Long Term Goals : 16-24 visit(6-8 weeks)  Long Term Goal 1: Patient will report pain 2 or less during functional activities  Long Term Goal 2: Patient  will be indep with all recommended HEP's, adaptive strategies, and adaptive techniques. Long Term Goal 3: Patient will report decreased frequency of numbness/tingling in RUE.   Long Term Goal 4: Patient will increase AROM of RUE wrist and hand to [facility] to increase performance with I/ADL's  Long Term Goal 5: Patient will increase RUE strength from current to [facility] [place] to increase performance with I/ADL's  Long Term Goal 6: Patient  will increase R  strength from current to 80% norm for age and gender to increase performance with I/ADL's. Long Term Goal 7: Patient  will increase R pinch strength from current to 80% norm for age and gender  to increase performance with I/ADLs  Long Term Goal 8: Patient  will increase dexterity in R hand as observed by 9 hole peg test by performing [facility] [place] for age and gender to increase performance with I/ADL's. Long Term Goal 9: Patient  will decrease edema in RUE to increase ROM and performance with I/ADL's. Long Term Goal 10: Pt. will utilize positioning and adaptive techniques to perform light weight work related activities indep. with RUE. TREATMENT PLAN   2 x week for strengthening and work conditioning of RUE to perform all aspects of his job without pain.            Electronically signed by ANIL Serrano  on 11/10/2022 at 10:26 AM

## 2022-11-15 ENCOUNTER — HOSPITAL ENCOUNTER (OUTPATIENT)
Dept: OCCUPATIONAL THERAPY | Age: 47
Setting detail: THERAPIES SERIES
Discharge: HOME | End: 2022-11-15
Payer: COMMERCIAL

## 2022-11-15 DIAGNOSIS — S62.306B OPEN DISPLACED FRACTURE OF FIFTH METACARPAL BONE OF RIGHT HAND, UNSPECIFIED PORTION OF METACARPAL, INITIAL ENCOUNTER: Primary | ICD-10-CM

## 2022-11-15 DIAGNOSIS — S62.314B: ICD-10-CM

## 2022-11-15 DIAGNOSIS — S62.609B OPEN FRACTURE OF PHALANX OF DIGIT OF HAND, INITIAL ENCOUNTER: ICD-10-CM

## 2022-11-15 PROCEDURE — 97035 APP MDLTY 1+ULTRASOUND EA 15: CPT

## 2022-11-15 PROCEDURE — 97530 THERAPEUTIC ACTIVITIES: CPT

## 2022-11-15 NOTE — PROGRESS NOTES
[facility]     Occupational Therapy: Daily Note   Patient: Rachel Miranda (24 y.o. male)   Date:   Plan of Care Certification Period:  22   :  1975  MRN: [id number]  CSN: [id number]   Insurance: Payor: [address] [place] [address]  [address] / Plan: [address] [place] [address] [address] / Product Type: *No Product type* /   Insurance ID: [id number] - (Medicaid Managed) Secondary Insurance (if applicable):    Referring Physician: Ranjeet Washburn MD     PCP: Louann Adames MD Visits to Date: Total # of Visits to Date: 25   Progress note:Progress Note Counter: 25  Visits Approved: 28    No Show: 2  Cancelled Appts:      Medical Diagnosis: Unspecified fracture of fifth metacarpal bone, right hand, initial encounter for open fracture [S62.306B]  Displaced fracture of base of fourth metacarpal bone, right hand, initial encounter for open fracture [S62.314B] Displaced fx. D4 & D5 RUE        Therapy Time     Time in 0900   Time out 1000   Total treatment minutes 60   Total time code minutes  60 Minutes        OT Therapeutic activities 52 minutes for 3 unit(s), CPT 28775  OT Ultrasound 8 minutes for 1 unit(s), CPT 53998       SUBJECTIVE EXAMINATION     Patient's date of birth confirmed: Yes     Pain Level:   Pt denies pain    Patient Comments:   \"I am exercising this hand every day, but my wound is almost gone and the scar is thinning. \"    Learning/Language barrier: no     HEP/Strategies/Orthosis Compliance: Patient verbally confirmed compliant with HEP's Patient demo understanding. Patient with excellent follow through      Restrictions: none      OBJECTIVE EXAMINATION     TREATMENT     Focus of treatment was on the following:   strengthening and ROM of R hand      Exercises/Activities:  Tip to tip pinch placing standard clothespins on vertical yardstick, 53 clips  Grasp and twist 10 lb. resistance theraband flex bar, 10 reps each direction  Grasp and flex 10 lb.  resistance flex bar, 10 reps  Rapid grasp and release red power web, 2 x 10  Rolling palm and digits across textured cylinder for stretch, 10 reps     Wrist flex/ext using 4 lb. dumbbell, 8 reps  In standing while Grasping plyoball, .5 kg & 1 kg touching numbers on wall 1-9, 3 reps each  30 lb. chest presses w/ RUE only, forearm supinated and forearm neutral, 10 reps each  Fine motor manipulation RUE removing screws from [place] 9 work station and holding them in hand, 5 screws on and off board. Fine motor manipulation using [place] 4 work station, removing large nuts from bolts with vision occluded x 8 reps. Isolated digit blocking R middle digit, flexion/extension 2 reps each joint. Thermal ultrasound intensity 1 MHz at 1.5 Acuña/cm2 pulsed 50% over dorsal scar near thenar web space to decrease scar and soft tissue restrictions. Patient Education/HEP:    Aggressive scar massage thenar web space 5 x a day, Continue recommended HEP/activities. ASSESSMENT       Assessment: Pt tolerated treatment well. Pt making good  progress towards goals. Pt. drops items out of R hand during fine motor manipulation tasks. Pt. lacks full extension of middle digit with gross grasp/release tasks, however demo full extension during digit blocking. Extension limited due to significant scarring at base of ring finger. Pt. reports he is unable to operate power tools or screwdriver at this time. Pt. presently able to button his jeans independently. Pt. making good progress with report of fatigue and mild wrist soreness RUE. Post Treatment Pain: Pt denies pain    Patient's Activity Tolerance: good                 GOALS         Long Term Goals  Time Frame for Long Term Goals : 16-24 visit(6-8 weeks)  Long Term Goal 1: Patient will report pain 2 or less during functional activities  Long Term Goal 2: Patient  will be indep with all recommended HEP's, adaptive strategies, and adaptive techniques.   Long Term Goal 3: Patient will report decreased frequency of numbness/tingling in RUE. Long Term Goal 4: Patient will increase AROM of RUE wrist and hand to [facility] [place] to increase performance with I/ADL's  Long Term Goal 5: Patient  will increase RUE strength from current to [place] [place] to increase performance with I/ADL's  Long Term Goal 6: Patient  will increase R  strength from current to 80% norm for age and gender to increase performance with I/ADL's. Long Term Goal 7: Patient  will increase R pinch strength from current to 80% norm for age and gender  to increase performance with I/ADLs  Long Term Goal 8: Patient  will increase dexterity in R hand as observed by 9 hole peg test by performing [place] [place] for age and gender to increase performance with I/ADL's. Long Term Goal 9: Patient  will decrease edema in RUE to increase ROM and performance with I/ADL's. Long Term Goal 10: Pt. will utilize positioning and adaptive techniques to perform light weight work related activities indep. with RUE.          TREATMENT PLAN   Continue POC           Electronically signed by ANIL Yates  on 11/15/2022 at 10:28 AM

## 2022-11-17 ENCOUNTER — HOSPITAL ENCOUNTER (OUTPATIENT)
Dept: OCCUPATIONAL THERAPY | Age: 47
Setting detail: THERAPIES SERIES
Discharge: HOME | End: 2022-11-17
Payer: COMMERCIAL

## 2022-11-17 NOTE — PROGRESS NOTES
Therapy                            Cancellation/No-show Note    Date: 2022  Patient Name: Therese Presley    : 1975  (50 y.o.)     MRN: [id number]    Account #: [de-identified]    No Show: 2  Canceled Appointment: 4    Comments: For today's appointment patient:  [x]  Cancelled  []  Rescheduled appointment  []  No-show   []  Called pt to remind of next appointment     Reason given by patient:  []  Patient ill  []  Conflicting appointment  []  No transportation    []  Conflict with work  []  No reason given  [x]  Other:  daughter is ill    [x] Pt has future appointments scheduled, no follow up needed  [] Pt requests to be on hold. Reason:   If > 2 weeks please discuss with therapist.  [] Therapist to call pt for follow up     Signature:  ANIL Upton 2022 2:10 PM

## 2022-11-21 ENCOUNTER — HOSPITAL ENCOUNTER (OUTPATIENT)
Dept: OCCUPATIONAL THERAPY | Age: 47
Setting detail: THERAPIES SERIES
Discharge: HOME | End: 2022-11-21
Payer: COMMERCIAL

## 2022-11-21 PROCEDURE — 97140 MANUAL THERAPY 1/> REGIONS: CPT

## 2022-11-21 PROCEDURE — 97530 THERAPEUTIC ACTIVITIES: CPT

## 2022-11-21 NOTE — PROGRESS NOTES
[facility]     Occupational Therapy: Daily Note   Patient: Vivi Owens (62 y.o. male)   Date:   Plan of Care Certification Period:  22   :  1975  MRN: [id number]  CSN: [id number]   Insurance: Payor: [address] [facility]  [address] / Plan: [facility] [facility] [address] / Product Type: *No Product type* /   Insurance ID: [id number] - (Medicaid Managed) Secondary Insurance (if applicable):    Referring Physician: Mikie Kawasaki, MD     PCP: Lexi Lenz MD Visits to Date: Total # of Visits to Date: 26   Progress note:Progress Note Counter: 26  Visits Approved: 28    No Show: 2  Cancelled Appts:4     Medical Diagnosis: Unspecified fracture of fifth metacarpal bone, right hand, initial encounter for open fracture [S62.306B]  Displaced fracture of base of fourth metacarpal bone, right hand, initial encounter for open fracture [S62.314B] Displaced fx. D4 & D5 RUE        Therapy Time     Time in 0900   Time out 1000   Total treatment minutes 60   Total time code minutes  60 Minutes        OT Manual therapy 8 minutes for 1 unit(s), CPT 91217  OT Therapeutic activities 52 minutes for 3 unit(s), CPT 99068       SUBJECTIVE EXAMINATION     Patient's date of birth confirmed: Yes     Pain Level:   Pt denies pain    Patient Comments:   \"I still can't open my daughter's sippy cup or open a jar. \"  \"It is the little fine motor stuff I can't do. \"    Learning/Language barrier: no     HEP/Strategies/Orthosis Compliance: Patient verbally confirmed compliant with HEP's        OBJECTIVE EXAMINATION   Measured RUE:    Average of  Three tries     RIGHT              CURRENT      Right          last measure  (10/11/22)      Right              Norm      [place] lb. 27 14 103   PALMAR  Pinch  Lb. 10 4 19   LATERAL  Pinch  Lb. 9 4 20.5          Right       MMT A Norm    Wrist         Flexion NT 70 0-70   Extension  NT 60 0-60   Ulnar deviation NT 30 0-30   Radial Deviation  NT 20 0-20 Right   Normal    MP PIP DIP       0-90 0-100 0-70       A P A P A P   Index                 Extension   WFL  NT WFL NT WFL NT   Flexion   46 NT 86 NT 55 NT   Middle                 Extension   WFL NT -49 NT WFL NT   Flexion   32 NT 86 NT 65 NT   Ring                 Extension   WFL NT -50 NT WFL NT   Flexion    51 NT 86 NT 56 NT   Little                  Extension   WFL NT WFL NT WFL NT   flexion   62 NT 89 NT 72 NT     WOUND:  Wound is well healed and completely closed at this time with mild dryness, peeling skin. Skin darkened pigment around scar edges with mild adhesions noted. No drainage or open areas. Scar tissue volar surface base of ring finger is significantly shortened and adhered, limiting active motion of digit. TREATMENT     Focus of treatment was on the following:   strengthening R hand and wrist and assess for progress toward goals     MFR/Manual:   Deep pressure scar mobilization in thenar web space and along scar dorsal hand provided with stretch to web space, provided cross friction mobilization at scar base of ring finger    Exercises/Activities RUE:  3 pt. prehension placing and removing 1,2 & 4 lb. resistance clips on vertical pole, 21 clips x2  1.5 lb. hammer performing sup/pro, 2 x 10 and rad/ulnar dev with elbow flex/ext(simulating hammering activity) 2 x 10. Repetitive grasp/release with 3 second hold 10 lb. hand gripper, 2 x 10  AAROM MP flexion all digits x 8 reps  15 lb. flex bar:  twist CW/CCW x 10 each direction   grasp and flex x 10        hold at one end and oscillate bar forward/back and side to side x 15 each  AROM digit extension while maintaining palm on table surface, 5 reps each digit. Patient Education/HEP:   Continue recommended HEP/activities. ASSESSMENT       Assessment: Pt reported no pain after OT treatment. Pt making good  progress towards goals. Pt.'s  has improved 13 lbs. and pinch 5-6 lbs. Pt. also has improved AROM of hand each joint. Progress slow and steady with good wound healing. Pt. experiences pain during PROM flexion MP joints only. Pt. demo excellent follow through with all HEPs. Post Treatment Pain: Pt denies pain    Patient's Activity Tolerance: good                 GOALS         Long Term Goals  Time Frame for Long Term Goals : 16-24 visit(6-8 weeks)  Long Term Goal 1: Patient will report pain 2 or less during functional activities  Long Term Goal 2: Patient  will be indep with all recommended HEP's, adaptive strategies, and adaptive techniques. Long Term Goal 3: Patient will report decreased frequency of numbness/tingling in RUE. Long Term Goal 4: Patient will increase AROM of RUE wrist and hand to [facility] [place] to increase performance with I/ADL's  Long Term Goal 5: Patient  will increase RUE strength from current to [place] [place] to increase performance with I/ADL's  Long Term Goal 6: Patient  will increase R  strength from current to 80% norm for age and gender to increase performance with I/ADL's. Long Term Goal 7: Patient  will increase R pinch strength from current to 80% norm for age and gender  to increase performance with I/ADLs  Long Term Goal 8: Patient  will increase dexterity in R hand as observed by 9 hole peg test by performing [place] [place] for age and gender to increase performance with I/ADL's. Long Term Goal 9: Patient  will decrease edema in RUE to increase ROM and performance with I/ADL's. Long Term Goal 10: Pt. will utilize positioning and adaptive techniques to perform light weight work related activities indep. with RUE. TREATMENT PLAN   Request authorization for more visits per physician order to continue therapy 2-3x week for 6 weeks. Progress note sent. Continue 2 x week to improve R hand functional skills to return to work safely.            Electronically signed by ANIL Yates  on 11/21/2022 at 1:20 PM

## 2022-11-21 NOTE — PROGRESS NOTES
OCCUPATIONAL THERAPY PLAN OF CARE     [facility]   [address] [facility] [address] [place]  Phone: [phone]    [] Certification     [x] Recertification     [] Plan of Care    [x] Progress Note        Date: 2022    To:Referring Practitioner: Dr. Jeffery Barkley          From: Paz Favre, OTR/L  Patient: Iglesia Fowler       : 1975  MRN: [id number]  Diagnosis:Diagnosis: Displaced fx. D4 & D5 RUE   Date of eval: 2022    Visit Information:   Onset Date: 22  OT Insurance Information: [employer] Medicaid  Total # of Visits Approved: 28  Total # of Visits to Date: 32  Certification Period Expiration Date: 22  No Show: 2  Progress Note Due Date: 22  Canceled Appointment: 4  Progress Note Counter: 32    Last POC date: 10/25/2022   Reporting period: 10/                             Assessment:    Goals Current/Discharge status  Met   Long Term Goal 1: Patient will report pain 2 or less during functional activities Pt. only experiences pain with PROM MP joint flexion D2-4. [x] Met  [] Partially Met  [] Not Met   Long Term Goal 2: Patient  will be indep with all recommended HEP's, adaptive strategies, and adaptive techniques. Good follow through with HEPs. HEPs continue to be upgraded. [] Met  [x] Partially Met  [] Not Met   Long Term Goal 3: Patient will report decreased frequency of numbness/tingling in RUE.  Pt. reports all numbness has alleviated except for small area dorsal surface of 2nd metacarpal [x] Met  [] Partially Met  [] Not Met   Long Term Goal 4: Patient will increase AROM of RUE wrist and hand to [facility] to increase performance with I/ADL's See chart below [] Met  [x] Partially Met  [] Not Met   Long Term Goal 5: Patient  will increase RUE strength from current to [facility] to increase performance with I/ADL's See chart below [] Met  [x] Partially Met  [] Not Met   Long Term Goal 6: Patient  will increase R  strength from current to 80% norm for age and gender to increase performance with I/ADL's. See chart below [] Met  [x] Partially Met  [] Not Met   Long Term Goal 7: Patient  will increase R pinch strength from current to 80% norm for age and gender  to increase performance with I/ADLs   See chart below [] Met  [x] Partially Met  [] Not Met   Pt. will increase dexterity R hand as observed by 9 hole peg test performing [facility] for age/gender Not tested as pt. unable to consistently  pegs Not met   Pt. will utilize positioning and adaptive techniques to perform light weight work related tasks indep. Pt is able to grasp and turn screwdriver with mild difficulty and is able to grasp hammer to perform hammering motion without making contact with a surface. Partially met        Average of  Three tries     RIGHT              CURRENT      Right          last measure  (10/11/22)      Right              Norm      [place] lb. 27 14 103   PALMAR  Pinch  Lb. 10 4 19   LATERAL  Pinch  Lb. 9 4 20.5                 Right       MMT A Norm    Wrist         Flexion NT 70 0-70   Extension  NT 60 0-60   Ulnar deviation NT 30 0-30   Radial Deviation  NT 20 0-20                    Right   Normal    MP PIP DIP       0-90 0-100 0-70       A P A P A P   Index                 Extension   WFL  NT WFL NT WFL NT   Flexion   46 NT 86 NT 55 NT   Middle                 Extension   WFL NT -49 NT WFL NT   Flexion   32 NT 86 NT 65 NT   Ring                 Extension   WFL NT -50 NT WFL NT   Flexion    51 NT 86 NT 56 NT   Little                  Extension   WFL NT WFL NT WFL NT   flexion   62 NT 89 NT 72 NT      WOUND:  Wound is well healed and completely closed at this time with mild dryness, peeling skin. Skin darkened pigment around scar edges with mild adhesions noted. No drainage or open areas. Scar tissue volar surface base of ring finger is significantly shortened and adhered, limiting active motion of digit.        TREATMENT PLAN:  [] Evaluate & Treat [] Neuromuscular Re-education   [x] Re-evaluation [] Tissue (stress) Loading Program   [] Pain Management [x] PROM/Stretching/AAROM/AROM   [x] Edema Management [] Splinting   [x] Wound Care/Scar Management [] Desensitization   [x] ADL Training [x] Strengthening/Graded Therapeutic Activity   [] Tendon Repair Program [x] Coordination/Dexterity Training   [x] Instruction/Application of energy [x] Manual Techniques       conservation, work simplification [x] Instruction in HEP       joint protection, body mechanics [] Aquatic Therapy   [x] Modalities: [x] Ultrasound   [] Infrared [x] Electrical Stimulation [x] Fluidotherapy                          [] Hot/Cold Pack [] Paraffin    [] Other:                             Frequency:  2-3 days per week  Duration:  6 weeks     Rehab Potential: [] Excellent [x] Good  [] Fair  [] Poor      Patient Status:    [x] Continue/Initate plan of Care                    []  Discharge OT         [x]  Additional visits requested, please re-certify for additional visits        Electronically signed by: ANIL Brown 11/21/2022 1:47 PM    If you have any questions or concerns, please don't hesitate to call. Thank you for your referral.      I have reviewed this plan of care and certify a need for medically necessary rehabilitation services.     Physician Signature:__________________________________________________________    Date: 11/21/2022  Please sign and return

## 2022-11-23 ENCOUNTER — HOSPITAL ENCOUNTER (OUTPATIENT)
Dept: OCCUPATIONAL THERAPY | Age: 47
Setting detail: THERAPIES SERIES
Discharge: HOME | End: 2022-11-23
Payer: COMMERCIAL

## 2022-11-23 PROCEDURE — 97530 THERAPEUTIC ACTIVITIES: CPT

## 2022-11-23 PROCEDURE — 97140 MANUAL THERAPY 1/> REGIONS: CPT

## 2022-11-23 NOTE — PROGRESS NOTES
[facility]     Occupational Therapy: Daily Note   Patient: Purvi Vargas (35 y.o. male)   Date:   Plan of Care Certification Period:  22   :  1975  MRN: [id number]  CSN: [id number]   Insurance: Payor: [facility] [facility]  [address] / Plan: [address] [facility] [address] / Product Type: *No Product type* /   Insurance ID: [id number] - (Medicaid Managed) Secondary Insurance (if applicable):    Referring Physician: Janee Vega MD     PCP: Dianne Borja MD Visits to Date: Total # of Visits to Date: 27   Progress note:Progress Note Counter: 27  Visits Approved: 28    No Show: 2  Cancelled Appts:4     Medical Diagnosis: Unspecified fracture of fifth metacarpal bone, right hand, initial encounter for open fracture [S62.306B]  Displaced fracture of base of fourth metacarpal bone, right hand, initial encounter for open fracture [S62.314B] Displaced fx. D4 & D5 RUE        Therapy Time     Time in 0900   Time out 1000   Total treatment minutes 60   Total time code minutes  60 Minutes        OT Manual therapy 28 minutes for 2 unit(s), CPT 13834  OT Therapeutic activities 32 minutes for 2 unit(s), CPT 22102       SUBJECTIVE EXAMINATION     Patient's date of birth confirmed: Yes     Pain Level:   Pt denies pain    Patient Comments: \"My hand swells up if I do too much, but I can get it to go down. \"    Learning/Language barrier: no     HEP/Strategies/Orthosis Compliance: Patient verbally confirmed compliant with HEP's Patient demo understanding. Patient with good follow through     Restrictions: none      OBJECTIVE EXAMINATION     TREATMENT     Focus of treatment was on the following:   strengthening and ROM of R wrist and hand      MFR/Manual:   Provided Deep palmar soft tissue mobilization using Dycem pad and small vibrator with scar massage dorsal scar.   Joint mob of MP joints all digits w/ PROM and place and hold tolerated with significant restriction noted at middle and ring digit. Performed cupping with small cup over entire dorsal scar with good results. Exercises/Activities  RUE:  Repetitive  w/ rubber band hand gripper, 2 x 10 reps w/ 3 sec hold  Using large amount red theraputty, pt. used palm and fingertips to knead and press putty repetitively until hand fatigued. Tip to tip pinch strengthening pinching around edge of red putty, 10 each digit  Green theratube strengthening:  wrist flex/ext x 10       shoulder horizontal ab/adduction w/ wrist ulnar deviation, 10 reps       Patient Education/HEP:   Continue recommended HEP/activities. ASSESSMENT       Assessment: Pt tolerated treatment well. Pt making good  progress towards goals. Improved definition of R palm noted with decreased edema and noticeable palmar creases on visual exam.  Pt. reports fatigue with strengthening tasks. Scar mobility improved with cupping. Good follow through with HEPs noted. Post Treatment Pain: Pt denies pain    Patient's Activity Tolerance: good                 GOALS         Long Term Goals  Time Frame for Long Term Goals : 16-24 visit(6-8 weeks)  Long Term Goal 1: Patient will report pain 2 or less during functional activities  Long Term Goal 2: Patient  will be indep with all recommended HEP's, adaptive strategies, and adaptive techniques. Long Term Goal 3: Patient will report decreased frequency of numbness/tingling in RUE. Long Term Goal 4: Patient will increase AROM of RUE wrist and hand [facility] to increase performance with I/ADL's  Long Term Goal 5: Patient  will increase RUE strength from current to [facility] to increase performance with I/ADL's  Long Term Goal 6: Patient  will increase R  strength from current to 80% norm for age and gender to increase performance with I/ADL's.   Long Term Goal 7: Patient  will increase R pinch strength from current to 80% norm for age and gender  to increase performance with I/ADLs  Long Term Goal 8: Patient  will increase dexterity in R hand as observed by 9 hole peg test by performing [facility] for age and gender to increase performance with I/ADL's. Long Term Goal 9: Patient  will decrease edema in RUE to increase ROM and performance with I/ADL's. Long Term Goal 10: Pt. will utilize positioning and adaptive techniques to perform light weight work related activities indep. with RUE. TREATMENT PLAN   Plan 1 more visit until further authorization obtained to continue ROM, strength for functional use of R hand.            Electronically signed by ANIL Rossi  on 11/23/2022 at 10:18 AM

## 2022-11-30 ENCOUNTER — APPOINTMENT (OUTPATIENT)
Dept: OCCUPATIONAL THERAPY | Age: 47
End: 2022-11-30
Payer: COMMERCIAL

## 2022-12-01 ENCOUNTER — HOSPITAL ENCOUNTER (OUTPATIENT)
Dept: OCCUPATIONAL THERAPY | Age: 47
Setting detail: THERAPIES SERIES
Discharge: HOME | End: 2022-12-01
Payer: COMMERCIAL

## 2022-12-01 PROCEDURE — 97140 MANUAL THERAPY 1/> REGIONS: CPT

## 2022-12-01 PROCEDURE — 97530 THERAPEUTIC ACTIVITIES: CPT

## 2022-12-01 NOTE — PROGRESS NOTES
[facility]     Occupational Therapy: Daily Note   Patient: Tj Cabrales (78 y.o. male)   Date:   Plan of Care Certification Period:  22   :  1975  MRN: [id number]  CSN: [id number]   Insurance: Payor: [facility]  [address] / Plan: [facility] [facility] [address] / Product Type: *No Product type* /   Insurance ID: [id number] - (Medicaid Managed) Secondary Insurance (if applicable):    Referring Physician: Pennie Hanson MD     PCP: Saint Mari, MD Visits to Date: Total # of Visits to Date: 29   Progress note:Progress Note Counter: 28  Visits Approved: 28    No Show: 2  Cancelled Appts:4     Medical Diagnosis: Unspecified fracture of fifth metacarpal bone, right hand, initial encounter for open fracture [S62.306B]  Displaced fracture of base of fourth metacarpal bone, right hand, initial encounter for open fracture [S62.314B] Displaced fx. D4 & D5 RUE        Therapy Time     Time in 0930   Time out 1030   Total treatment minutes 60   Total time code minutes  60 Minutes        OT Manual therapy 14 minutes for 1 unit(s), CPT 62180  OT Therapeutic activities 46 minutes for 3 unit(s), CPT 32585       SUBJECTIVE EXAMINATION     Patient's date of birth confirmed: Yes     Pain Level:   Pt denies pain    Patient Comments:   \"I think if I get this finger released from this scar, I will move much better. \"    Learning/Language barrier: no     HEP/Strategies/Orthosis Compliance: Patient verbally stated non compliant with orthosis schedule. Patient demo understanding. Patient with good. follow through     Restrictions: none      OBJECTIVE EXAMINATION       TREATMENT     Focus of treatment was on the following:   strengthening RUE and alleviating facial restrictions in scars      MFR/Manual:   Cupping of scar dorsal R hand and in soft tissue between MP of index to scar to alleviate restrictions.   Soft tissue mob with retrograde massage tolerated entire hand and fingers. Joint mob MP joints index, middle and ring fingers performed with motion toward flexion. Exercises/Activities strengthening RUE:  Red power web for repetitive , 2 x 15  15 lb. flexbar:  grasp and twist CW/CCW x 15 each                          grasp and flex, 2 x 15   1.5 lb. weighted hammer:  sup/pronation x 15  elbow flex/ext with rad/ulnar dev x 15(simulate swinging hammer)  Wrist flex/ext w/ grasp rolling suspended on band, 2 lb. weight on dowel bar, 2 x 15  Extended arm weight bearing on palm on ball on table, actively flex/extending digits x 20 reps. Holding cylindrical bar in grasp R hand with resistive intermittent pulling provided by therapist x 5 reps  place and hold flexion and extension of all digits w/ 5 sec hold, 10 reps      Patient Education/HEP:   Continue recommended HEP/activities. ASSESSMENT       Assessment: Pt tolerated treatment well. Pt. has difficulty grasping and pulling with R hand. Limited AROM, mostly MP flexion of middle and ring finger persists. Pt. reports he is better able to button and fasten clothing. Good follow through with HEP noted. Post Treatment Pain: Pt denies pain, reports fatigue RUE. Patient's Activity Tolerance: good                 GOALS         Long Term Goals  Time Frame for Long Term Goals : 16-24 visit(6-8 weeks)  Long Term Goal 1: Patient will report pain 2 or less during functional activities  Long Term Goal 2: Patient  will be indep with all recommended HEP's, adaptive strategies, and adaptive techniques. Long Term Goal 3: Patient will report decreased frequency of numbness/tingling in RUE.   Long Term Goal 4: Patient will increase AROM of RUE wrist and hand to [facility] to increase performance with I/ADL's  Long Term Goal 5: Patient  will increase RUE strength from current to [facility] to increase performance with I/ADL's  Long Term Goal 6: Patient  will increase R  strength from current to 80% norm for age and gender to increase performance with I/ADL's. Long Term Goal 7: Patient  will increase R pinch strength from current to 80% norm for age and gender  to increase performance with I/ADLs  Long Term Goal 8: Patient  will increase dexterity in R hand as observed by 9 hole peg test by performing [facility] for age and gender to increase performance with I/ADL's. Long Term Goal 9: Patient  will decrease edema in RUE to increase ROM and performance with I/ADL's. Long Term Goal 10: Pt. will utilize positioning and adaptive techniques to perform light weight work related activities indep. with RUE. TREATMENT PLAN   2 x week for 6 more visits approved for ROM and strengthening R hand.            Electronically signed by ANIL Munson  on 12/1/2022 at 10:33 AM

## 2022-12-05 ENCOUNTER — HOSPITAL ENCOUNTER (OUTPATIENT)
Dept: OCCUPATIONAL THERAPY | Age: 47
Setting detail: THERAPIES SERIES
Discharge: HOME | End: 2022-12-05
Payer: COMMERCIAL

## 2022-12-05 NOTE — PROGRESS NOTES
Therapy                            Cancellation/No-show Note    Date: 2022  Patient Name: Jennifer Thompson    : 1975  (50 y.o.)     MRN: [id number]    Account #: [de-identified]    No Show: 2  Canceled Appointment: 5    Comments: For today's appointment patient:  [x]  Cancelled   &  appt. []  Rescheduled appointment  []  No-show   []  Called pt to remind of next appointment     Reason given by patient:  []  Patient ill  [x]  Conflicting appointment  []  No transportation    []  Conflict with work  []  No reason given  [x]  Other:  out of town    [x] Pt has future appointments scheduled, no follow up needed  [] Pt requests to be on hold. Reason:   If > 2 weeks please discuss with therapist.  [] Therapist to call pt for follow up     Signature:  ANIL Mendenhall 2022 1:41 PM

## 2022-12-07 ENCOUNTER — APPOINTMENT (OUTPATIENT)
Dept: OCCUPATIONAL THERAPY | Age: 47
End: 2022-12-07
Payer: COMMERCIAL

## 2022-12-12 ENCOUNTER — APPOINTMENT (OUTPATIENT)
Dept: OCCUPATIONAL THERAPY | Age: 47
End: 2022-12-12
Payer: COMMERCIAL

## 2022-12-13 ENCOUNTER — HOSPITAL ENCOUNTER (OUTPATIENT)
Dept: OCCUPATIONAL THERAPY | Age: 47
Setting detail: THERAPIES SERIES
Discharge: HOME | End: 2022-12-13
Payer: COMMERCIAL

## 2022-12-13 PROCEDURE — 97110 THERAPEUTIC EXERCISES: CPT

## 2022-12-13 PROCEDURE — 97140 MANUAL THERAPY 1/> REGIONS: CPT

## 2022-12-13 NOTE — PROGRESS NOTES
[facility]     Occupational Therapy: Daily Note   Patient: Tru Hunt (89 y.o. male)   Date:   Plan of Care Certification Period:  22   :  1975  MRN: [id number]  CSN: [id number]   Insurance: Payor: [facility]  [address] / Plan: [address] [place] [address] [address] / Product Type: *No Product type* /   Insurance ID: [id number] - (Medicaid Managed) Secondary Insurance (if applicable):    Referring Physician: Rosey Whitaker MD     PCP: Debbie Murray MD Visits to Date: Total # of Visits to Date: 29   Progress note:Progress Note Counter: 34  Visits Approved: 29 (6 more approved)    No Show: 2  Cancelled Appts:5     Medical Diagnosis: Unspecified fracture of fifth metacarpal bone, right hand, initial encounter for open fracture [S62.306B]  Displaced fracture of base of fourth metacarpal bone, right hand, initial encounter for open fracture [S62.314B] Displaced fx. D4 & D5 RUE        Therapy Time     Time in 1100   Time out 1146   Total treatment minutes 46   Total time code minutes  46 Minutes        OT Manual therapy 11 minutes for 1 unit(s), CPT 47335  OT Therapeutic exercises 35 minutes for 2 unit(s), CPT 26878       SUBJECTIVE EXAMINATION     Patient's date of birth confirmed: Yes     Pain Level:   Pt denies pain    Patient Comments: \"The outside of my hand swelled up yesterday and I didn't do anything to cause it. \"    Learning/Language barrier: no       HEP/Strategies/Orthosis Compliance: Patient verbally confirmed compliant with HEP's        Restrictions: none           OBJECTIVE EXAMINATION         TREATMENT     Focus of treatment was on the following:   strengthening RUE and hand function. MFR/Manual:   Deep pressure mobilization of thenar web space scar and scar at base of ring finger performed with prolonged stretch to area.       Exercises/Activities:  Fine motor control  tasks shuffling deck of cards, dealing out cards, picking up 15 pennies off table and holding them in palm of hand RUE. Ulnar-radial transposition of coins from palm to thumb and index pinch without dropping coins out of palm, 5 successful trials  Wrist flex/ext strengthening w/ 4 lb. dumbbell, 15 reps each direction  Forearm sup/pro w/ tight  using 1.5 lb. hammer x 15  [employer]ollo weight machines:  RUE only 40 lb. chest press x 10           RUE elbow extension w/ shoulder flexed 90* using 30 lb, 2 sets x 15           RUE pull downs w/ 30 lb. weight, 2 x 15       Patient Education/HEP:   Continue recommended HEP/activities. ASSESSMENT       Assessment: Pt tolerated treatment well. Mild edema present dorsal ulnar side of R hand that was not present last session. Pt. has difficulty holding coins in palm of hand for prolonged period and has mod difficulty transitioning coins from ulnar to radial side of hand. Scar at base of ring finger significantly limits function of middle and ring digit. Pt. able to utilize screw  at work, however has not attempted power tools. Good follow through w/HEP noted. Post Treatment Pain: Pt denies pain. Pt. reports mod fatigue and soreness with weighted tasks. Patient's Activity Tolerance: good                 GOALS         Long Term Goals  Time Frame for Long Term Goals : 16-24 visit(6-8 weeks)  Long Term Goal 1: Patient will report pain 2 or less during functional activities  Long Term Goal 2: Patient  will be indep with all recommended HEP's, adaptive strategies, and adaptive techniques. Long Term Goal 3: Patient will report decreased frequency of numbness/tingling in RUE.   Long Term Goal 4: Patient will increase AROM of RUE wrist and hand to [facility] to increase performance with I/ADL's  Long Term Goal 5: Patient  will increase RUE strength from current to [facility] to increase performance with I/ADL's  Long Term Goal 6: Patient  will increase R  strength from current to 80% norm for age and gender to increase performance with I/ADL's. Long Term Goal 7: Patient  will increase R pinch strength from current to 80% norm for age and gender  to increase performance with I/ADLs  Long Term Goal 8: Patient  will increase dexterity in R hand as observed by 9 hole peg test by performing [facility] for age and gender to increase performance with I/ADL's. Long Term Goal 9: Patient  will decrease edema in RUE to increase ROM and performance with I/ADL's. Long Term Goal 10: Pt. will utilize positioning and adaptive techniques to perform light weight work related activities indep. with RUE. TREATMENT PLAN   Pt. to see ortho surgeon this week. Will measure progress next visit.             Electronically signed by YOBANI Padilla/L  on 12/13/2022 at 12:58 PM

## 2022-12-14 ENCOUNTER — HOSPITAL ENCOUNTER (OUTPATIENT)
Dept: OCCUPATIONAL THERAPY | Age: 47
Setting detail: THERAPIES SERIES
Discharge: HOME | End: 2022-12-14
Payer: COMMERCIAL

## 2022-12-14 NOTE — PROGRESS NOTES
Therapy                            Cancellation/No-show Note    Date: 2022  Patient Name: Jose Antonio Joyce    : 1975  (50 y.o.)     MRN: [id number]    Account #: [de-identified]    No Show: 2  Canceled Appointment: 6    Comments: For today's appointment patient:  [x]  Cancelled  []  Rescheduled appointment  []  No-show   []  Called pt to remind of next appointment     Reason given by patient:  []  Patient ill  []  Conflicting appointment  []  No transportation    []  Conflict with work  []  No reason given  [x]  Other:  Pt. injured his back    [x] Pt has future appointments scheduled, no follow up needed  [] Pt requests to be on hold. Reason:   If > 2 weeks please discuss with therapist.  [] Therapist to call pt for follow up     Signature:  ANIL Villagran 2022 10:33 AM

## 2022-12-15 ENCOUNTER — OFFICE VISIT (OUTPATIENT)
Dept: ORTHOPEDIC SURGERY | Age: 47
End: 2022-12-15

## 2022-12-15 VITALS — BODY MASS INDEX: 35 KG/M2 | HEIGHT: 71 IN | WEIGHT: 250 LBS

## 2022-12-15 DIAGNOSIS — M24.549 CONTRACTURE OF JOINT OF FINGER DUE TO SCAR: ICD-10-CM

## 2022-12-15 DIAGNOSIS — M24.549 CONTRACTURE OF JOINT OF FINGER DUE TO SCAR: Primary | ICD-10-CM

## 2022-12-15 DIAGNOSIS — L90.5 CONTRACTURE OF JOINT OF FINGER DUE TO SCAR: Primary | ICD-10-CM

## 2022-12-15 DIAGNOSIS — L90.5 CONTRACTURE OF JOINT OF FINGER DUE TO SCAR: ICD-10-CM

## 2022-12-15 DIAGNOSIS — S62.306B OPEN DISPLACED FRACTURE OF FIFTH METACARPAL BONE OF RIGHT HAND, UNSPECIFIED PORTION OF METACARPAL, INITIAL ENCOUNTER: Primary | ICD-10-CM

## 2022-12-15 NOTE — PROGRESS NOTES
Department of Orthopedic Surgery  History and Physical      CHIEF COMPLAINT: 4 months following right hand crush injury with subsequent debridement and open reduction internal fixation    HISTORY OF PRESENT ILLNESS:                The patient is a 50 y.o. right-hand-dominant male who presents 4 months following right hand crush injury with surgical debridement and open reduction internal fixation. Patient has been doing well in therapy. 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 No new numbness or paresthesias since last seen in October. No other complaints today. Patient is here to discuss potential contracture release of the right ring finger today. Past Medical History:        Diagnosis Date    GERD (gastroesophageal reflux disease)     Hypertension     Kidney stone     MI, old      Past Surgical History:        Procedure Laterality Date    HAND SURGERY Right 8/1/2022    HAND OPEN REDUCTION INTERNAL FIXATION,  INCISION AND DRAINAGE performed by Brooks Fierro MD at [address] [place] [address]     Current Medications:   No current facility-administered medications for this visit. Allergies:  Patient has no known allergies. Social History:   TOBACCO:   reports that he has never smoked. He does not have any smokeless tobacco history on file. ETOH:   reports current alcohol use. DRUGS:   has no history on file for drug use. ACTIVITIES OF DAILY LIVING:    OCCUPATION:    Family History:   History reviewed. No pertinent family history.     REVIEW OF SYSTEMS:  CONSTITUTIONAL:  negative  HEENT:  negative  RESPIRATORY:  negative  CARDIOVASCULAR:  negative  GASTROINTESTINAL: GERD  HEMATOLOGIC/LYMPHATIC:  negative  ENDOCRINE:  negative  MUSCULOSKELETAL: Right ring finger contracture  NEUROLOGICAL:  negative  BEHAVIOR/PSYCH:  negative    PHYSICAL EXAM:    VITALS:  Ht 5' 11\" (1.803 m)   Wt 250 lb (113.4 kg)   BMI 34.87 kg/m²   CONSTITUTIONAL:  awake, alert, cooperative, no apparent distress, and appears stated age  EYES:  Lids and lashes normal, pupils equal, round and reactive to light, extra ocular muscles intact, sclera clear, conjunctiva normal  ENT:  Normocephalic, without obvious abnormality, atraumatic, sinuses nontender on palpation, external ears without lesions, oral pharynx with moist mucus membranes, tonsils without erythema or exudates, gums normal and good dentition. NECK:  Supple, symmetrical, trachea midline, no adenopathy, thyroid symmetric, not enlarged and no tenderness, skin normal  LUNGS:  CTA  CARDIOVASCULAR:  2+ radial pulses, extremities warm and well perfused  ABDOMEN:  NTTP  CHEST:  Atraumatic   GENITAL/URINARY:  deferred  NEUROLOGIC:  Awake, alert, oriented to name, place and time. Cranial nerves II-XII are grossly intact. Motor is 5 out of 5 bilaterally. Sensory is intact.  gait is normal.  MUSCULOSKELETAL:    Right upper extremity:     Right hand with mild swelling. He does have a scar contracture to the ring finger about the volar aspect limiting extension at the PIP joint. No signs of infection. He does have good healing of the dorsal skin flap. No overt signs of infection. Patient able to make composite fist.  Patient also with hyperextension of the long finger MCP joint causing secondary PIP flexion. When the hyperextension deformity of the long finger is corrected, obligate PIP flexion also corrected.         DATA:    CBC:   Lab Results   Component Value Date/Time    WBC 18.1 08/02/2022 04:44 AM    RBC 4.56 08/02/2022 04:44 AM    HGB 14.1 08/02/2022 04:44 AM    HCT 42.2 08/02/2022 04:44 AM    MCV 92.5 08/02/2022 04:44 AM    MCH 30.9 08/02/2022 04:44 AM    MCHC 33.4 08/02/2022 04:44 AM    RDW 12.2 08/02/2022 04:44 AM     08/02/2022 04:44 AM    MPV 9.3 08/02/2022 04:44 AM     PT/INR:  No results found for: PROTIME, INR    Radiologic review: X-rays obtained in the office today: AP lateral obliques of his right hand were obtained in the office demonstrating well-healed and consolidated fractures of the fourth and fifth metacarpals as well as the little finger proximal phalanx. Interdigital space between the index and middle fingers is appropriate. Impression office x-rays: Well-healed fractures about the ring and small fingers with hardware in place    IMPRESSION:  4 months out improving right hand status post complex injury with right ring finger scar contracture  GERD  Hypertension    PLAN:    Overall he is doing quite well. He is pleased with his progress. May continue to advance with therapy as tolerated. He has no restrictions in therapy. Swelling has continued to improve in the hand. We did discuss addressing his right ring finger contracture today. Specifically, a double Z-plasty of the right ring finger will be undertaken. We did also discussed possible FDS lasso of the right long finger MCP joint. However we will only perform the double Z-plasty of the right ring finger during the surgery and see if this corrects the hyperextension of the right long finger MCP joint. Patient will also continue hand therapy leading up to and after surgery. Risks, benefits, and alternatives were discussed with the patient and their family. Risks include but are not limited to infection, blood loss, damage to neurovascular and musculoskeletal structures,  need for further surgery,  stiffness, and catastrophic anesthesia complications. They understand and wish to proceed. Aliyah Juarez DO  Orthopedic surgery resident       I have seen and evaluated the patient and agree with the above assessment and plan on today's visit. I have performed the key components of the history and physical examination with significant findings of scar contracture of ring finger as well as extensor lag of the PIP joints of middle and ring fingers. Complexity of findings were explained. Discussed Z-plasty of the scar followed by therapy.   He understands that this may not address the extensor lag to the adjacent middle finger patient may require a FDS lasso procedure in the future. He voiced understanding all questions answered. . I concur with the findings and plan as documented. I explained the risks, benefits, alternatives and complications of surgery with the patient including but not limited to the risks of infection, possible damage to nerves, vessels, or tendons, stiffness, loss of range of motion, scar sensitivity, wound healing complications, worsening symptoms, possible need for therapy, as well as the possible need further surgery and unanticipated complications. The patient voiced understanding and all questions were answered. The patient elected to proceed with surgical intervention.      Geraldine Vasquez MD  12/15/2022

## 2022-12-15 NOTE — LETTER
[facility].  [address] [address]  Phone: [phone]  Fax: [phone]    Coretta Paget, MD        December 15, 2022     Patient: María Rubin   YOB: 1975   Date of Visit: 12/15/2022       To Whom It May Concern:    María Rubin will be having surgery on 2/28/22. If you have any questions or concerns, please don't hesitate to call.     Sincerely,        Coretta Paget, MD

## 2022-12-19 ENCOUNTER — HOSPITAL ENCOUNTER (OUTPATIENT)
Dept: OCCUPATIONAL THERAPY | Age: 47
Setting detail: THERAPIES SERIES
Discharge: HOME | End: 2022-12-19
Payer: COMMERCIAL

## 2022-12-19 PROCEDURE — 97110 THERAPEUTIC EXERCISES: CPT

## 2022-12-19 PROCEDURE — 97530 THERAPEUTIC ACTIVITIES: CPT

## 2022-12-19 PROCEDURE — 97140 MANUAL THERAPY 1/> REGIONS: CPT

## 2022-12-19 NOTE — PROGRESS NOTES
[facility] OCCUPATIONAL THERAP[facility]     Occupational Therapy: Daily Note   Patient: Lorrie Rosales (56 y.o. male)   Date:   Plan of Care Certification Period:  22   :  1975  MRN: [id number]  CSN: [id number]   Insurance: Payor: [address] [facility]  [address] / Plan: [address] [facility] [address] / Product Type: *No Product type* /   Insurance ID: [id number] - (Medicaid Managed) Secondary Insurance (if applicable):    Referring Physician: Giovany Quispe MD     PCP: Bhavik Gomez MD Visits to Date: Total # of Visits to Date: 30   Progress note:Progress Note Counter: 30  Visits Approved: 29 (6 more approved)    No Show: 2  Cancelled Appts:6     Medical Diagnosis: Unspecified fracture of fifth metacarpal bone, right hand, initial encounter for open fracture [S62.306B]  Displaced fracture of base of fourth metacarpal bone, right hand, initial encounter for open fracture [S62.314B] Displaced fx. D4 & D5 RUE        Therapy Time     Time in 901   Time out 1000   Total treatment minutes 59   Total time code minutes  59 Minutes        OT Manual therapy 10 minutes for 1 unit(s), CPT 91812  OT Therapeutic exercises 34 minutes for 2 unit(s), CPT 29571  OT Therapeutic activities 15 min for 1 unit, CPT 82830     SUBJECTIVE EXAMINATION     Patient's date of birth confirmed: Yes     Pain Level:   Pt denies pain    Patient Comments:   \"I can do a lot more with my hand, but tools are very difficult to manage. \"    Learning/Language barrier: no     HEP/Strategies/Orthosis Compliance: Patient verbally confirmed compliant with HEP's Patient with good. follow through     Restrictions: none for therapy      OBJECTIVE EXAMINATION       TREATMENT     Focus of treatment was on the following:   strengthening LUE and mobilizing soft tissue restrictions      MFR/Manual:   Fluidotherapy at 115° F for 15 minutes while completing AROM R  on foam ball and performing wrist AROM ex.    Cupping over dorsal and ulnar lateral scar of R hand to release scar adhesions. Followed with retrograde massage of entire R hand. Exercises/Activities RUE:  Isolated digit extension AROM with palm on table, 5 reps each digit  Repetitive  with 30 lb. hand gripper, 2 x 15 reps  Isolated digit flexion w/ 7 lb. Digiflex, 10 reps each digit  thumb abduction AROM w/ prolonged stretch at end range to lengthen scar tissue, 10 reps      Fluidotherapy at 115° F for 15 minutes while completing AROM R  on foam ball and performing wrist AROM ex. Cupping over dorsal and ulnar lateral scar of R hand to release scar adhesions. Patient Education/HEP:   Continue recommended HEP/activities. ASSESSMENT       Assessment: Pt making good  progress towards goals. Pt. has no pain complaints, however reports fatigue in hand. Ortho seen and pt. scheduled for Z plasty scar release of ring finger. Pt. slowly progressing with strength R hand. Post Treatment Pain: Pt denies pain    Patient's Activity Tolerance: good                 GOALS         Long Term Goals  Time Frame for Long Term Goals : 16-24 visit(6-8 weeks)  Long Term Goal 1: Patient will report pain 2 or less during functional activities  Long Term Goal 2: Patient  will be indep with all recommended HEP's, adaptive strategies, and adaptive techniques. Long Term Goal 3: Patient will report decreased frequency of numbness/tingling in RUE. Long Term Goal 4: Patient will increase AROM of RUE wrist and hand to [facility] to increase performance with I/ADL's  Long Term Goal 5: Patient  will increase RUE strength from current to [facility] to increase performance with I/ADL's  Long Term Goal 6: Patient  will increase R  strength from current to 80% norm for age and gender to increase performance with I/ADL's.   Long Term Goal 7: Patient  will increase R pinch strength from current to 80% norm for age and gender  to increase performance with I/ADLs  Long Term Goal 8: Patient  will increase dexterity in R hand as observed by 9 hole peg test by [facility] and [facility] to increase performance with I/ADL's. Long Term Goal 9: Patient  will decrease edema in RUE to increase ROM and performance with I/ADL's. Long Term Goal 10: Pt. will utilize positioning and adaptive techniques to perform light weight work related activities indep. with RUE. TREATMENT PLAN   2x week for strengthening entire RUE, work simulation to manage construction tools and manage scar tissue for continued progress.            Electronically signed by ANIL Castillo  on 12/19/2022 at 10:14 AM

## 2022-12-21 ENCOUNTER — HOSPITAL ENCOUNTER (OUTPATIENT)
Dept: OCCUPATIONAL THERAPY | Age: 47
Setting detail: THERAPIES SERIES
Discharge: HOME | End: 2022-12-21
Payer: COMMERCIAL

## 2022-12-21 PROCEDURE — 97140 MANUAL THERAPY 1/> REGIONS: CPT

## 2022-12-21 PROCEDURE — 97530 THERAPEUTIC ACTIVITIES: CPT

## 2022-12-21 NOTE — PROGRESS NOTES
[facility]     Occupational Therapy: Daily Note   Patient: Aidan Barron (78 y.o. male)   Date:   Plan of Care Certification Period:  22   :  1975  MRN: [id number]  CSN: [id number]   Insurance: Payor: [facility]  [address] / Plan: [facility] [address] / Product Type: *No Product type* /   Insurance ID: [id number] - (Medicaid Managed) Secondary Insurance (if applicable):    Referring Physician: Sky Ruelas MD     PCP: Falguni Grimes MD Visits to Date: Total # of Visits to Date: 32   Progress note:Progress Note Counter: 32  Visits Approved: 29 (6 more approved)    No Show: 2  Cancelled Appts:6     Medical Diagnosis: Unspecified fracture of fifth metacarpal bone, right hand, initial encounter for open fracture [S62.306B]  Displaced fracture of base of fourth metacarpal bone, right hand, initial encounter for open fracture [S62.314B] Displaced fx. D4 & D5 RUE        Therapy Time     Time in 09   Time out 1000   Total treatment minutes 59   Total time code minutes  59 Minutes        OT Manual therapy 14 minutes for 1 unit(s), CPT 02176  OT Therapeutic activities 45 minutes for 3 unit(s), CPT 59979       SUBJECTIVE EXAMINATION     Patient's date of birth confirmed: Yes     Pain Level:   Pt denies pain    Patient Comments: \"My hand feels a lot better since you took some of that scar tissue off. \"     Learning/Language barrier: yes     HEP/Strategies/Orthosis Compliance: Patient verbally confirmed compliant with HEP's     Restrictions: none         OBJECTIVE EXAMINATION     Average of  Three tries     RIGHT              CURRENT      Right          last measure  (10/11/22)      Right              Norm      [place] lb. 40 14 103   PALMAR  Pinch  Lb. 14 4 19   LATERAL  Pinch  Lb. 13 4 20.5       Right       MMT A Norm    Wrist         Flexion NT 70 0-70   Extension  NT 60 0-60   Ulnar deviation NT 30 0-30   Radial Deviation  NT 20 0-20 Right   Normal    MP PIP DIP       0-90 0-100 0-70       A P A P A P   Index                 Extension   WFL  NT WFL NT WFL NT   Flexion   54 NT 86 NT 55 NT   Middle                 Extension   WFL NT -49 NT WFL NT   Flexion   41 NT 86 NT 62 NT   Ring                 Extension   WFL NT -60 NT WFL NT   Flexion    50 NT 86 NT 54 NT   Little                  Extension   WFL NT WFL NT WFL NT   flexion   62 NT 89 NT 74 NT       TREATMENT     Focus of treatment was on the following:   assess progress, strengthen RUE      MFR/Manual:   Cupping over entire dorsal scar and base of index MP joint. Followed with aggressive deep scar mobilization using Dycem non- slip  for glide of skin. Applied lotion to scar in thenar web space and applied prolonged pressure for stretch to release scar adhesions. Exercises/Activities RUE:  Remeasured RUE as above. Palmar weight bearing, pulling/pushing w/ fingers and thumb radial adduction strengthening using large amount red theraputty on table surface to knead, push, pull and pinch w/ R hand until fatigued. Repetitive  w/ 40 lb. hand gripper, 2 x 10  AAROM for hook, flat and roof top fist, 10 reps each    Patient Education/HEP:   Continue recommended HEP/activities. Recommended pt. apply lotion to entire scar several times a day to alleviate peeling, sloughing skin. ASSESSMENT       Assessment: Pt making good  progress towards goals. Improved MP flexion,  and pinch noted. Pt. 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 Pt. awaiting surgery to alleviate this issue. Pt. motivated to improve and demo good follow through with HEPs.         Post Treatment Pain: Pt denies pain    Patient's Activity Tolerance: good                 GOALS         Long Term Goals  Time Frame for Long Term Goals : 16-24 visit(6-8 weeks)  Long Term Goal 1: Patient will report pain 2 or less during functional activities  Long Term Goal 2: Patient  will be indep with all recommended HEP's, adaptive strategies, and adaptive techniques. Long Term Goal 3: Patient will report decreased frequency of numbness/tingling in RUE. Long Term Goal 4: Patient will increase AROM of RUE wrist and hand to [facility] [place] to increase performance with I/ADL's  Long Term Goal 5: Patient  will increase RUE strength from current to [facility] [place] to increase performance with I/ADL's  Long Term Goal 6: Patient  will increase R  strength from current to 80% norm for age and gender to increase performance with I/ADL's. Long Term Goal 7: Patient  will increase R pinch strength from current to 80% norm for age and gender  to increase performance with I/ADLs  Long Term Goal 8: Patient  will increase dexterity in R hand as observed by 9 hole peg test by performing [facility] [place] for age and gender to increase performance with I/ADL's. Long Term Goal 9: Patient  will decrease edema in RUE to increase ROM and performance with I/ADL's. Long Term Goal 10: Pt. will utilize positioning and adaptive techniques to perform light weight work related activities indep. with RUE. 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            Electronically signed by ANIL Serrano  on 12/21/2022 at 11:32 AM

## 2022-12-23 ENCOUNTER — TELEPHONE (OUTPATIENT)
Dept: PHYSICAL THERAPY | Age: 47
End: 2022-12-23

## 2022-12-23 NOTE — TELEPHONE ENCOUNTER
SPOKE TO PATIENT AND IS GOING ELSEWHERE CLOSER TO HOME Implemented All Universal Safety Interventions:  Rachel to call system. Call bell, personal items and telephone within reach. Instruct patient to call for assistance. Room bathroom lighting operational. Non-slip footwear when patient is off stretcher. Physically safe environment: no spills, clutter or unnecessary equipment. Stretcher in lowest position, wheels locked, appropriate side rails in place.

## 2022-12-27 ENCOUNTER — APPOINTMENT (OUTPATIENT)
Dept: OCCUPATIONAL THERAPY | Age: 47
End: 2022-12-27
Payer: COMMERCIAL

## 2022-12-28 ENCOUNTER — HOSPITAL ENCOUNTER (OUTPATIENT)
Dept: OCCUPATIONAL THERAPY | Age: 47
Setting detail: THERAPIES SERIES
Discharge: HOME | End: 2022-12-28
Payer: COMMERCIAL

## 2022-12-28 DIAGNOSIS — L90.5 CONTRACTURE OF JOINT OF FINGER DUE TO SCAR: Primary | ICD-10-CM

## 2022-12-28 DIAGNOSIS — M24.549 CONTRACTURE OF JOINT OF FINGER DUE TO SCAR: Primary | ICD-10-CM

## 2022-12-28 PROCEDURE — 97140 MANUAL THERAPY 1/> REGIONS: CPT

## 2022-12-28 PROCEDURE — 97530 THERAPEUTIC ACTIVITIES: CPT

## 2022-12-28 NOTE — PROGRESS NOTES
[facility]     Occupational Therapy: Daily Note   Patient: Aidan Barron (43 y.o. male)   Date: 9195  Plan of Care Certification Period:  22   :  1975  MRN: [id number]  CSN: [id number]   Insurance: Payor: [facility]  [address] / Plan: [address] [place] [address] [address] / Product Type: *No Product type* /   Insurance ID: [id number] - (Medicaid Managed) Secondary Insurance (if applicable):    Referring Physician: Sky Ruelas MD     PCP: Falguni Grimes MD Visits to Date: Total # of Visits to Date: 28   Progress note:Progress Note Counter: 28  Visits Approved: 29 (6 more approved)    No Show: 2  Cancelled Appts:6     Medical Diagnosis: Unspecified fracture of fifth metacarpal bone, right hand, initial encounter for open fracture [S62.306B]  Displaced fracture of base of fourth metacarpal bone, right hand, initial encounter for open fracture [S62.314B] Displaced fx. D4 & D5 RUE        Therapy Time     Time in 0802   Time out 0900   Total treatment minutes 58   Total time code minutes  58 Minutes        OT Manual therapy 8 minutes for 1 unit(s), CPT 76099  OT Therapeutic activities 50 minutes for 3 unit(s), CPT 61883       SUBJECTIVE EXAMINATION     Patient's date of birth confirmed: Yes     Pain Level:   Pt denies pain    Patient Comments:   \"I have trouble with simple things like turning the key in the ignition and writing with a pencil. \"    Learning/Language barrier: no     HEP/Strategies/Orthosis Compliance: Patient verbally confirmed compliant with HEP's Patient demo understanding. Patient with good. follow through      Restrictions: none      OBJECTIVE EXAMINATION     TREATMENT     Focus of treatment was on the following:   strengthening R hand and work simulation tasks      MFR/Manual:   Deep soft tissue mobilization of entire hand and digits with retrograde massage to ulnar side of hand for edema reduction.   Deep pressure applied to thenar web space scar while maintaining  thumb abduction for stretch to alleviate scar tissue restrictions. Exercises/Activities RUE:  Elbow flex/extension with radial/ulnar dev swinging 1.5 lb. hammer x 25 reps  Fine finger manipulation using [place] 4 to apply and remove various size nuts with vision occluded, 20 nuts/bolts  Lifting 5 lb. metal blocks, using nutdriver to connect 2 blocks together with bolt, washer and nut x 10 reps  Picking up 3 lb. blocks with R hand, manipulating to turn them over in hand and place on table, 10 reps  Throwing bean bags at a target with force, 15 bags  Constructing and taking apart metal pipe tree with R hand only x 10 minutes. Lifting and carrying 50 lb. sand bag x 2. Patient Education/HEP:   Continue recommended HEP/activities. ASSESSMENT       Assessment: Pt tolerated treatment well with report of fatigue R wrist and forearm. Pt. lacks coordination ulnar side of hand due to ring finger scar contracture. Pt. was able to perform all tasks without difficulty. Pt. demo good follow through with HEPs. Post Treatment Pain: Pt denies pain    Patient's Activity Tolerance: good                 GOALS         Long Term Goals  Time Frame for Long Term Goals : 16-24 visit(6-8 weeks)  Long Term Goal 1: Patient will report pain 2 or less during functional activities  Long Term Goal 2: Patient  will be indep with all recommended HEP's, adaptive strategies, and adaptive techniques. Long Term Goal 3: Patient will report decreased frequency of numbness/tingling in RUE. Long Term Goal 4: Patient will increase AROM of RUE wrist and hand to [facility] to increase performance with I/ADL's  Long Term Goal 5: Patient  will increase RUE strength from current to [facility] to increase performance with I/ADL's  Long Term Goal 6: Patient  will increase R  strength from current to 80% norm for age and gender to increase performance with I/ADL's.   Long Term Goal 7: Patient  will increase R pinch strength from current to 80% norm for age and gender  to increase performance with I/ADLs  Long Term Goal 8: Patient  will increase dexterity in R hand as observed by 9 hole peg test by performing [facility] for age and gender to increase performance with I/ADL's. Long Term Goal 9: Patient  will decrease edema in RUE to increase ROM and performance with I/ADL's. Long Term Goal 10: Pt. will utilize positioning and adaptive techniques to perform light weight work related activities indep. with RUE. TREATMENT PLAN   Pt. awaiting surgery for ring finger contracture. Physician requesting continue strengthening of R hand and fine motor control to improve functional performance. Pt. requesting 1 x week with plan to perform HEPs daily. Plan:   1 x week for 8-10 weeks.            Electronically signed by ANIL Romero  on 12/28/2022 at 9:08 AM

## 2022-12-29 ENCOUNTER — APPOINTMENT (OUTPATIENT)
Dept: OCCUPATIONAL THERAPY | Age: 47
End: 2022-12-29
Payer: COMMERCIAL

## 2022-12-29 ENCOUNTER — HOSPITAL ENCOUNTER (OUTPATIENT)
Dept: OCCUPATIONAL THERAPY | Age: 47
Setting detail: THERAPIES SERIES
Discharge: HOME | End: 2022-12-29
Payer: COMMERCIAL

## 2022-12-29 PROCEDURE — 97530 THERAPEUTIC ACTIVITIES: CPT

## 2022-12-29 NOTE — PROGRESS NOTES
[facility]     Occupational Therapy: Daily Note   Patient: Mervat Foreman (2 y.o. male)   Date: 15/67/[birth year]183  Plan of Care Certification Period:  22   :  1975  MRN: [id number]  CSN: [id number]   Insurance: Payor: [address] [place] [address] [address] / Plan: [address] [place] [address] [address] / Product Type: *No Product type* /   Insurance ID: [id number] - (Medicaid Managed) Secondary Insurance (if applicable):    Referring Physician: Tang Wilson MD     PCP: Carmen Keita MD Visits to Date: Total # of Visits to Date: 35   Progress note:Progress Note Counter: 35  Visits Approved: 29 (6 more approved)    No Show: 2  Cancelled Appts:6     Medical Diagnosis: Unspecified fracture of fifth metacarpal bone, right hand, initial encounter for open fracture [S62.306B]  Displaced fracture of base of fourth metacarpal bone, right hand, initial encounter for open fracture [S62.314B] Displaced fx. D4 & D5 RUE        Therapy Time     Time in 0900   Time out 0955   Total treatment minutes 55   Total time code minutes  55 Minutes        OT Therapeutic activities 55 minutes for 4 unit(s), CPT 91687       SUBJECTIVE EXAMINATION     Patient's date of birth confirmed: Yes     Pain Level:   Pt denies pain    Patient Comments:   \"I still have a lot of trouble tying my shoes and writing with my R hand. \"     Learning/Language barrier: no     HEP/Strategies/Orthosis Compliance: Patient verbally stated non compliant with orthosis schedule. Patient with good.  follow through     Restrictions: none         OBJECTIVE EXAMINATION     TREATMENT     Focus of treatment was on the following:   fine motor tasks and strengthening RUE      Exercises/Activities RUE:  Practiced printing and writing with built up pen x 5 minutes  Tied wide piece of yarn around yardstick x 4 trials  Radial-ulnar dissociation tasks picking up small pop beads, holding them in ulnar side of hand trying to transition beads to thumb and index digit without dropping them out of hand x 5 trials. Oscillating motions of wrist and forearm using BOING for forward/back and side to side swing, 45 sec x 4 trials  Lifting and turning 2 lb. metal cylinders x 11 cylinders x 2  Push/pull 95 lb. cart 25 ft. w/ RUE only x 5 reps     Wrist circumduction using 1.5 lb. hammer, 20 reps CW/CCW x 2    Patient Education/HEP:   Continue recommended HEP/activities. ASSESSMENT       Assessment: Pt making good  progress towards goals. Pt.'s contracture and inability to extend middle digit interferes with ability to write and tie. Pt. is able to write legibly if utilizing index and thumb only. Pt. is unable to hold items in ulnar side of hand and transition them to radial side of hand without dropping items. Pt. demo good follow through with HEP. Post Treatment Pain: Pt denies pain    Patient's Activity Tolerance: good                 GOALS         Long Term Goals  Time Frame for Long Term Goals : 16-24 visit(6-8 weeks)  Long Term Goal 1: Patient will report pain 2 or less during functional activities  Long Term Goal 2: Patient  will be indep with all recommended HEP's, adaptive strategies, and adaptive techniques. Long Term Goal 3: Patient will report decreased frequency of numbness/tingling in RUE. Long Term Goal 4: Patient will increase AROM of RUE wrist and hand to [facility] to increase performance with I/ADL's  Long Term Goal 5: Patient  will increase RUE strength from current to [facility] to increase performance with I/ADL's  Long Term Goal 6: Patient  will increase R  strength from current to 80% norm for age and gender to increase performance with I/ADL's.   Long Term Goal 7: Patient  will increase R pinch strength from current to 80% norm for age and gender  to increase performance with I/ADLs  Long Term Goal 8: Patient  will increase dexterity in R hand as observed by 9 hole peg test [facility] to increase performance with I/ADL's. Long Term Goal 9: Patient  will decrease edema in RUE to increase ROM and performance with I/ADL's. Long Term Goal 10: Pt. will utilize positioning and adaptive techniques to perform light weight work related activities indep. with RUE. TREATMENT PLAN   Pt. requested 1 x week for remaining visits recommended due to pending surgery and good follow through with HEPs. Therapist agrees with plan to improve strength and function of R hand prior to scar contracture release surgery.            Electronically signed by ANIL Tilley  on 12/29/2022 at 9:56 AM

## 2023-01-03 ENCOUNTER — APPOINTMENT (OUTPATIENT)
Dept: OCCUPATIONAL THERAPY | Age: 48
End: 2023-01-03
Payer: COMMERCIAL

## 2023-01-05 ENCOUNTER — APPOINTMENT (OUTPATIENT)
Dept: OCCUPATIONAL THERAPY | Age: 48
End: 2023-01-05
Payer: COMMERCIAL

## 2023-01-05 ENCOUNTER — HOSPITAL ENCOUNTER (OUTPATIENT)
Dept: OCCUPATIONAL THERAPY | Age: 48
Setting detail: THERAPIES SERIES
Discharge: HOME | End: 2023-01-05
Payer: COMMERCIAL

## 2023-01-05 PROCEDURE — 97530 THERAPEUTIC ACTIVITIES: CPT

## 2023-01-05 PROCEDURE — 97140 MANUAL THERAPY 1/> REGIONS: CPT

## 2023-01-05 NOTE — PROGRESS NOTES
[facility]     Occupational Therapy: Daily Note   Patient: Deanne Barrera (29 y.o. male)   Date:   Plan of Care Certification Period:  22   :  1975  MRN: [id number]  CSN: [id number]   Insurance: Payor: [address] [place] [address]  [address] / Plan: [address] [place] [address] [address] / Product Type: *No Product type* /   Insurance ID: [id number] - (Medicaid Managed) Secondary Insurance (if applicable):    Referring Physician: Debbie Mujica MD     PCP: Neno Varela MD Visits to Date: Total # of Visits to Date: 29   Progress note:Progress Note Counter: 29  Visits Approved: 29 (6 more approved)    No Show: 2  Cancelled Appts:6     Medical Diagnosis: Unspecified fracture of fifth metacarpal bone, right hand, initial encounter for open fracture [S62.306B]  Displaced fracture of base of fourth metacarpal bone, right hand, initial encounter for open fracture [S62.314B] Displaced fx. 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 \"      Learning/Language barrier: no     HEP/Strategies/Orthosis Compliance: Patient verbally confirmed compliant with HEP's Patient demo understanding. Patient with good follow through. Restrictions: none       OBJECTIVE EXAMINATION       TREATMENT     Focus of treatment was on the following:   strengthening R hand and improving functional coordination      MFR/Manual:   Soft tissue mobilization performed on palm and ulnar side of hand with hand elevated.   Cupping over all scars on dorsal surface of hand using various size cups to lift scar tissue. Followed with deep pressure massage thenar web space while holding thumb in radial abduction. Exercises/Activities RUE:  Wrist roll ups with 4 lb. weight suspended from dowel bar, 20 reps x 2  15 lb. resistance flex bar:  grasp and flex x 15            grasp and twist x 15, CW & CCW            grasp and flex w/ forearm supinated x 15  Mod resistance blue powerweb:  fingertip grasp and pull x 10           Push w/ flexed fist x 10  Extended arm weight bearing with fingers over edge of table, performing isolated digit ext x 5 each digit. Grasp and turn various resistance crank handle with forearm neutral, elbow flexed 90*, 15 reps CW/CCW    Patient Education/HEP:   Continue recommended HEP/activities. ASSESSMENT       Assessment: Pt making good  progress towards goals. Pt. reports fatigue and soreness, no pain. Pt. experiencing \"feeling of tendon popping\" in middle digit during twisting motions. Pt. improving as now able to paint walls with R hand using standard paint brush. Pt. motivated to improve. Scar mobility continues to improve dorsal hand with less restrictions noted during cupping. Post Treatment Pain: Pt denies pain    Patient's Activity Tolerance: good                 GOALS         Long Term Goals  Time Frame for Long Term Goals : 16-24 visit(6-8 weeks)  Long Term Goal 1: Patient will report pain 2 or less during functional activities  Long Term Goal 2: Patient  will be indep with all recommended HEP's, adaptive strategies, and adaptive techniques. Long Term Goal 3: Patient will report decreased frequency of numbness/tingling in RUE.   Long Term Goal 4: Patient will increase AROM of RUE wrist and hand to [facility] to increase performance with I/ADL's  Long Term Goal 5: Patient  will increase RUE strength from current to [facility] to increase performance with I/ADL's  Long Term Goal 6: Patient  will increase R  strength from current to 80% norm for age and gender to increase performance with I/ADL's. Long Term Goal 7: Patient  will increase R pinch strength from current to 80% norm for age and gender  to increase performance with I/ADLs  Long Term Goal 8: Patient  will increase dexterity in R hand as observed by 9 hole peg test [facility] to increase performance with I/ADL's. Long Term Goal 9: Patient  will decrease edema in RUE to increase ROM and performance with I/ADL's. Long Term Goal 10: Pt. will utilize positioning and adaptive techniques to perform light weight work related activities indep. with RUE. TREATMENT PLAN   Plan changed to 1x week x 8 weeks for strengthening and endurance RUE prior to surgery to release scar adhesions of hand.            Electronically signed by YOBANI Adair/L  on 1/5/2023 at 10:09 AM

## 2023-01-11 ENCOUNTER — HOSPITAL ENCOUNTER (OUTPATIENT)
Dept: OCCUPATIONAL THERAPY | Age: 48
Setting detail: THERAPIES SERIES
Discharge: HOME | End: 2023-01-11
Payer: COMMERCIAL

## 2023-01-11 PROCEDURE — 97530 THERAPEUTIC ACTIVITIES: CPT

## 2023-01-11 PROCEDURE — 97140 MANUAL THERAPY 1/> REGIONS: CPT

## 2023-01-11 NOTE — PROGRESS NOTES
[facility]     Occupational Therapy: Daily Note   Patient: Tru Hunt (61 y.o. male)   Date:   Plan of Care Certification Period:  23   :  1975  MRN: [id number]  CSN: [id number]   Insurance: Payor: [facility]  [address] / Plan: 809 [facility] [address] / Product Type: *No Product type* /   Insurance ID: [id number] - (Medicaid Managed) Secondary Insurance (if applicable):    Referring Physician: Rosey Whitaker MD     PCP: Debbie Murray MD Visits to Date: Total # of Visits to Date: 28   Progress note:Progress Note Counter: 28  Visits Approved: 27 (30 more approved calendar year)    No Show: 2  Cancelled Appts:6     Medical Diagnosis: Unspecified fracture of fifth metacarpal bone, right hand, initial encounter for open fracture [S62.306B]  Displaced fracture of base of fourth metacarpal bone, right hand, initial encounter for open fracture [S62.314B] Displaced fx. 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 \"     Learning/Language barrier: no     HEP/Strategies/Orthosis Compliance: Patient verbally confirmed compliant with HEP's Patient demo understanding. Restrictions: none         OBJECTIVE EXAMINATION      Average of  Three tries     RIGHT              CURRENT      Right                 (on 10/11/22)      Right              Norm      Wilton lb. 36 14 103   PALMAR  Pinch  Lb. 14 4 19   LATERAL  Pinch  Lb. 14 4 20.5    Measurements taken after therapy treatment.       TREATMENT     Focus of treatment was on the following: strengthening R wrist and hand, increasing fine motor control      MFR/Manual:   Cupping completed across dorsal scar with good results. Measured RUE as above. Exercises/Activities RUE:  Fine motor control  tip to tip pinch removing velcro resistance cubes from board, 32 cubes x 2  Blue max resistance power web:  and sustain pull x 15 reps         individual digit flexion and pull, 10 reps each  Strengthening with 25 lb. flex bar:  grasp and flex w/ 5 sec hold x 15 reps              grasp and flex w/ forearm supinated x 15 reps  Oscillating motions shaking 25 lb. flex bar all directions, 60 sec x 5 trials  Wrist flex/ext strengthening, rolling up 4 lb. weight suspended from dowel bar, 15 reps  Isolated active finger extension with blocking at MP joints, 5 reps each digit. Patient Education/HEP:   Continue recommended HEP/activities. ASSESSMENT       Assessment: Pt making good  progress towards goals. Pt.'s  improving with improved endurance and fine motor control to manipulate small items. Pt.'s ring finger scar adhesion continues to limit function of middle and ring digits. Dorsal scar mobility improving. Pt. continues with soft tissue edema in palm. Pt. experiences pulling discomfort with isolated resistive flexion of middle digit. Pt. motivated to regain full function of hand with active participation in HEPs. Post Treatment Pain: Pt denies pain    Patient's Activity Tolerance: good                 GOALS         Long Term Goals  Time Frame for Long Term Goals : 16-24 visit(6-8 weeks)  Long Term Goal 1: Patient will report pain 2 or less during functional activities  Long Term Goal 2: Patient  will be indep with all recommended HEP's, adaptive strategies, and adaptive techniques. Long Term Goal 3: Patient will report decreased frequency of numbness/tingling in RUE.   Long Term Goal 4: Patient will increase AROM of RUE wrist and hand to [facility] to increase performance with I/ADL's  Long Term Goal 5: Patient  will increase RUE strength from current to [facility] [place] to increase performance with I/ADL's  Long Term Goal 6: Patient  will increase R  strength from current to 80% norm for age and gender to increase performance with I/ADL's. Long Term Goal 7: Patient  will increase R pinch strength from current to 80% norm for age and gender  to increase performance with I/ADLs  Long Term Goal 8: Patient  will increase dexterity in R hand as observed by 9 hole peg test by performing [facility] [place] for age and gender to increase performance with I/ADL's. Long Term Goal 9: Patient  will decrease edema in RUE to increase ROM and performance with I/ADL's. Long Term Goal 10: Pt. will utilize positioning and adaptive techniques to perform light weight work related activities indep. with RUE. TREATMENT PLAN   Plan to see pt. 1 x week per physician's order until surgery for strengthening and fine motor control.              Electronically signed by ANIL Serrano  on 1/11/2023 at 10:02 AM

## 2023-01-18 ENCOUNTER — HOSPITAL ENCOUNTER (OUTPATIENT)
Dept: OCCUPATIONAL THERAPY | Age: 48
Setting detail: THERAPIES SERIES
Discharge: HOME | End: 2023-01-18
Payer: COMMERCIAL

## 2023-01-18 NOTE — PROGRESS NOTES
Therapy                            Cancellation/No-show Note    Date: 2023  Patient Name: Jacob Wilkerson    : 1975  (50 y.o.)     MRN: [id number]    Account #: [de-identified]    No Show: 2  Canceled Appointment: 7    Comments: For today's appointment patient:  [x]  Cancelled  []  Rescheduled appointment  []  No-show   []  Called pt to remind of next appointment     Reason given by patient:  [x]  Patient ill  []  Conflicting appointment  []  No transportation    []  Conflict with work  []  No reason given  []  Other:      [x] Pt has future appointments scheduled, no follow up needed  [] Pt requests to be on hold. Reason:   If > 2 weeks please discuss with therapist.  [] Therapist to call pt for follow up     Signature:  ANIL Ziegler 2023 9:24 AM

## 2023-01-25 ENCOUNTER — HOSPITAL ENCOUNTER (OUTPATIENT)
Dept: OCCUPATIONAL THERAPY | Age: 48
Setting detail: THERAPIES SERIES
Discharge: HOME | End: 2023-01-25
Payer: COMMERCIAL

## 2023-01-25 NOTE — PROGRESS NOTES
Therapy                            Cancellation/No-show Note    Date: 2023  Patient Name: Darron Tobias    : 1975  (50 y.o.)     MRN: [id number]    Account #: [de-identified]            Comments: For today's appointment patient:  [x]  Cancelled  []  Rescheduled appointment  []  No-show   []  Called pt to remind of next appointment     Reason given by patient:    [x]  Patient ill - Patient reported he is attempting to get into the dr today because he feels as though he has a sinus infection    []  Conflicting appointment  []  No transportation    []  Conflict with work  []  No reason given  []  Other:      [x] Pt has future appointments scheduled, no follow up needed  [] Pt requests to be on hold.     Reason:   If > 2 weeks please discuss with therapist.  [] Therapist to call pt for follow up     Signature: ANIL Gordon 2023 8:59 AM

## 2023-02-01 ENCOUNTER — HOSPITAL ENCOUNTER (OUTPATIENT)
Dept: OCCUPATIONAL THERAPY | Age: 48
Setting detail: THERAPIES SERIES
Discharge: HOME | End: 2023-02-01

## 2023-02-08 ENCOUNTER — HOSPITAL ENCOUNTER (OUTPATIENT)
Dept: OCCUPATIONAL THERAPY | Age: 48
Setting detail: THERAPIES SERIES
Discharge: HOME | End: 2023-02-08
Payer: COMMERCIAL

## 2023-02-08 PROCEDURE — 97530 THERAPEUTIC ACTIVITIES: CPT

## 2023-02-08 PROCEDURE — 97140 MANUAL THERAPY 1/> REGIONS: CPT

## 2023-02-08 NOTE — PROGRESS NOTES
[facility]     Occupational Therapy: Daily Note   Patient: Izzy Arevalo (17 y.o. male)   Date:   Plan of Care Certification Period:  23   :  1975  MRN: [id number]  CSN: [id number]   Insurance: Payor: [address] [facility]  [address] / Plan: [address]09 [facility]  [address] / Product Type: *No Product type* /   Insurance ID: [id number] - (Medicaid Managed) Secondary Insurance (if applicable):    Referring Physician: Fady Gamez MD     PCP: Delaney Estrada MD Visits to Date: Total # of Visits to Date: 39   Progress note:Progress Note Counter: 39  Visits Approved: 27 (30 more approved calendar year)    No Show: 2  Cancelled Appts:9     Medical Diagnosis: Unspecified fracture of fifth metacarpal bone, right hand, initial encounter for open fracture [S62.306B]  Displaced fracture of base of fourth metacarpal bone, right hand, initial encounter for open fracture [S62.314B] Displaced fx. D4 & D5 RUE        Therapy Time     Time in 907   Time out 1000   Total treatment minutes 53   Total time code minutes  53 Minutes        OT Manual therapy 8 minutes for 1 unit(s), CPT 56841  OT Therapeutic activities 45 minutes for 3 unit(s), CPT 97431       SUBJECTIVE EXAMINATION     Patient's date of birth confirmed: Yes     Pain Level:   Pt denies pain    Patient Comments:   \"I have been so sick the past few weeks, I am finally better. I feel my scar is better, but still stops my finger from flexing. \"     Learning/Language barrier: no     HEP/Strategies/Orthosis Compliance: Patient verbally confirmed compliant with HEP's Patient demo understanding. Patient with good. follow through     Restrictions: none           OBJECTIVE EXAMINATION         TREATMENT     Focus of treatment was on the following:   Strengthening and endurance RUE for work tasks.       MFR/Manual:   Using roller ball massager, performed deep scar mobilization of web space and base of ring digit with good tolerance. Exercises/Activities: In hand manipulation picking up and holding multiple small items in hand. Attempting to place them on table with thumb and index digit. Began BTE work simulation program with RUE:  Tool 181, ladder:  90 sec each,14 torque:  Rope pull down          handle grasp pull up                      handle grasp pull down  Tool 131, steering wheel: 90 sec, 13 torque: turn R & L  Tool 701, 1st position crank:  120 sec, 25 torque:  Crank overhead                  Crank at shoulder height  Tool 601,  120 sec, 13 torque:  supination/pronation rapidly  Tool 162, 60 sec, 50 torque:  Repetitive       3 pt. pinch   Oscillating motions using BOING with forward/back motions and side to side motions for 2 x 30 sec    Patient Education/HEP:   Continue recommended HEP/activities. ASSESSMENT       Assessment: Pt tolerated treatment well. Post Treatment Pain: Pt denies pain    Patient's Activity Tolerance: good                 GOALS         Long Term Goals  Time Frame for Long Term Goals : 1x week for 6-6 weeks  Long Term Goal 1: Patient will report pain 2 or less during functional activities  Long Term Goal 2: Patient  will be indep with all recommended HEP's, adaptive strategies, and adaptive techniques. Long Term Goal 3: Patient will report decreased frequency of numbness/tingling in RUE. Long Term Goal 4: Patient will increase AROM of R wrist and hand to obtain 85-90% composite fist to hold a coffee cup. Long Term Goal 5: Patient  will increase RUE strength from current to [facility] [place] to increase performance with I/ADL's  Long Term Goal 6: Pt. will increase R  to 90% norm for age and gender to improve performance with work tasks.   Long Term Goal 7: Patient  will increase R pinch strength from current to 80% norm for age and gender  to increase performance with I/ADLs  Long Term Goal 8: Patient  will increase dexterity in R hand as observed by 9 hole peg test by performing [facility] for age and gender to increase performance with I/ADL's. Long Term Goal 9: Patient  will decrease edema in RUE to increase ROM and performance with I/ADL's. Long Term Goal 10: Pt. will be able to utilize power drill/screwdriver with R hand using proper positioning. TREATMENT PLAN   Continue 1 x week until surgery to release scar.            Electronically signed by YOBANI Arshad/L  on 2/8/2023 at 12:32 PM

## 2023-02-15 ENCOUNTER — HOSPITAL ENCOUNTER (OUTPATIENT)
Dept: OCCUPATIONAL THERAPY | Age: 48
Setting detail: THERAPIES SERIES
Discharge: HOME | End: 2023-02-15
Payer: COMMERCIAL

## 2023-02-15 NOTE — PROGRESS NOTES
Therapy                            Cancellation/No-show Note    Date: 2/15/2023  Patient Name: Nova Lozano    : 1975  (50 y.o.)     MRN: [id number]    Account #: [de-identified]    No Show: 2  Canceled Appointment: 10    Comments: For today's appointment patient:  [x]  Cancelled  []  Rescheduled appointment  []  No-show   []  Called pt to remind of next appointment     Reason given by patient:  []  Patient ill  [x]  Conflicting appointment  []  No transportation    []  Conflict with work  []  No reason given  []  Other:      [x] Pt has future appointments scheduled, no follow up needed  [] Pt requests to be on hold. Reason:   If > 2 weeks please discuss with therapist.  [] Therapist to call pt for follow up     Signature:  YOBANI Foster/L 2/15/2023 9:25 AM

## 2023-02-22 ENCOUNTER — HOSPITAL ENCOUNTER (OUTPATIENT)
Dept: OCCUPATIONAL THERAPY | Age: 48
Setting detail: THERAPIES SERIES
Discharge: HOME | End: 2023-02-22
Payer: COMMERCIAL

## 2023-02-22 PROCEDURE — 97140 MANUAL THERAPY 1/> REGIONS: CPT

## 2023-02-22 PROCEDURE — 97530 THERAPEUTIC ACTIVITIES: CPT

## 2023-02-22 NOTE — PROGRESS NOTES
OCCUPATIONAL THERAPY DISCHARGE SUMMARY     [address] [place] [address] [place] [address] [facility]  Ph: [phone]   Fax: [phone]      Date: 2023    To: Todd Roper MD From: Yonathan Blevins, OTR/L   Patient: Omar Marie : 1975   MRN: [id number] Diagnosis: Unspecified fracture of fifth metacarpal bone, right hand, initial encounter for open fracture [S62.306B]  Displaced fracture of base of fourth metacarpal bone, right hand, initial encounter for open fracture [S62.314B] Diagnosis: Displaced fx. D4 & D5 RUE     Date of eval: 2022    Visit Information:   Onset Date: 22  OT Insurance Information: [place] Medicaid  Total # of Visits Approved: 27 (30 more approved calendar year)  Total # of Visits to Date: 40  Certification Period Expiration Date: 23  No Show: 2  Progress Note Due Date: 22  Canceled Appointment: 10  Progress Note Counter: 37                              Assessment:      Long Term Goals Current/Discharge status  Met   Long Term Goal 1: Patient will report pain 2 or less during functional activities Pt. has no pain issues with any task. [x] Met  [] Partially Met  [] Not Met   Long Term Goal 2: Patient  will be indep with all recommended HEP's, adaptive strategies, and adaptive techniques. Pt. demo excellent follow through with all HEPs. [x] Met  [] Partially Met  [] Not Met   Long Term Goal 3: Patient will report decreased frequency of numbness/tingling in RUE. Pt. reports no numbness or tingling in RUE. [x] Met  [] Partially Met  [] Not Met   Long Term Goal 4: Patient will increase AROM of R wrist and hand to obtain 85-90% composite fist to hold a coffee cup. Pt. is able to obtain gross functional fist to perform all  and lifting tasks without difficulty. See chart below.  [] Met  [x] Partially Met  [] Not Met   Long Term Goal 5: Patient  will increase RUE strength from current to [facility] to increase performance with I/ADL's Pt.'s RUE strength is 5/5 throughout. Pt. lacks strength of  only. See chart below. [x] Met  [] Partially Met  [] Not Met   Long Term Goal 6: Pt. will increase R  to 90% norm for age and gender to improve performance with work tasks. See chart below. [] Met  [x] Partially Met  [] Not Met   Long Term Goal 7: Patient  will increase R pinch strength from current to 80% norm for age and gender  to increase performance with I/ADLs See chart below. [x] Met  [] Partially Met  [] Not Met     Pt. is independent with all ADL and work tasks without pain. Pt. has some difficulty operating power tools. AROM: WFL R hand for gross grasp and prehension. Pt. lacks active flexion/extension of D3 & D4 due to scar tissue adhesions. ALL other AROM BUEs WNL. PROM all digits WFL, except ring finger extension due to mod thickened scar tissue on volar surface of digit. Average of  Three tries     RIGHT              CURRENT      Right                   (on 1/11/23) Right   (on 11/11/22)      Right              Norm      Wilton lb. 48 36 14 103   PALMAR  Pinch  Lb. 14 14 4 19   LATERAL  Pinch  Lb. 16 14 4 20.5      Sensation of all digits normal, no numbness or tingling reported. No edema present. Pt. has well healed scar dorsal hand and web space. Functional assessment used: Upper Extremity Functional Index  Score on eval:  11/80=13.75%  Score at d/c:  75/80=93.75%    Plan: D/C from OT to continue HEPs for continued progress. Pt. on vacation prior to scheduled ortho surgery in March for release of scar tissue R hand, thus requesting hold/D/C. Pt. to call to resume therapy if recommended post surgery. Thank you for referral of this patient. Electronically signed by:   YOBANI Tilley/L 2/22/2023 3:54 PM

## 2023-02-22 NOTE — PROGRESS NOTES
[facility]     Occupational Therapy: Daily Note   Patient: Tru Hunt (38 y.o. male)   Date:   Plan of Care Certification Period:  23   :  1975  MRN: [id number]  CSN: [id number]   Insurance: Payor: [facility]  [address] / Plan: 9 [facility] [address] / Product Type: *No Product type* /   Insurance ID: [id number] - (Medicaid Managed) Secondary Insurance (if applicable):    Referring Physician: Rosey Whitaker MD     PCP: Debbie Murray MD Visits to Date: Total # of Visits to Date: 40   Progress note:Progress Note Counter: 40  Visits Approved: 27 (30 more approved calendar year)    No Show: 2  Cancelled Appts:10     Medical Diagnosis: Unspecified fracture of fifth metacarpal bone, right hand, initial encounter for open fracture [S62.306B]  Displaced fracture of base of fourth metacarpal bone, right hand, initial encounter for open fracture [S62.314B] Displaced fx. 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 \"     Learning/Language barrier: no       HEP/Strategies/Orthosis Compliance: Patient verbally confirmed compliant with HEP's        Restrictions: none         OBJECTIVE EXAMINATION   Pt. is independent with all ADL and work tasks without pain. Pt. has some difficulty operating power tools. AROM: WFL R hand for gross grasp and prehension. Pt. lacks active flexion/extension of D3 & D4 due to scar tissue adhesions. ALL other AROM BUEs WNL. PROM all digits WFL, except ring finger due to mod thickened scar tissue. Average of  Three tries     RIGHT              CURRENT      Right                   (on 1/11/23) Right   (on 11/11/22)      Right              Norm      Wilton lb. 48 36 14 103   PALMAR  Pinch  Lb. 14 14 4 19   LATERAL  Pinch  Lb. 16 14 4 20.5      Sensation of all digits normal, no numbness or tingling reported. No edema present. Pt. has well healed scar dorsal hand and web space. TREATMENT     Focus of treatment was on the following:   strengthening RUE and assess for progress toward goals     Measured RUE as above. MFR/Manual:   Performed deep soft tissue mobilization and cross friction massage to dorsal R hand and thenar web space to alleviate scar adhesions. Followed with cupping of scar as able along 2nd metacarpal and dorsal scar. Exercises/Activities RUE:  Completed BTE work conditioning tasks:     tool 131(small steering wheel), 13 torque:  2 min CW/CCW     tool 701(hand crank), 25 torque:  2 min CW/CCW     tool 162(hand gripper), 50 torque:  2 min gross        2 min 3 pt. pinch  Grasping, lifting and swinging arm holding 4 lb. plyoball x 15 reps  Wrist flex/ext holding 3 lb. plyoball x 15 reps  Stretching exercises for thenar web space and extension of ring finger. Patient Education/HEP:    Web space stretching HEP, Continue recommended HEP/activities. ASSESSMENT       Assessment: Pt making good  progress towards goals. Pt.'s AROM and general strength of RUE WFL w/ restrictions in D3 & 4 finger flexion/extension due to scar tissue. Pt.'s  strength is 47% norm and pinch is 79% norm for age and gender. Pt. is performing all work tasks except operating power tools. Pt. has met majority of goals. Pt. questioning need for continued therapy at this time due to upcoming surgery and vacation planned prior. Pt. demo excellent follow through with HEPs and progressing well in all areas of function RUE. Agree to D/C pt. at this time and to resume O.T. post surgery when referred by physician. Pt. agrees with plan. Post Treatment Pain: Pt denies pain    Patient's Activity Tolerance: good             GOALS         Long Term Goals  Time Frame for Long Term Goals : 1x week for 6-6 weeks  Long Term Goal 1: Patient will report pain 2 or less during functional activities  Long Term Goal 2: Patient  will be indep with all recommended HEP's, adaptive strategies, and adaptive techniques. Long Term Goal 3: Patient will report decreased frequency of numbness/tingling in RUE. Long Term Goal 4: Patient will increase AROM of R wrist and hand to obtain 85-90% composite fist to hold a coffee cup. Long Term Goal 5: Patient  will increase RUE strength from current to [facility] [place] to increase performance with I/ADL's  Long Term Goal 6: Pt. will increase R  to 90% norm for age and gender to improve performance with work tasks. Long Term Goal 7: Patient  will increase R pinch strength from current to 80% norm for age and gender  to increase performance with I/ADLs  Long Term Goal 8: Patient  will increase dexterity in R hand as observed by 9 hole peg test by performing [place] [place] for age and gender to increase performance with I/ADL's. Long Term Goal 9: Patient  will decrease edema in RUE to increase ROM and performance with I/ADL's. TREATMENT PLAN   D/C from OP OT at this time for pt. to continue with HEPs for progress pending hand surgery. Pt. to call dept. to schedule when appropriate post surgery. Report to be sent to physician.             Electronically signed by ANIL Padilla  on 2/22/2023 at 11:50 AM

## 2023-02-24 ENCOUNTER — PREP FOR PROCEDURE (OUTPATIENT)
Dept: ORTHOPEDIC SURGERY | Age: 48
End: 2023-02-24

## 2023-02-24 RX ORDER — SODIUM CHLORIDE 9 MG/ML
INJECTION, SOLUTION INTRAVENOUS CONTINUOUS
Status: CANCELLED | OUTPATIENT
Start: 2023-02-24

## 2023-02-24 RX ORDER — SODIUM CHLORIDE 9 MG/ML
INJECTION, SOLUTION INTRAVENOUS PRN
Status: CANCELLED | OUTPATIENT
Start: 2023-02-24

## 2023-02-24 RX ORDER — SODIUM CHLORIDE 0.9 % (FLUSH) 0.9 %
5-40 SYRINGE (ML) INJECTION EVERY 12 HOURS SCHEDULED
Status: CANCELLED | OUTPATIENT
Start: 2023-02-24

## 2023-02-24 RX ORDER — SODIUM CHLORIDE 0.9 % (FLUSH) 0.9 %
5-40 SYRINGE (ML) INJECTION PRN
Status: CANCELLED | OUTPATIENT
Start: 2023-02-24

## 2023-03-01 ENCOUNTER — APPOINTMENT (OUTPATIENT)
Dept: OCCUPATIONAL THERAPY | Age: 48
End: 2023-03-01
Payer: COMMERCIAL

## 2023-03-13 NOTE — DISCHARGE INSTRUCTIONS
DISCHARGE INSTRUCTIONS TO HOME FOLLOWING SURGERY    You need to start therapy within the next day or two. Our office is working to set this up. Please contact the office with instructions! ACTIVITY INSTRUCTIONS:    Elevate extremity to help reduce swelling. Use Purple Pillow for elevation of hand/arm   Use sling as needed. No heavy lifting, pushing, pulling or strenuous activity    WOUND/DRESSING INSTRUCTIONS:  Always ensure you and your care giver clean hands before and after caring for the wound. Keep the dressing, cast, or splint clean and dry until seen in therapy. OK to shower- Keep your dressing dry. Can ice surgical region to help reduce swelling, Do not apply ice to fingers or toes       MEDICATION INSTRUCTIONS:  Take pain medicine as directed. When taking pain medications, you may experience dizziness or drowsiness. Do not drink alcohol or drive when taking these medications. Do not take any other medication containing acetaminophen (Tylenol) while taking the prescribed pain medication. Ibuprofen, Aleve, Motrin, or Naproxen are okay but should not be taken on an empty stomach. *Watch for these significant complications:  Call physician if these or any other problems occur:  Fever over 101°, redness, swelling or warmth at the operative site  Unrelieved nausea    Foul smelling or cloudy drainage at the operative site   Unrelieved pain  Breathing issues such as shortness of breath or difficulty breathing    Blood soaked dressing. (Some oozing may be normal)     Numb, pale, blue, cold or tingling extremity       Make an appointment to be seen 8-10 days after surgery  FOLLOW-UP CARE:    Dr. Cameron Palacio.  Xavier Garrido 76  [facility] [facility] [place]  [phone]

## 2023-03-13 NOTE — PROGRESS NOTES
Jesús PRE-ADMISSION TESTING INSTRUCTIONS    The Preadmission Testing patient is instructed accordingly using the following criteria (check applicable):    ARRIVAL INSTRUCTIONS:  [x] Parking the day of Surgery is located in the Main Entrance lot. Upon entering the door, make an immediate right to the surgery reception desk    [x] Bring photo ID and insurance card    [] Bring in a copy of Living will or Durable Power of  papers. [x] Please be sure to arrange for responsible adult to provide transportation to and from the hospital    [x] Please arrange for responsible adult to be with you for the 24 hour period post procedure due to having anesthesia    [x] If you awake am of surgery not feeling well or have temperature >100 please call [phone]    GENERAL INSTRUCTIONS:    [x] Nothing by mouth after midnight, including gum, candy, mints or water    [x] You may brush your teeth, but do not swallow any water    [] Take medications as instructed with 1-2 oz of water    [] Stop herbal supplements and vitamins 5 days prior to procedure    [] Follow preop dosing of blood thinners per physician instructions    [] Take 1/2 dose of evening insulin, but no insulin after midnight    [] No oral diabetic medications after midnight    [] If diabetic and have low blood sugar or feel symptomatic, take 1-2oz apple juice only    [] Bring inhalers day of surgery    [] Bring C-PAP/ Bi-Pap day of surgery    [] Bring urine specimen day of surgery    [x] Shower or bath with soap, lather and rinse well, AM of Surgery, no lotion, powders or creams to surgical site    [] Follow bowel prep as instructed per surgeon    [x] No tobacco products within 24 hours of surgery     [x] No alcohol or illegal drug use within 24 hours of surgery.     [x] Jewelry, body piercing's, eyeglasses, contact lenses and dentures are not permitted into surgery (bring cases)      [] Please do not wear any nail polish, make up or hair products on the day of surgery    [x] You can expect a call the business day prior to procedure to notify you if your arrival time changes    [x] If you receive a survey after surgery we would greatly appreciate your comments    [] Parent/guardian of a minor must accompany their child and remain on the premises  the entire time they are under our care     [] Pediatric patients may bring favorite toy, blanket or comfort item with them    [] A caregiver or family member must remain with the patient during their stay if they are mentally handicapped, have dementia, disoriented or unable to use a call light or would be a safety concern if left unattended    [x] Please notify surgeon if you develop any illness between now and time of surgery (cold, cough, sore throat, fever, nausea, vomiting) or any signs of infections  including skin, wounds, and dental.    [x]  The Outpatient Pharmacy is available to fill your prescription here on your day of surgery, ask your preop nurse for details    [] Other instructions    EDUCATIONAL MATERIALS PROVIDED:    [] PAT Preoperative Education Packet/Booklet     [] Medication List    [] Transfusion bracelet applied with instructions    [] Shower with soap, lather and rinse well, and use CHG wipes provided the evening before surgery as instructed    [] Incentive spirometer with instructions

## 2023-03-15 ENCOUNTER — ANESTHESIA (OUTPATIENT)
Dept: OPERATING ROOM | Age: 48
DRG: 952 | End: 2023-03-15
Payer: COMMERCIAL

## 2023-03-15 ENCOUNTER — APPOINTMENT (OUTPATIENT)
Dept: GENERAL RADIOLOGY | Age: 48
DRG: 952 | End: 2023-03-15
Attending: ORTHOPAEDIC SURGERY
Payer: COMMERCIAL

## 2023-03-15 ENCOUNTER — HOSPITAL ENCOUNTER (INPATIENT)
Age: 48
LOS: 1 days | Discharge: HOME | DRG: 952 | End: 2023-03-15
Attending: ORTHOPAEDIC SURGERY | Admitting: INTERNAL MEDICINE
Payer: COMMERCIAL

## 2023-03-15 ENCOUNTER — ANESTHESIA EVENT (OUTPATIENT)
Dept: OPERATING ROOM | Age: 48
DRG: 952 | End: 2023-03-15
Payer: COMMERCIAL

## 2023-03-15 VITALS
OXYGEN SATURATION: 96 % | RESPIRATION RATE: 16 BRPM | TEMPERATURE: 99 F | BODY MASS INDEX: 37.8 KG/M2 | HEIGHT: 71 IN | WEIGHT: 270 LBS | DIASTOLIC BLOOD PRESSURE: 76 MMHG | HEART RATE: 113 BPM | SYSTOLIC BLOOD PRESSURE: 119 MMHG

## 2023-03-15 DIAGNOSIS — S62.306B OPEN DISPLACED FRACTURE OF FIFTH METACARPAL BONE OF RIGHT HAND, UNSPECIFIED PORTION OF METACARPAL, INITIAL ENCOUNTER: Primary | ICD-10-CM

## 2023-03-15 DIAGNOSIS — L90.5 CONTRACTURE OF JOINT OF FINGER DUE TO SCAR: ICD-10-CM

## 2023-03-15 DIAGNOSIS — S62.314B: ICD-10-CM

## 2023-03-15 DIAGNOSIS — M24.549 CONTRACTURE OF JOINT OF FINGER DUE TO SCAR: ICD-10-CM

## 2023-03-15 DIAGNOSIS — G89.18 POST-OPERATIVE PAIN: Primary | ICD-10-CM

## 2023-03-15 PROBLEM — I10 ACCELERATED HYPERTENSION: Status: ACTIVE | Noted: 2023-03-15

## 2023-03-15 LAB
ALBUMIN SERPL-MCNC: 4 G/DL (ref 3.5–5.2)
ALP SERPL-CCNC: 95 U/L (ref 40–129)
ALT SERPL-CCNC: 65 U/L (ref 0–40)
ANION GAP SERPL CALCULATED.3IONS-SCNC: 12 MMOL/L (ref 7–16)
AST SERPL-CCNC: 37 U/L (ref 0–39)
BILIRUB SERPL-MCNC: 0.3 MG/DL (ref 0–1.2)
BUN SERPL-MCNC: 7 MG/DL (ref 6–20)
CALCIUM SERPL-MCNC: 9.1 MG/DL (ref 8.6–10.2)
CHLORIDE SERPL-SCNC: 105 MMOL/L (ref 98–107)
CO2 SERPL-SCNC: 21 MMOL/L (ref 22–29)
CREAT SERPL-MCNC: 0.9 MG/DL (ref 0.7–1.2)
EKG ATRIAL RATE: 78 BPM
EKG P AXIS: 47 DEGREES
EKG P-R INTERVAL: 170 MS
EKG Q-T INTERVAL: 392 MS
EKG QRS DURATION: 90 MS
EKG QTC CALCULATION (BAZETT): 446 MS
EKG R AXIS: 57 DEGREES
EKG T AXIS: 30 DEGREES
EKG VENTRICULAR RATE: 78 BPM
ERYTHROCYTE [DISTWIDTH] IN BLOOD BY AUTOMATED COUNT: 12.6 FL (ref 11.5–15)
GLUCOSE SERPL-MCNC: 118 MG/DL (ref 74–99)
HCT VFR BLD AUTO: 46.7 % (ref 37–54)
HGB BLD-MCNC: 15.2 G/DL (ref 12.5–16.5)
LV EF: 65 %
LVEF MODALITY: NORMAL
MCH RBC QN AUTO: 30.4 PG (ref 26–35)
MCHC RBC AUTO-ENTMCNC: 32.5 % (ref 32–34.5)
MCV RBC AUTO: 93.4 FL (ref 80–99.9)
PLATELET # BLD AUTO: 306 E9/L (ref 130–450)
PMV BLD AUTO: 9.2 FL (ref 7–12)
POTASSIUM SERPL-SCNC: 3.8 MMOL/L (ref 3.5–5)
PROT SERPL-MCNC: 7.2 G/DL (ref 6.4–8.3)
RBC # BLD AUTO: 5 E12/L (ref 3.8–5.8)
REASON FOR REJECTION: NORMAL
REJECTED TEST: NORMAL
SODIUM SERPL-SCNC: 138 MMOL/L (ref 132–146)
TROPONIN, HIGH SENSITIVITY: 9 NG/L (ref 0–11)
WBC # BLD: 15.6 E9/L (ref 4.5–11.5)

## 2023-03-15 PROCEDURE — 1200000000 HC SEMI PRIVATE

## 2023-03-15 PROCEDURE — 6360000002 HC RX W HCPCS

## 2023-03-15 PROCEDURE — 0HNFXZZ RELEASE RIGHT HAND SKIN, EXTERNAL APPROACH: ICD-10-PCS | Performed by: ORTHOPAEDIC SURGERY

## 2023-03-15 PROCEDURE — 2580000003 HC RX 258: Performed by: PHYSICIAN ASSISTANT

## 2023-03-15 PROCEDURE — 93005 ELECTROCARDIOGRAM TRACING: CPT | Performed by: ANESTHESIOLOGY

## 2023-03-15 PROCEDURE — 2500000003 HC RX 250 WO HCPCS: Performed by: ANESTHESIOLOGY

## 2023-03-15 PROCEDURE — 80053 COMPREHEN METABOLIC PANEL: CPT

## 2023-03-15 PROCEDURE — 94664 DEMO&/EVAL PT USE INHALER: CPT

## 2023-03-15 PROCEDURE — 6360000002 HC RX W HCPCS: Performed by: ANESTHESIOLOGY

## 2023-03-15 PROCEDURE — 2709999900 HC NON-CHARGEABLE SUPPLY: Performed by: ORTHOPAEDIC SURGERY

## 2023-03-15 PROCEDURE — 3600000002 HC SURGERY LEVEL 2 BASE: Performed by: ORTHOPAEDIC SURGERY

## 2023-03-15 PROCEDURE — G0378 HOSPITAL OBSERVATION PER HR: HCPCS

## 2023-03-15 PROCEDURE — 6370000000 HC RX 637 (ALT 250 FOR IP): Performed by: INTERNAL MEDICINE

## 2023-03-15 PROCEDURE — 93005 ELECTROCARDIOGRAM TRACING: CPT | Performed by: INTERNAL MEDICINE

## 2023-03-15 PROCEDURE — 36415 COLL VENOUS BLD VENIPUNCTURE: CPT

## 2023-03-15 PROCEDURE — 84484 ASSAY OF TROPONIN QUANT: CPT

## 2023-03-15 PROCEDURE — 6360000002 HC RX W HCPCS: Performed by: PHYSICIAN ASSISTANT

## 2023-03-15 PROCEDURE — 2500000003 HC RX 250 WO HCPCS: Performed by: ORTHOPAEDIC SURGERY

## 2023-03-15 PROCEDURE — 2500000003 HC RX 250 WO HCPCS: Performed by: INTERNAL MEDICINE

## 2023-03-15 PROCEDURE — 7100000011 HC PHASE II RECOVERY - ADDTL 15 MIN: Performed by: ORTHOPAEDIC SURGERY

## 2023-03-15 PROCEDURE — 6370000000 HC RX 637 (ALT 250 FOR IP): Performed by: ANESTHESIOLOGY

## 2023-03-15 PROCEDURE — 7100000000 HC PACU RECOVERY - FIRST 15 MIN: Performed by: ORTHOPAEDIC SURGERY

## 2023-03-15 PROCEDURE — 3700000000 HC ANESTHESIA ATTENDED CARE: Performed by: ORTHOPAEDIC SURGERY

## 2023-03-15 PROCEDURE — 85027 COMPLETE CBC AUTOMATED: CPT

## 2023-03-15 PROCEDURE — 7100000010 HC PHASE II RECOVERY - FIRST 15 MIN: Performed by: ORTHOPAEDIC SURGERY

## 2023-03-15 PROCEDURE — 3600000012 HC SURGERY LEVEL 2 ADDTL 15MIN: Performed by: ORTHOPAEDIC SURGERY

## 2023-03-15 PROCEDURE — 3700000001 HC ADD 15 MINUTES (ANESTHESIA): Performed by: ORTHOPAEDIC SURGERY

## 2023-03-15 PROCEDURE — 71045 X-RAY EXAM CHEST 1 VIEW: CPT

## 2023-03-15 PROCEDURE — 2500000003 HC RX 250 WO HCPCS

## 2023-03-15 PROCEDURE — 93306 TTE W/DOPPLER COMPLETE: CPT

## 2023-03-15 PROCEDURE — 7100000001 HC PACU RECOVERY - ADDTL 15 MIN: Performed by: ORTHOPAEDIC SURGERY

## 2023-03-15 RX ORDER — ONDANSETRON 2 MG/ML
4 INJECTION INTRAMUSCULAR; INTRAVENOUS EVERY 6 HOURS PRN
Status: DISCONTINUED | OUTPATIENT
Start: 2023-03-15 | End: 2023-03-15 | Stop reason: HOSPADM

## 2023-03-15 RX ORDER — SODIUM CHLORIDE 9 MG/ML
INJECTION, SOLUTION INTRAVENOUS CONTINUOUS
Status: DISCONTINUED | OUTPATIENT
Start: 2023-03-15 | End: 2023-03-15

## 2023-03-15 RX ORDER — OXYCODONE HYDROCHLORIDE AND ACETAMINOPHEN 5; 325 MG/1; MG/1
1 TABLET ORAL EVERY 6 HOURS PRN
Qty: 28 TABLET | Refills: 0 | Status: SHIPPED | OUTPATIENT
Start: 2023-03-15 | End: 2023-03-22

## 2023-03-15 RX ORDER — ACETAMINOPHEN 325 MG/1
650 TABLET ORAL EVERY 6 HOURS PRN
Status: DISCONTINUED | OUTPATIENT
Start: 2023-03-15 | End: 2023-03-15 | Stop reason: HOSPADM

## 2023-03-15 RX ORDER — HEPARIN SODIUM 10000 [USP'U]/ML
5000 INJECTION, SOLUTION INTRAVENOUS; SUBCUTANEOUS EVERY 8 HOURS SCHEDULED
Status: DISCONTINUED | OUTPATIENT
Start: 2023-03-15 | End: 2023-03-15 | Stop reason: HOSPADM

## 2023-03-15 RX ORDER — ENOXAPARIN SODIUM 100 MG/ML
30 INJECTION SUBCUTANEOUS 2 TIMES DAILY
Status: DISCONTINUED | OUTPATIENT
Start: 2023-03-15 | End: 2023-03-15

## 2023-03-15 RX ORDER — ACETAMINOPHEN 650 MG/1
650 SUPPOSITORY RECTAL EVERY 6 HOURS PRN
Status: DISCONTINUED | OUTPATIENT
Start: 2023-03-15 | End: 2023-03-15 | Stop reason: HOSPADM

## 2023-03-15 RX ORDER — KETAMINE HYDROCHLORIDE 10 MG/ML
INJECTION INTRAMUSCULAR; INTRAVENOUS PRN
Status: DISCONTINUED | OUTPATIENT
Start: 2023-03-15 | End: 2023-03-15 | Stop reason: SDUPTHER

## 2023-03-15 RX ORDER — LABETALOL HYDROCHLORIDE 5 MG/ML
10 INJECTION, SOLUTION INTRAVENOUS EVERY 4 HOURS PRN
Status: DISCONTINUED | OUTPATIENT
Start: 2023-03-15 | End: 2023-03-15 | Stop reason: HOSPADM

## 2023-03-15 RX ORDER — SODIUM CHLORIDE 9 MG/ML
INJECTION, SOLUTION INTRAVENOUS PRN
Status: DISCONTINUED | OUTPATIENT
Start: 2023-03-15 | End: 2023-03-15 | Stop reason: HOSPADM

## 2023-03-15 RX ORDER — HYDRALAZINE HYDROCHLORIDE 20 MG/ML
5 INJECTION INTRAMUSCULAR; INTRAVENOUS AS NEEDED
Status: COMPLETED | OUTPATIENT
Start: 2023-03-15 | End: 2023-03-15

## 2023-03-15 RX ORDER — PROPOFOL 10 MG/ML
INJECTION, EMULSION INTRAVENOUS CONTINUOUS PRN
Status: DISCONTINUED | OUTPATIENT
Start: 2023-03-15 | End: 2023-03-15 | Stop reason: SDUPTHER

## 2023-03-15 RX ORDER — LABETALOL HYDROCHLORIDE 5 MG/ML
INJECTION, SOLUTION INTRAVENOUS PRN
Status: DISCONTINUED | OUTPATIENT
Start: 2023-03-15 | End: 2023-03-15 | Stop reason: SDUPTHER

## 2023-03-15 RX ORDER — SODIUM CHLORIDE 0.9 % (FLUSH) 0.9 %
5-40 SYRINGE (ML) INJECTION PRN
Status: DISCONTINUED | OUTPATIENT
Start: 2023-03-15 | End: 2023-03-15 | Stop reason: HOSPADM

## 2023-03-15 RX ORDER — LABETALOL HYDROCHLORIDE 5 MG/ML
10 INJECTION, SOLUTION INTRAVENOUS ONCE
Status: COMPLETED | OUTPATIENT
Start: 2023-03-15 | End: 2023-03-15

## 2023-03-15 RX ORDER — SODIUM CHLORIDE 0.9 % (FLUSH) 0.9 %
5-40 SYRINGE (ML) INJECTION EVERY 12 HOURS SCHEDULED
Status: DISCONTINUED | OUTPATIENT
Start: 2023-03-15 | End: 2023-03-15 | Stop reason: HOSPADM

## 2023-03-15 RX ORDER — LIDOCAINE HYDROCHLORIDE 10 MG/ML
INJECTION, SOLUTION INFILTRATION; PERINEURAL PRN
Status: DISCONTINUED | OUTPATIENT
Start: 2023-03-15 | End: 2023-03-15 | Stop reason: ALTCHOICE

## 2023-03-15 RX ORDER — LISINOPRIL 20 MG/1
20 TABLET ORAL DAILY
Status: DISCONTINUED | OUTPATIENT
Start: 2023-03-15 | End: 2023-03-15 | Stop reason: HOSPADM

## 2023-03-15 RX ORDER — POLYETHYLENE GLYCOL 3350 17 G/17G
17 POWDER, FOR SOLUTION ORAL DAILY PRN
Status: DISCONTINUED | OUTPATIENT
Start: 2023-03-15 | End: 2023-03-15 | Stop reason: HOSPADM

## 2023-03-15 RX ORDER — FENTANYL CITRATE 50 UG/ML
INJECTION, SOLUTION INTRAMUSCULAR; INTRAVENOUS PRN
Status: DISCONTINUED | OUTPATIENT
Start: 2023-03-15 | End: 2023-03-15 | Stop reason: SDUPTHER

## 2023-03-15 RX ORDER — OXYCODONE HYDROCHLORIDE AND ACETAMINOPHEN 5; 325 MG/1; MG/1
1 TABLET ORAL EVERY 6 HOURS PRN
Qty: 28 TABLET | Refills: 0 | Status: SHIPPED | OUTPATIENT
Start: 2023-03-15 | End: 2023-03-15 | Stop reason: SDUPTHER

## 2023-03-15 RX ORDER — IPRATROPIUM BROMIDE AND ALBUTEROL SULFATE 2.5; .5 MG/3ML; MG/3ML
1 SOLUTION RESPIRATORY (INHALATION)
Status: COMPLETED | OUTPATIENT
Start: 2023-03-15 | End: 2023-03-15

## 2023-03-15 RX ORDER — ONDANSETRON 4 MG/1
4 TABLET, ORALLY DISINTEGRATING ORAL EVERY 8 HOURS PRN
Status: DISCONTINUED | OUTPATIENT
Start: 2023-03-15 | End: 2023-03-15 | Stop reason: HOSPADM

## 2023-03-15 RX ORDER — GLYCOPYRROLATE 0.2 MG/ML
INJECTION INTRAMUSCULAR; INTRAVENOUS PRN
Status: DISCONTINUED | OUTPATIENT
Start: 2023-03-15 | End: 2023-03-15 | Stop reason: SDUPTHER

## 2023-03-15 RX ORDER — LISINOPRIL 10 MG/1
20 TABLET ORAL DAILY
Qty: 90 TABLET | Refills: 1 | Status: SHIPPED | OUTPATIENT
Start: 2023-03-15

## 2023-03-15 RX ORDER — MIDAZOLAM HYDROCHLORIDE 1 MG/ML
INJECTION INTRAMUSCULAR; INTRAVENOUS PRN
Status: DISCONTINUED | OUTPATIENT
Start: 2023-03-15 | End: 2023-03-15 | Stop reason: SDUPTHER

## 2023-03-15 RX ADMIN — LABETALOL HYDROCHLORIDE 5 MG: 5 INJECTION INTRAVENOUS at 07:48

## 2023-03-15 RX ADMIN — LABETALOL HYDROCHLORIDE 10 MG: 5 INJECTION, SOLUTION INTRAVENOUS at 08:51

## 2023-03-15 RX ADMIN — HYDRALAZINE HYDROCHLORIDE 5 MG: 20 INJECTION INTRAMUSCULAR; INTRAVENOUS at 09:29

## 2023-03-15 RX ADMIN — HYDRALAZINE HYDROCHLORIDE 5 MG: 20 INJECTION INTRAMUSCULAR; INTRAVENOUS at 09:19

## 2023-03-15 RX ADMIN — HYDRALAZINE HYDROCHLORIDE 5 MG: 20 INJECTION INTRAMUSCULAR; INTRAVENOUS at 10:50

## 2023-03-15 RX ADMIN — FENTANYL CITRATE 50 MCG: 50 INJECTION, SOLUTION INTRAMUSCULAR; INTRAVENOUS at 07:32

## 2023-03-15 RX ADMIN — KETAMINE HYDROCHLORIDE 20 MG: 10 INJECTION INTRAMUSCULAR; INTRAVENOUS at 07:45

## 2023-03-15 RX ADMIN — SODIUM CHLORIDE: 9 INJECTION, SOLUTION INTRAVENOUS at 07:33

## 2023-03-15 RX ADMIN — SODIUM CHLORIDE: 9 INJECTION, SOLUTION INTRAVENOUS at 08:25

## 2023-03-15 RX ADMIN — MIDAZOLAM 2 MG: 1 INJECTION INTRAMUSCULAR; INTRAVENOUS at 07:28

## 2023-03-15 RX ADMIN — GLYCOPYRROLATE 0.2 MG: 0.2 INJECTION, SOLUTION INTRAMUSCULAR; INTRAVENOUS at 07:41

## 2023-03-15 RX ADMIN — LISINOPRIL 20 MG: 20 TABLET ORAL at 16:16

## 2023-03-15 RX ADMIN — LABETALOL HYDROCHLORIDE 10 MG: 5 INJECTION INTRAVENOUS at 11:53

## 2023-03-15 RX ADMIN — LABETALOL HYDROCHLORIDE 5 MG: 5 INJECTION INTRAVENOUS at 08:23

## 2023-03-15 RX ADMIN — CEFAZOLIN 3000 MG: 10 INJECTION, POWDER, FOR SOLUTION INTRAVENOUS at 07:33

## 2023-03-15 RX ADMIN — LABETALOL HYDROCHLORIDE 5 MG: 5 INJECTION INTRAVENOUS at 08:29

## 2023-03-15 RX ADMIN — KETAMINE HYDROCHLORIDE 30 MG: 10 INJECTION INTRAMUSCULAR; INTRAVENOUS at 07:41

## 2023-03-15 RX ADMIN — HYDRALAZINE HYDROCHLORIDE 5 MG: 20 INJECTION INTRAMUSCULAR; INTRAVENOUS at 11:06

## 2023-03-15 RX ADMIN — IPRATROPIUM BROMIDE AND ALBUTEROL SULFATE 1 AMPULE: .5; 3 SOLUTION RESPIRATORY (INHALATION) at 09:14

## 2023-03-15 RX ADMIN — PROPOFOL 150 MCG/KG/MIN: 10 INJECTION, EMULSION INTRAVENOUS at 07:32

## 2023-03-15 ASSESSMENT — PAIN SCALES - GENERAL
PAINLEVEL_OUTOF10: 0

## 2023-03-15 ASSESSMENT — PAIN - FUNCTIONAL ASSESSMENT
PAIN_FUNCTIONAL_ASSESSMENT: NONE - DENIES PAIN
PAIN_FUNCTIONAL_ASSESSMENT: NONE - DENIES PAIN

## 2023-03-15 NOTE — PROGRESS NOTES
Percocet script sent to our pharmacy to be filled, however script was not deivered d/t originl plan for patient to stay admitted. Dr. Spring Dukes resident did phone script to [facility], however they are unable to fill today d/t same script being filled at another pharmacy. Explained situation to the patient. He still wants to discharge. He states he has some pain medication at home from when the original injury happened that he could use this evening if needed.     Electronically signed by uJarez Valentin RN on 3/15/2023 at 6:00 PM

## 2023-03-15 NOTE — ANESTHESIA PRE PROCEDURE
Department of Anesthesiology  Preprocedure Note       Name:  Diego Harding. Age:  50 y.o.  :  1975                                          MRN:  [id number]         Date:  3/15/2023      Surgeon: India Wood):  Yusuf Vicente MD    Procedure: Procedure(s):  RIGHT RING FINGER DOUBLE Z PLASTY CONTRACTURE RELEASE    Medications prior to admission:   Prior to Admission medications    Medication Sig Start Date End Date Taking? Authorizing Provider   oxyCODONE-acetaminophen (PERCOCET) 5-325 MG per tablet Take 1 tablet by mouth every 6 hours as needed for Pain for up to 7 days.  Max Daily Amount: 4 tablets 3/15/23 3/22/23 Yes LISA Loving   lisinopril (PRINIVIL;ZESTRIL) 20 MG tablet Take 20 mg by mouth daily    Historical Provider, MD       Current medications:    Current Facility-Administered Medications   Medication Dose Route Frequency Provider Last Rate Last Admin    0.9 % sodium chloride infusion   IntraVENous Continuous LISA Loving        sodium chloride flush 0.9 % injection 5-40 mL  5-40 mL IntraVENous 2 times per day LISA Loving        sodium chloride flush 0.9 % injection 5-40 mL  5-40 mL IntraVENous PRN LISA Loving        0.9 % sodium chloride infusion   IntraVENous PRN LISA Loving        ceFAZolin (ANCEF) 3,000 mg in sodium chloride 0.9 % 100 mL IVPB  3,000 mg IntraVENous On Call to [address] LISA [address]           Allergies:  No Known Allergies    Problem List:    Patient Active Problem List   Diagnosis Code    Right hand fracture, open, initial encounter S62.91XB    Open displaced fracture of fifth metacarpal bone of right hand S62.306B    Open displaced fracture of base of fourth metacarpal bone of right hand S62.314B    Degloving injury of right hand S61.401A       Past Medical History:        Diagnosis Date    Contracture of finger joint, right     ring    GERD (gastroesophageal reflux disease)     Hypertension        Past Surgical History: Procedure Laterality Date    HAND SURGERY Right 8/1/2022    HAND OPEN REDUCTION INTERNAL FIXATION,  INCISION AND DRAINAGE performed by Kem Villeda MD at [address] [facility] History:    Social History     Tobacco Use    Smoking status: Never    Smokeless tobacco: Not on file   Substance Use Topics    Alcohol use: Yes     Comment: rarely                                Counseling given: Not Answered      Vital Signs (Current):   Vitals:    03/15/23 0613 03/15/23 0628 03/15/23 0635 03/15/23 0643   BP:   (!) 170/110    Pulse:  75     Resp: 18      Temp: 36.1 °C (97 °F)      TempSrc: Temporal      SpO2:    97%   Weight: 270 lb (122.5 kg)      Height: 5' 11\" (1.803 m)                                                 BP Readings from Last 3 Encounters:   03/15/23 (!) 170/110   08/02/22 (!) 145/77   12/03/16 (!) 137/94       NPO Status: Time of last liquid consumption: 2000                        Time of last solid consumption: 2000                        Date of last liquid consumption: 03/14/23                        Date of last solid food consumption: 03/14/23    BMI:   Wt Readings from Last 3 Encounters:   03/15/23 270 lb (122.5 kg)   12/15/22 250 lb (113.4 kg)   10/06/22 250 lb (113.4 kg)     Body mass index is 37.66 kg/m².     CBC:   Lab Results   Component Value Date/Time    WBC 18.1 08/02/2022 04:44 AM    RBC 4.56 08/02/2022 04:44 AM    HGB 14.1 08/02/2022 04:44 AM    HCT 42.2 08/02/2022 04:44 AM    MCV 92.5 08/02/2022 04:44 AM    RDW 12.2 08/02/2022 04:44 AM     08/02/2022 04:44 AM       CMP:   Lab Results   Component Value Date/Time     08/02/2022 04:44 AM    K 4.9 08/02/2022 04:44 AM     08/02/2022 04:44 AM    CO2 18 08/02/2022 04:44 AM    BUN 14 08/02/2022 04:44 AM    CREATININE 1.1 08/02/2022 04:44 AM    GFRAA >60 08/02/2022 04:44 AM    LABGLOM >60 08/02/2022 04:44 AM    GLUCOSE 178 08/02/2022 04:44 AM    PROT 7.4 12/03/2016 09:30 PM    CALCIUM 8.8 08/02/2022 04:44 AM    BILITOT 0.3 12/03/2016 09:30 PM    ALKPHOS 91 12/03/2016 09:30 PM    AST 17 12/03/2016 09:30 PM    ALT 38 12/03/2016 09:30 PM       POC Tests: No results for input(s): POCGLU, POCNA, POCK, POCCL, POCBUN, POCHEMO, POCHCT in the last 72 hours. Coags: No results found for: PROTIME, INR, APTT    HCG (If Applicable): No results found for: PREGTESTUR, PREGSERUM, HCG, HCGQUANT     ABGs: No results found for: PHART, PO2ART, ZMQ8BSV, DKO2SSK, BEART, K0ZJXSJI     Type & Screen (If Applicable):  No results found for: LABABO, LABRH    Drug/Infectious Status (If Applicable):  No results found for: HIV, HEPCAB    COVID-19 Screening (If Applicable): No results found for: COVID19        Anesthesia Evaluation  Patient summary reviewed and Nursing notes reviewed no history of anesthetic complications:   Airway: Mallampati: IV  TM distance: >3 FB   Neck ROM: full  Mouth opening: > = 3 FB   Dental: normal exam         Pulmonary:normal exam  breath sounds clear to auscultation  (+) sleep apnea: on CPAP,                             Cardiovascular:  Exercise tolerance: good (>4 METS),   (+) hypertension:,       ECG reviewed  Rhythm: regular  Rate: normal                    Neuro/Psych:   Negative Neuro/Psych ROS              GI/Hepatic/Renal:   (+) GERD: well controlled,           Endo/Other: Negative Endo/Other ROS                    Abdominal:   (+) obese,           Vascular: negative vascular ROS. Other Findings:           Anesthesia Plan      MAC     ASA 3       Induction: intravenous. NADINE Munson - CRNA   3/15/2023      Pt seen, examined, chart reviewed, plan discussed.   Sravanthi Francisco MD  3/15/2023  7:15 AM

## 2023-03-15 NOTE — DISCHARGE SUMMARY
[facility] Hospitalist Physician Discharge Summary       Tom Adames MD  [address] [facility] [address]  [phone]    Follow up in 1 week(s)  post operative check      Activity level: As tolerated     Dispo: Home      Condition on discharge: Stable     Patient ID:  Reid Langston Sr.  [id number]  47 y.o.  1975    Admit date: 3/15/2023    Discharge date and time:  3/15/2023  5:34 PM    Admission Diagnoses: Principal Problem:    Accelerated hypertension  Active Problems:    Post-operative pain  Resolved Problems:    * No resolved hospital problems. *      Discharge Diagnoses: Principal Problem:    Accelerated hypertension  Active Problems:    Post-operative pain  Resolved Problems:    * No resolved hospital problems. *      Consults:  IP CONSULT TO IV TEAM    Procedures: None    Hospital Course:   Patient Reid Langston Sr. is a 47 y.o. presented with Contracture of joint of right hand [M24.541]  Scar condition and fibrosis of skin [L90.5]  Accelerated hypertension [I10]    47 year old male in the [place] presented for right hand contracture surgery. He had SBPs in the 170. Patient denies any symptoms at that time. I received a call for admission. STAT labs were ordered. Patient states that he takes lisinopril at home however has not been taking that at home because he ran out of the script and then he went on vacation. Currently he denies any chest pain, sob, headache, changes in vision, and no neurological symptoms. When I spoke to the patient and family at bedside he asked if he can go home. I reviewed his last few vital signs and labs which appear unremarkable. Tropnoin and ekg are unremarkable. Patient does not appear to be symptomatic. And so he can be discahrged with the lisinopril prescription that I gave him and he is to see PCP in 1-2 days which he is agreeable to.    Discharge Exam:    General Appearance: alert and oriented to person, place and time and in no acute distress  Skin:  warm and dry  Head: normocephalic and atraumatic  Eyes: pupils equal, round, and reactive to light, extraocular eye movements intact, conjunctivae normal  Neck: neck supple and non tender without mass   Pulmonary/Chest: clear to auscultation bilaterally- no wheezes, rales or rhonchi, normal air movement, no respiratory distress  Cardiovascular: normal rate, normal S1 and S2 and no carotid bruits  Abdomen: soft, non-tender, non-distended, normal bowel sounds, no masses or organomegaly  Extremities: no cyanosis, no clubbing and no edema  Neurologic: no cranial nerve deficit and speech normal    No intake/output data recorded. I/O this shift:  In: 1100 [I.V.:1000; IV Piggyback:100]  Out: 750 [Urine:750]      LABS:  Recent Labs     03/15/23  1418      K 3.8      CO2 21*   BUN 7   CREATININE 0.9   GLUCOSE 118*   CALCIUM 9.1       Recent Labs     03/15/23  1210   WBC 15.6*   RBC 5.00   HGB 15.2   HCT 46.7   MCV 93.4   MCH 30.4   MCHC 32.5   RDW 12.6      MPV 9.2       No results for input(s): POCGLU in the last 72 hours. Imaging:  No results found. Patient Instructions:      Medication List        START taking these medications      oxyCODONE-acetaminophen 5-325 MG per tablet  Commonly known as: Percocet  Take 1 tablet by mouth every 6 hours as needed for Pain for up to 7 days.  Max Daily Amount: 4 tablets            CHANGE how you take these medications      lisinopril 10 MG tablet  Commonly known as: PRINIVIL;ZESTRIL  Take 2 tablets by mouth daily  What changed: medication strength               Where to Get Your Medications        These medications were sent to [facility], [address] [place] [address], [address] [place] [address]      Phone: [phone]   oxyCODONE-acetaminophen 5-325 MG per tablet       You can get these medications from any pharmacy    Bring a paper prescription for each of these medications  lisinopril 10 MG tablet           Note that 36 minutes was spent in preparing discharge papers, discussing discharge with patient, medication review, etc.    Signed:  Electronically signed by Loretta Howell DO on 3/15/2023 at 5:34 PM

## 2023-03-15 NOTE — PROGRESS NOTES
Family in person notified of of patient status x2. Will call with bed #.         Report called to [phone] RN

## 2023-03-15 NOTE — ANESTHESIA POSTPROCEDURE EVALUATION
Department of Anesthesiology  Postprocedure Note    Patient: Ruchi Duncan Sr. MRN: [id number]  YOB: 1975  Date of evaluation: 3/15/2023      Procedure Summary     Date: 03/15/23 Room / Location: [facility] OR 03 / [facility]    Anesthesia Start: 9309 Anesthesia Stop: 0830    Procedure: RIGHT RING FINGER DOUBLE Z PLASTY CONTRACTURE RELEASE AND MIDDLE FINGER CONTRACTURE RELEASE (Right: Hand) Diagnosis:       Contracture of joint of right hand      Scar condition and fibrosis of skin      (Contracture of joint of right hand [M24.541])      (Scar condition and fibrosis of skin [L90.5])    Surgeons: Esther Eckert MD Responsible Provider: Javier Bone MD    Anesthesia Type: MAC ASA Status: 3          Anesthesia Type: No value filed.     Abdulkadir Phase I: Abdulkadir Score: 8    Abdulkadir Phase II:        Anesthesia Post Evaluation    Patient location during evaluation: PACU  Patient participation: complete - patient participated  Level of consciousness: awake  Pain score: 3  Airway patency: patent  Nausea & Vomiting: no nausea and no vomiting  Complications: no  Cardiovascular status: blood pressure returned to baseline  Respiratory status: acceptable  Hydration status: euvolemic

## 2023-03-15 NOTE — OP NOTE
[address] [address]                                OPERATIVE REPORT    PATIENT NAME: Cordell Vazquez                  :        1975  MED REC NO:   [id number]                            ROOM:       7137  ACCOUNT NO:   [de-identified]                           ADMIT DATE: 03/15/2023  PROVIDER:     Sisi Javier MD    DATE OF PROCEDURE:  03/15/2023    PREOPERATIVE DIAGNOSES:  1. Right ring finger scar contracture. 2.  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 POSTOPERATIVE DIAGNOSES:  1. Right ring finger scar contracture. 2.  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 PROCEDURES PERFORMED:  1. Right ring finger scar excision and double Z-plasty. 2.  Right middle finger MCP joint contracture release. SURGEON:  Sisi Javier M.D. ANESTHESIA:  1. Monitored anesthesia care. 2.  Local anesthetic by surgeon consisting of approximately 10 mL of 1%  lidocaine plain. ASSISTANTS:  1.  Dr. Tiffanie Huang, orthopedic surgery resident. 2.  Gonzalez Quintana, physician assistant certified. She was present  throughout the procedure. Her assistance was used for preoperative  positioning, intraoperative retraction, closure, and dressing  application. Her assistance expedited the case and decreased the  surgical time. TOTAL TOURNIQUET TIME:  Approximately 20 minutes at 250 mmHg of brachial  tourniquet. ESTIMATED BLOOD LOSS:  Minimal.    FINDINGS:  1.  Evidence of significant scar contracture across the ring finger MCP  and PIP joints. Status post Z-plasty and scar excision, full extension  of the finger was achieved and good coverage was achieved with  mobilization of the Z-plasty flaps.   2.  MCP joint extension contracture of the middle finger preventing full  composite MCP joint flexion was corrected with release of the dorsal  capsule of the MCP joint as well as partial takedown of the radial and  ulnar collateral ligaments to achieve near-full MCP joint flexion. SPECIMENS:  Excised scar tissue was sent for path. DISPOSITION:  The patient remained stable throughout the procedure. OPERATIVE INDICATIONS:  The patient is a very pleasant 66-year-old  gentleman with persistent recalcitrant right ring finger contracture  after sustaining a severe injury to his right hand. He was seen in the  office and wished to undergo a Z-plasty of the ring finger. At the date  of surgery, he is also requesting correction of the MCP joint extension  contracture to the adjacent middle finger. The risks, the benefits,  alternatives, and complication of surgery were explained to him  including, but not limited to the risk of infection, damage to nerves,  vessels, tendons, failure to improve his symptoms or worsening symptoms,  recurrence of the contracture, recurrence of the scar tissue, need for  compliance with postoperative recommendations including skilled therapy,  splinting as well as the possible need for additional surgery and  unforeseen complications. He understood. All questions were answered  and he would like to proceed with surgical intervention. DESCRIPTION OF PROCEDURE:  The patient was identified in the holding  area. The right hand was identified as the surgical site. Both the  middle and ring fingers were identified as the surgical sites. He was  then seen by Anesthesia, taken to the operating room, placed supine on  the table, and underwent monitored anesthesia care per the Anesthesia  Department. A well-padded arm tourniquet was placed. Under sterile  conditions, local anesthetic infiltrated over the surgical sites. The  arm was then prepared and draped in the standard sterile fashion. Arm  was exsanguinated. Tourniquet was inflated to 250 mmHg. Total  tourniquet time was 20 minutes.     A Z-plasty was then planned over the scar contracture extending across  the ring finger MCP joint extending to the level of the PIP joint. A  double Z-plasty was planned. Skin was incised sharply with a 15-blade  scalpel. Flaps were elevated off the underlying dense scar tissue. Flaps were mobilized and the neurovascular bundle, particularly the  radial as well as the ulnar neurovascular bundles of the ring finger  were identified and protected. The deep scar tissue was then excised  distally, brought proximally, and released proximally. Flaps were then  mobilized carefully and the Z-plasty was created distally followed by  proximally and adjusted as necessary for flap coverage. This provided  excellent coverage with full correction of the MCP and PIP joint  contracture. Attention was directed dorsally to the middle finger MCP joint. A  longitudinal incision over the MCP joint was then created. There was  dense scar tissue to the overlying extensor tendon, which was mobilized  proximally and distally completing a tenolysis. The extensor mechanism  was then mobilized proximally and elevated off the dorsal capsule of the  MCP joint preserving the radial and ulnar sagittal bands. Dorsal  capsulectomy was then completed through windows on either side of the  extensor digitorum communis. This did improve, but did not fully  correct the MCP joint extension contracture, therefore approximately 50%  of the radial followed by the ulnar collateral ligament was taken down,  which then resulted in near full correction of the MCP joint extension  contracture resulting in approximately 90 degrees of MCP joint flexion. This wound was then copiously irrigated out. Followed by irrigation of  the palmar wound, the tourniquet was deflated. Hemostasis was achieved  with bipolar cautery. The palmar flaps were sewn into place with  multiple 4-0 nylon sutures followed by dorsal skin closure with 3-0  nylon. A bulky sterile dressing was then applied.   The index and middle  finger MCP joints were left free.  A splint was applied volarly across  the ring and little fingers maintaining full MCP and PIP joint extension  to the ring finger. There was brisk cap refill of all digits, in  particularly the middle and ring fingers. The patient tolerated the  procedure well and was taken to the recovery room.         Gilberto Erickson MD    D: 03/15/2023 8:49:00       T: 03/15/2023 8:53:28     [email or web address]  Job#: [id number]     Doc#: [id number]    CC:

## 2023-03-15 NOTE — H&P
[facility] Hospitalist Group History and Physical      CHIEF COMPLAINT:  elevated blood pressure    History of Present Illness:      47 year old male in the [place] presented for right hand contracture surgery. He had SBPs in the 170. Patient denies any symptoms at that time. I received a call for admission. STAT labs were ordered. Patient states that he takes lisinopril at home however has not been taking that at home because he ran out of the script and then he went on vacation. Currently he denies any chest pain, sob, headache, changes in vision, and no neurological symptoms.     Informant(s) for H&P: patient    REVIEW OF SYSTEMS:  A comprehensive review of systems was negative except for: what is in the HPI      PMH:  Past Medical History:   Diagnosis Date    Contracture of finger joint, right     ring    GERD (gastroesophageal reflux disease)     Hypertension        Surgical History:  Past Surgical History:   Procedure Laterality Date    HAND SURGERY Right 8/1/2022    HAND OPEN REDUCTION INTERNAL FIXATION,  INCISION AND DRAINAGE performed by Tom Adames MD at [facility] OR    HAND SURGERY Right 3/15/2023    RIGHT RING FINGER DOUBLE Z PLASTY CONTRACTURE RELEASE AND MIDDLE FINGER CONTRACTURE RELEASE performed by Tom Adames MD at [facility] OR       Medications Prior to Admission:    Prior to Admission medications    Medication Sig Start Date End Date Taking? Authorizing Provider   oxyCODONE-acetaminophen (PERCOCET) 5-325 MG per tablet Take 1 tablet by mouth every 6 hours as needed for Pain for up to 7 days. Max Daily Amount: 4 tablets 3/15/23 3/22/23 Yes LISA Leigh   lisinopril (PRINIVIL;ZESTRIL) 10 MG tablet Take 2 tablets by mouth daily 3/15/23  Yes Florinda Reyes DO       Allergies:    Patient has no known allergies.    Social History:    reports that he has never smoked. He does not have any smokeless tobacco history on file. He reports current alcohol use. He reports that he does not use  drugs. Family History:   family history is not on file. PHYSICAL EXAM:  Vitals:  /76   Pulse (!) 113   Temp 99 °F (37.2 °C) (Oral)   Resp 16   Ht 5' 11\" (1.803 m)   Wt 270 lb (122.5 kg)   SpO2 96%   BMI 37.66 kg/m²     General Appearance: alert and oriented to person, place and time and in no acute distress  Skin: warm and dry  Head: normocephalic and atraumatic  Eyes: pupils equal, round, and reactive to light, extraocular eye movements intact, conjunctivae normal  Neck: neck supple and non tender without mass   Pulmonary/Chest: clear to auscultation bilaterally- no wheezes, rales or rhonchi, normal air movement, no respiratory distress  Cardiovascular: normal rate, normal S1 and S2 and no carotid bruits  Abdomen: soft, non-tender, non-distended, normal bowel sounds, no masses or organomegaly  Extremities: no cyanosis, no clubbing and no edema  Neurologic: no cranial nerve deficit and speech normal        LABS:  Recent Labs     03/15/23  1418      K 3.8      CO2 21*   BUN 7   CREATININE 0.9   GLUCOSE 118*   CALCIUM 9.1       Recent Labs     03/15/23  1210   WBC 15.6*   RBC 5.00   HGB 15.2   HCT 46.7   MCV 93.4   MCH 30.4   MCHC 32.5   RDW 12.6      MPV 9.2       No results for input(s): POCGLU in the last 72 hours.     CBC:   Lab Results   Component Value Date/Time    WBC 15.6 03/15/2023 12:10 PM    RBC 5.00 03/15/2023 12:10 PM    HGB 15.2 03/15/2023 12:10 PM    HCT 46.7 03/15/2023 12:10 PM    MCV 93.4 03/15/2023 12:10 PM    MCH 30.4 03/15/2023 12:10 PM    MCHC 32.5 03/15/2023 12:10 PM    RDW 12.6 03/15/2023 12:10 PM     03/15/2023 12:10 PM    MPV 9.2 03/15/2023 12:10 PM     BMP:    Lab Results   Component Value Date/Time     03/15/2023 02:18 PM    K 3.8 03/15/2023 02:18 PM    K 4.9 08/02/2022 04:44 AM     03/15/2023 02:18 PM    CO2 21 03/15/2023 02:18 PM    BUN 7 03/15/2023 02:18 PM    LABALBU 4.0 03/15/2023 02:18 PM    CREATININE 0.9 03/15/2023 02:18 PM CALCIUM 9.1 03/15/2023 02:18 PM    GFRAA >60 08/02/2022 04:44 AM    LABGLOM >60 03/15/2023 02:18 PM    GLUCOSE 118 03/15/2023 02:18 PM       Radiology:   XR CHEST PORTABLE   Final Result   Platelike linear density in the right mid lung consistent with subsegmental   atelectasis and/or scarring. Otherwise, unremarkable chest.             EKG:   NSR, rate 95, normal axis, normal intervals, no ST or T wave changes noted    ASSESSMENT:      Principal Problem:    Accelerated hypertension  Active Problems:    Post-operative pain  Resolved Problems:    * No resolved hospital problems. *      PLAN:    Accelerated HTN due to non compliance - Normal troponin and EKG. Continue home lisinopril  Right hand contracture S/P RIGHT RING FINGER DOUBLE Z PLASTY CONTRACTURE RELEASE AND MIDDLE FINGER CONTRACTURE RELEASE - Pain management as per ortho  DVT prophylaxis - Lovenox      NOTE: This report was transcribed using voice recognition software. Every effort was made to ensure accuracy; however, inadvertent computerized transcription errors may be present.   Electronically signed by Rosette Kimball DO on 3/15/2023 at 5:27 PM

## 2023-03-15 NOTE — PROGRESS NOTES
Notified Dr Salty Dias of patient status, Notified Dr Ivet Murray of patient bp , not comfortable with dc, notified Dr Coni Evans will see patient.

## 2023-03-15 NOTE — H&P
Department of Orthopedic Surgery  History and Physical        CHIEF COMPLAINT: 4 months following right hand crush injury with subsequent debridement and open reduction internal fixation     HISTORY OF PRESENT ILLNESS:                 The patient is a 50 y.o. right-hand-dominant male who presents 4 months following right hand crush injury with surgical debridement and open reduction internal fixation. Patient has been doing well in therapy. 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 No new numbness or paresthesias since last seen in October. No other complaints today. Patient is here to discuss potential contracture release of the right ring finger today. Past Medical History:    Past Medical History             Diagnosis Date    GERD (gastroesophageal reflux disease)      Hypertension      Kidney stone      MI, old           Past Surgical History:    Past Surgical History             Procedure Laterality Date    HAND SURGERY Right 8/1/2022     HAND OPEN REDUCTION INTERNAL FIXATION,  INCISION AND DRAINAGE performed by Sharif Horan MD at [facility] OR         Current Medications:   Current Hospital Medications   No current facility-administered medications for this visit. Allergies:  Patient has no known allergies. Social History:   TOBACCO:   reports that he has never smoked. He does not have any smokeless tobacco history on file. ETOH:   reports current alcohol use. DRUGS:   has no history on file for drug use. ACTIVITIES OF DAILY LIVING:    OCCUPATION:    Family History:   Family History   History reviewed. No pertinent family history.         REVIEW OF SYSTEMS:  CONSTITUTIONAL:  negative  HEENT:  negative  RESPIRATORY:  negative  CARDIOVASCULAR:  negative  GASTROINTESTINAL: GERD  HEMATOLOGIC/LYMPHATIC:  negative  ENDOCRINE:  negative  MUSCULOSKELETAL: Right ring finger contracture  NEUROLOGICAL:  negative  BEHAVIOR/PSYCH:  negative     PHYSICAL EXAM:    VITALS:  Ht 5' 11\" (1.803 m) Wt 250 lb (113.4 kg)   BMI 34.87 kg/m²   CONSTITUTIONAL:  awake, alert, cooperative, no apparent distress, and appears stated age  EYES:  Lids and lashes normal, pupils equal, round and reactive to light, extra ocular muscles intact, sclera clear, conjunctiva normal  ENT:  Normocephalic, without obvious abnormality, atraumatic, sinuses nontender on palpation, external ears without lesions, oral pharynx with moist mucus membranes, tonsils without erythema or exudates, gums normal and good dentition. NECK:  Supple, symmetrical, trachea midline, no adenopathy, thyroid symmetric, not enlarged and no tenderness, skin normal  LUNGS:  CTA  CARDIOVASCULAR:  2+ radial pulses, extremities warm and well perfused  ABDOMEN:  NTTP  CHEST:  Atraumatic   GENITAL/URINARY:  deferred  NEUROLOGIC:  Awake, alert, oriented to name, place and time. Cranial nerves II-XII are grossly intact. Motor is 5 out of 5 bilaterally. Sensory is intact.  gait is normal.  MUSCULOSKELETAL:     Right upper extremity:     Right hand with mild swelling. He does have a scar contracture to the ring finger about the volar aspect limiting extension at the PIP joint. No signs of infection. He does have good healing of the dorsal skin flap. No overt signs of infection. Patient able to make composite fist.  Patient also with hyperextension of the long finger MCP joint causing secondary PIP flexion. When the hyperextension deformity of the long finger is corrected, obligate PIP flexion also corrected.           DATA:    CBC:         Lab Results   Component Value Date/Time     WBC 18.1 08/02/2022 04:44 AM     RBC 4.56 08/02/2022 04:44 AM     HGB 14.1 08/02/2022 04:44 AM     HCT 42.2 08/02/2022 04:44 AM     MCV 92.5 08/02/2022 04:44 AM     MCH 30.9 08/02/2022 04:44 AM     MCHC 33.4 08/02/2022 04:44 AM     RDW 12.2 08/02/2022 04:44 AM      08/02/2022 04:44 AM     MPV 9.3 08/02/2022 04:44 AM      PT/INR:  No results found for: PROTIME, INR Radiologic review: X-rays obtained in the office today: AP lateral obliques of his right hand were obtained in the office demonstrating well-healed and consolidated fractures of the fourth and fifth metacarpals as well as the little finger proximal phalanx. Interdigital space between the index and middle fingers is appropriate. Impression office x-rays: Well-healed fractures about the ring and small fingers with hardware in place     IMPRESSION:  4 months out improving right hand status post complex injury with right ring finger scar contracture  GERD  Hypertension     PLAN:    Overall he is doing quite well. He is pleased with his progress. May continue to advance with therapy as tolerated. He has no restrictions in therapy. Swelling has continued to improve in the hand. We did discuss addressing his right ring finger contracture today. Specifically, a double Z-plasty of the right ring finger will be undertaken. We did also discussed possible FDS lasso of the right long finger MCP joint. However we will only perform the double Z-plasty of the right ring finger during the surgery and see if this corrects the hyperextension of the right long finger MCP joint. Patient will also continue hand therapy leading up to and after surgery. Risks, benefits, and alternatives were discussed with the patient and their family. Risks include but are not limited to infection, blood loss, damage to neurovascular and musculoskeletal structures,  need for further surgery,  stiffness, and catastrophic anesthesia complications. They understand and wish to proceed. Suzi Zhang,   Orthopedic surgery resident        I have seen and evaluated the patient and agree with the above assessment and plan on today's visit.  I have performed the key components of the history and physical examination with significant findings of scar contracture of ring finger as well as extensor lag of the PIP joints of middle and ring fingers. Complexity of findings were explained. Discussed Z-plasty of the scar followed by therapy. He understands that this may not address the extensor lag to the adjacent middle finger patient may require a FDS lasso procedure in the future. He voiced understanding all questions answered. . I concur with the findings and plan as documented. I explained the risks, benefits, alternatives and complications of surgery with the patient including but not limited to the risks of infection, possible damage to nerves, vessels, or tendons, stiffness, loss of range of motion, scar sensitivity, wound healing complications, worsening symptoms, possible need for therapy, as well as the possible need further surgery and unanticipated complications. The patient voiced understanding and all questions were answered. The patient elected to proceed with surgical intervention. History and Physical Update     Patient was seen and examined. Patient's history and physical was reviewed with the patient. There has been no significant interval changes.

## 2023-03-15 NOTE — BRIEF OP NOTE
Brief Postoperative Note      Patient: Wyatt Mayer Sr.   YOB: 1975  MRN: [id number]    Date of Procedure: 3/15/2023    Pre-Op Diagnosis: Contracture of joint of right hand [M24.541]  Scar condition and fibrosis of skin [L90.5]    Post-Op Diagnosis: Same       Procedure(s):  RIGHT RING FINGER DOUBLE Z PLASTY CONTRACTURE RELEASE AND MIDDLE FINGER CONTRACTURE RELEASE    Surgeon(s):  Alejandra Rubin MD    Assistant:  Physician Assistant: LISA Johnson  Resident: Viviana Laird DO    Anesthesia: Monitor Anesthesia Care    Estimated Blood Loss (mL): Minimal    Complications: None    Specimens:   * No specimens in log *    Implants:  * No implants in log *      Drains: * No LDAs found *    Findings: see op note    Electronically signed by LISA Johnson on 3/15/2023 at 8:40 AM

## 2023-03-16 ENCOUNTER — DOCUMENTATION (OUTPATIENT)
Dept: PRIMARY CARE | Facility: CLINIC | Age: 48
End: 2023-03-16
Payer: COMMERCIAL

## 2023-03-16 ENCOUNTER — PATIENT OUTREACH (OUTPATIENT)
Dept: PRIMARY CARE | Facility: CLINIC | Age: 48
End: 2023-03-16
Payer: COMMERCIAL

## 2023-03-16 DIAGNOSIS — I10 ACCELERATED HYPERTENSION: ICD-10-CM

## 2023-03-16 PROBLEM — L23.7 POISON IVY DERMATITIS: Status: ACTIVE | Noted: 2023-03-16

## 2023-03-16 PROBLEM — R40.0 DAYTIME SOMNOLENCE: Status: ACTIVE | Noted: 2023-03-16

## 2023-03-16 PROBLEM — H53.8 VISION BLURRED: Status: ACTIVE | Noted: 2023-03-16

## 2023-03-16 PROBLEM — R06.02 SHORTNESS OF BREATH: Status: ACTIVE | Noted: 2023-03-16

## 2023-03-16 PROBLEM — K21.9 GERD (GASTROESOPHAGEAL REFLUX DISEASE): Status: ACTIVE | Noted: 2023-03-16

## 2023-03-16 PROBLEM — R07.9 CHEST PAIN AT REST: Status: ACTIVE | Noted: 2023-03-16

## 2023-03-16 PROBLEM — R93.1 AGATSTON CORONARY ARTERY CALCIUM SCORE LESS THAN 100: Status: ACTIVE | Noted: 2023-03-16

## 2023-03-16 PROBLEM — G47.33 OBSTRUCTIVE SLEEP APNEA: Status: ACTIVE | Noted: 2023-03-16

## 2023-03-16 PROBLEM — R73.03 PREDIABETES: Status: ACTIVE | Noted: 2023-03-16

## 2023-03-16 PROBLEM — J32.9 SINUSITIS: Status: ACTIVE | Noted: 2023-03-16

## 2023-03-16 PROBLEM — Z91.199 NONCOMPLIANCE: Status: ACTIVE | Noted: 2023-03-16

## 2023-03-16 PROBLEM — E78.5 HYPERLIPIDEMIA: Status: ACTIVE | Noted: 2023-03-16

## 2023-03-16 LAB
EKG ATRIAL RATE: 95 BPM
EKG P AXIS: 56 DEGREES
EKG P-R INTERVAL: 154 MS
EKG Q-T INTERVAL: 352 MS
EKG QRS DURATION: 80 MS
EKG QTC CALCULATION (BAZETT): 442 MS
EKG R AXIS: 55 DEGREES
EKG T AXIS: 57 DEGREES
EKG VENTRICULAR RATE: 95 BPM

## 2023-03-16 RX ORDER — RAMIPRIL 10 MG/1
10 CAPSULE ORAL DAILY
COMMUNITY
End: 2023-03-21 | Stop reason: SDUPTHER

## 2023-03-16 RX ORDER — ALBUTEROL SULFATE 90 UG/1
1-2 AEROSOL, METERED RESPIRATORY (INHALATION) EVERY 6 HOURS PRN
COMMUNITY
Start: 2021-11-16

## 2023-03-16 RX ORDER — ATORVASTATIN CALCIUM 20 MG/1
1 TABLET, FILM COATED ORAL DAILY
COMMUNITY
Start: 2021-12-06 | End: 2023-03-21 | Stop reason: SDUPTHER

## 2023-03-16 RX ORDER — OXYCODONE AND ACETAMINOPHEN 5; 325 MG/1; MG/1
1 TABLET ORAL EVERY 6 HOURS PRN
COMMUNITY
Start: 2022-08-16 | End: 2023-07-13 | Stop reason: ALTCHOICE

## 2023-03-16 RX ORDER — LISINOPRIL 10 MG/1
20 TABLET ORAL DAILY
COMMUNITY
End: 2023-03-21 | Stop reason: SDUPTHER

## 2023-03-16 RX ORDER — OMEPRAZOLE 40 MG/1
1 CAPSULE, DELAYED RELEASE ORAL DAILY
COMMUNITY
End: 2023-03-21 | Stop reason: SDUPTHER

## 2023-03-16 NOTE — PROGRESS NOTES
Outpatient pharmacy spoke with the patient on 03/16/2023 to let him know he was discharged with out his percocet from here, his Dr sent a new prescription for percocet to [facility] in [facility] on 3/15/2023 at 5:48 pm, we reversed the prescription from here out, he can pick it up at [facility].

## 2023-03-16 NOTE — PROGRESS NOTES
Discharge Facility:[facility]  Discharge Diagnosis:Accelerated hypertension  Admission Date:3/15/2023  Discharge Date:3/15/2023    PCP appt:3/21/2023    Patient Johnny Alegria Sr. is a 47 y.o. presented with Contracture of joint of right hand [M24.541]  Scar condition and fibrosis of skin [L90.5]  Accelerated hypertension [I10]    Engagement  Call Start Time: 1036 (3/16/2023 10:36 AM)    Medications  Medications reviewed with patient/caregiver?: Yes (3/16/2023 10:36 AM)  Is the patient having any side effects they believe may be caused by any medication additions or changes?: No (3/16/2023 10:36 AM)  Does the patient have all medications ordered at discharge?: Yes (3/16/2023 10:36 AM)  Care Management Interventions: No intervention needed (3/16/2023 10:36 AM)  Prescription Comments: going to get pain meds today. he left hospital without them bringing to bedside. pharmacy has called patient and they transferred the prescription to a pharmacy closer to patient. pt is aware. (3/16/2023 10:36 AM)  Is the patient taking all medications as directed (includes completed medication regime)?: Yes (3/16/2023 10:36 AM)  Medication Comments: has not needed any pain meds. (3/16/2023 10:36 AM)    Appointments  Does the patient have a primary care provider?: Yes (3/16/2023 10:36 AM)  Care Management Interventions: Verified appointment date/time/provider (3/16/2023 10:36 AM)    Self Management  What is the home health agency?: none (3/16/2023 10:36 AM)  Has home health visited the patient within 72 hours of discharge?: Not applicable (3/16/2023 10:36 AM)    Patient Teaching  Does the patient have access to their discharge instructions?: Yes (3/16/2023 10:36 AM)  Care Management Interventions: Reviewed instructions with patient (3/16/2023 10:36 AM)  What is the patient's perception of their health status since discharge?: Improving (3/16/2023 10:36 AM)  Is the patient/caregiver able to teach back the hierarchy of who to  call/visit for symptoms/problems? PCP, Specialist, Home Health nurse, Urgent Care, ED, 911: Yes (3/16/2023 10:36 AM)    Wrap Up  Wrap Up Additional Comments: spoke with patient and he states that he is doing good. has not needed anything for pain. has appt with pcp 3/21/2023. no needs or concerns expressed. (3/16/2023 10:36 AM)  Call End Time: 1039 (3/16/2023 10:36 AM)

## 2023-03-17 PROBLEM — E66.812 CLASS 2 SEVERE OBESITY WITH SERIOUS COMORBIDITY AND BODY MASS INDEX (BMI) OF 38.0 TO 38.9 IN ADULT: Status: ACTIVE | Noted: 2023-03-17

## 2023-03-17 PROBLEM — E66.01 CLASS 2 SEVERE OBESITY WITH SERIOUS COMORBIDITY AND BODY MASS INDEX (BMI) OF 38.0 TO 38.9 IN ADULT (MULTI): Status: ACTIVE | Noted: 2023-03-17

## 2023-03-20 ENCOUNTER — HOSPITAL ENCOUNTER (OUTPATIENT)
Dept: OCCUPATIONAL THERAPY | Age: 48
Setting detail: THERAPIES SERIES
Discharge: HOME | End: 2023-03-20
Payer: COMMERCIAL

## 2023-03-20 PROCEDURE — 97760 ORTHOTIC MGMT&TRAING 1ST ENC: CPT

## 2023-03-20 PROCEDURE — L3913 HFO W/O JOINTS CF: HCPCS

## 2023-03-20 PROCEDURE — 97166 OT EVAL MOD COMPLEX 45 MIN: CPT

## 2023-03-20 NOTE — PROGRESS NOTES
[facility]     Occupational Therapy: Daily Note   Patient: Negar Hammer Sr. (46 y.o. male)   Date:   Plan of Care Certification Period:  23   :  1975  MRN: [id number]  CSN: [id number]   Insurance: Payor: [address] [facility]  [address] / Plan: [facility] [address] / Product Type: *No Product type* /   Insurance ID: [id number] - (Medicaid Managed) Secondary Insurance (if applicable):    Referring Physician: Adilia Pérez MD     PCP: Lisa Orlando MD Visits to Date: Total # of Visits to Date: 1   Progress note:Progress Note Counter: 1  Visits Approved: 27 (4 visits utilized )    No Show:    Cancelled Appts:      Medical Diagnosis: Unspecified fracture of fifth metacarpal bone, right hand, initial encounter for open fracture [S62.306B]  Displaced fracture of base of fourth metacarpal bone, right hand, initial encounter for open fracture [S62.314B] R ring double z plasty contracture release and middle digit contracture release             OBJECTIVE EXAMINATION         TREATMENT     Focus of treatment was on the following:   evaluation and splinting       Fabricated thermoplastic volar hand based resting hand splint maintaining middle, ring and little digits in extension. Wrist, index and thumb free for full mobility. Pt instructed to wear splint at all times except for HEP and to wash hand as needed. Pt. instructed to perform AROM/gentle PROM all digits HEP, 10 reps every hour to mobilize all digits. Pt. demonstrates good understanding and follow through. ASSESSMENT       Assessment: Pt tolerated treatment well. Pt. demonstrates good understanding of splint use and for HEP. All stitches intact and demonstrates good healing of incision without drainage. Pt. motivated to improve. Post Treatment Pain: Pt denies pain    Patient's Activity Tolerance: good                 GOALS    as per POC. TREATMENT PLAN   See pt.
Flexion   WFL NT [facility] NT WFL NT     Comments: RUE index and pinky digit AROM is [place] for flexion and extension. Pt. presently has limited AROM flexion MF and ring finger due to surgery. Able to obtain 50-60% PROM flexion at this time. Passive extension WNL all digits. Thumb A/PROM is WFL. Right   Left Norms    A P  A P    Thumb         MP Flexion WFL NT  WFL NT 0-70   IP Flexion WFL NT  WFL NT 0-90   Radial Abduction WFL NT  WFL NT 0-50   Palmar Adduction WFL NT  WFL NT 0-40   Comments:    Opposition  Right Hand: WFL  Left Hand: WFL    Distance Thumb Tip from Head of 5th MC: measurements cm   Right Hand: 0  Left Hand: 0    Edema  Circumferential measurements cm   Right Left   MID HAND 27.5 25.4   Wrist (across styloid) 22 22   Metacarpal Phalangeal NT NT   Mod edema ulnar side of palm RUE.  & Pinch Strength  Average of 3 tries Right Norm Left Norm    (lb) NT Male age 39-53: 103 lbs  NT Male age 39-53: 95 lbs    Rock Pinch (lb) NT Male age 39-53: 19.0 lbs  NT Male age 39-53: 18.0 lbs    Lateral Pinch (lb) NT Male age 39-53: 20.5 lbs  NT Male age 39-53: 19.0 lbs    Comments:    Coordination & Dexterity   Right Norm Left Norm   Nine Hole Peg Test  (seconds) NT Male age 39-53: 19.9 s  NT Male age 39-53: 20.1 s           Comments: Pt. able to use index and thumb RUE for fine motor tasks. Skin Integrity  6 cm Z shaped incision volar surface hand from base of MF to IP joint of RF. Stitches intact. 4 cm long dorsal incision along MC of middle digit with 8 stitches intact. Small amount of bloody effusion noted volar incision. Cognition:    No issues noted on evaluation. Sensation:     WFL    Tone:   normal tone      Joint Mobility  Stiffness noted in digits post surgery with mild edema. Palpation/Tenderness  tenderness noted along volar surface incision and with MP flexion of MF.     Education/Barriers to learning:     Barriers:none   Education on this date:  splint wear schedule and ROM

## 2023-03-21 ENCOUNTER — OFFICE VISIT (OUTPATIENT)
Dept: PRIMARY CARE | Facility: CLINIC | Age: 48
End: 2023-03-21
Payer: COMMERCIAL

## 2023-03-21 ENCOUNTER — HOSPITAL ENCOUNTER (OUTPATIENT)
Dept: OCCUPATIONAL THERAPY | Age: 48
Setting detail: THERAPIES SERIES
Discharge: HOME | End: 2023-03-21
Payer: COMMERCIAL

## 2023-03-21 VITALS
DIASTOLIC BLOOD PRESSURE: 94 MMHG | BODY MASS INDEX: 39.2 KG/M2 | HEIGHT: 71 IN | WEIGHT: 280 LBS | RESPIRATION RATE: 18 BRPM | HEART RATE: 110 BPM | TEMPERATURE: 97.8 F | SYSTOLIC BLOOD PRESSURE: 120 MMHG

## 2023-03-21 DIAGNOSIS — Z91.199 NONCOMPLIANCE: ICD-10-CM

## 2023-03-21 DIAGNOSIS — I10 ESSENTIAL HYPERTENSION: ICD-10-CM

## 2023-03-21 DIAGNOSIS — E78.5 HYPERLIPIDEMIA, UNSPECIFIED HYPERLIPIDEMIA TYPE: Primary | ICD-10-CM

## 2023-03-21 DIAGNOSIS — K21.9 GASTROESOPHAGEAL REFLUX DISEASE WITHOUT ESOPHAGITIS: ICD-10-CM

## 2023-03-21 PROBLEM — L23.7 POISON IVY DERMATITIS: Status: RESOLVED | Noted: 2023-03-16 | Resolved: 2023-03-21

## 2023-03-21 PROBLEM — R40.0 DAYTIME SOMNOLENCE: Status: RESOLVED | Noted: 2023-03-16 | Resolved: 2023-03-21

## 2023-03-21 PROBLEM — J32.9 SINUSITIS: Status: RESOLVED | Noted: 2023-03-16 | Resolved: 2023-03-21

## 2023-03-21 PROCEDURE — 97110 THERAPEUTIC EXERCISES: CPT

## 2023-03-21 PROCEDURE — 3074F SYST BP LT 130 MM HG: CPT | Performed by: FAMILY MEDICINE

## 2023-03-21 PROCEDURE — 99214 OFFICE O/P EST MOD 30 MIN: CPT | Performed by: FAMILY MEDICINE

## 2023-03-21 PROCEDURE — 3080F DIAST BP >= 90 MM HG: CPT | Performed by: FAMILY MEDICINE

## 2023-03-21 PROCEDURE — 97112 NEUROMUSCULAR REEDUCATION: CPT

## 2023-03-21 PROCEDURE — 1036F TOBACCO NON-USER: CPT | Performed by: FAMILY MEDICINE

## 2023-03-21 PROCEDURE — 97530 THERAPEUTIC ACTIVITIES: CPT

## 2023-03-21 RX ORDER — OMEPRAZOLE 40 MG/1
40 CAPSULE, DELAYED RELEASE ORAL DAILY
Qty: 30 CAPSULE | Refills: 6 | Status: SHIPPED | OUTPATIENT
Start: 2023-03-21 | End: 2023-09-22 | Stop reason: SDUPTHER

## 2023-03-21 RX ORDER — ATORVASTATIN CALCIUM 20 MG/1
20 TABLET, FILM COATED ORAL DAILY
Qty: 30 TABLET | Refills: 6 | Status: SHIPPED | OUTPATIENT
Start: 2023-03-21 | End: 2023-09-22 | Stop reason: ALTCHOICE

## 2023-03-21 RX ORDER — RAMIPRIL 10 MG/1
10 CAPSULE ORAL DAILY
Qty: 30 CAPSULE | Refills: 6 | Status: SHIPPED | OUTPATIENT
Start: 2023-03-21 | End: 2023-07-13 | Stop reason: SDUPTHER

## 2023-03-21 NOTE — PROGRESS NOTES
release. Issue HEP handouts for home reference. Discuss continued precautions and splint wear schedule for safety. Exercises/Activities:  - Hand/finger AROM mobilization program consisting of:       - Intrinsic plus position, thumb to finger opposition, PIP/DIP flexion with blocked MPs, tenodesis 20x5\" ea  - Sponge piece translation pickups x10 (issued sponge pieces tp pt for HEP use). Patient Education/HEP: Issued HEP handout consisting of: Intrinsic plus position, thumb to finger opposition, PIP/DIP flexion with blocked MPs, tenodesis. Sponge piece translation pickups x10 (issued sponge pieces tp pt for HEP use). Discussed hourly HEP performance with subsequent ice application to involved hand for edema reduction. ASSESSMENT       Assessment: Saw pt this date for intiation of AROM protocol s/p double z-plasty and flexion contracture release to multiple digits. Pt presents with increased swelling in the hand. No pitting edema present. Recommended persistent icing to hand hourly s/p AROM HEP exercises. Began AROM consisting of Intrinsic plus position, thumb to finger opposition, PIP/DIP flexion with blocked MPs, tenodesis. Sponge piece translation pickups x10. Issued HEP handout for home reference. Pt tolerates session well without increased pain. Does have very mild bloody discharge at MF/IF webspace incision. No signs of infection present. Finger flexion does improve with reps of AROM though is limited, mostly at MF MP joint. Post Treatment Pain: Pt denies pain    Patient's Activity Tolerance: Good. GOALS         Long Term Goals  Time Frame for Long Term Goals : 12 visits  Long Term Goal 1: Patient will report pain 1 or less during functional activities. Long Term Goal 2: Patient  will be indep with all recommended HEP's, adaptive strategies, and adaptive techniques.   Long Term Goal 3: Patient  will be indep with all recommended HEP's, adaptive strategies, and adaptive

## 2023-03-21 NOTE — PROGRESS NOTES
"Patient: Johnny Alegria  : 1975  PCP: David Day MD  MRN: [id number]  Program: No linked episodes     Johnny Alegria is a 47 y.o. male presenting today for follow-up after being discharged from the hospital 6 days ago. The main problem requiring admission was accelerated hypertension after right hand surgery. The discharge summary and/or Transitional Care Management documentation was reviewed. Medication reconciliation was performed as indicated via the \"Tito as Reviewed\" timestamp.   His BP was elevated  to 190's systolic.  Monitored that day at hospital and released later.  He had not taken his meds that morning.   He ran out of meds b/c did not make a fu appt with me.  My records show that he has not had a refill of medications since 11 months ago.  Was done at [facility].  No chest pain or palpitations during this.  All labs and EKG's were all good.      Johnny Alegria was contacted by Transitional Care Management services two days after his discharge. This encounter and supporting documentation was reviewed.    Also with cough since yesterday.  No tobacco use.  No Covid exposure.  No fever, chills.  He denies any other URI symptoms.    The complexity of medical decision making for this patient's transitional care is moderate.     He is supposed to be on Hyperlipidemia medications and omeprazole also.  He asks if I can refill these medications so he can restart them.    Review of Systems    Family History   Problem Relation Name Age of Onset    Other (heart artery stent) Mother      Other (cva) Father      Hypertension Father      Other (acute myocardial infraction) Father         Engagement  Call Start Time: 1036 (3/16/2023 10:36 AM)    Medications  Medications reviewed with patient/caregiver?: Yes (3/16/2023 10:36 AM)  Is the patient having any side effects they believe may be caused by any medication additions or changes?: No (3/16/2023 10:36 AM)  Does the patient have all medications " ordered at discharge?: Yes (3/16/2023 10:36 AM)  Care Management Interventions: No intervention needed (3/16/2023 10:36 AM)  Prescription Comments: going to get pain meds today. he left hospital without them bringing to bedside. pharmacy has called patient and they transferred the prescription to a pharmacy closer to patient. pt is aware. (3/16/2023 10:36 AM)  Is the patient taking all medications as directed (includes completed medication regime)?: Yes (3/16/2023 10:36 AM)  Medication Comments: has not needed any pain meds. (3/16/2023 10:36 AM)    Appointments  Does the patient have a primary care provider?: Yes (3/16/2023 10:36 AM)  Care Management Interventions: Verified appointment date/time/provider (3/16/2023 10:36 AM)    Self Management  What is the home health agency?: none (3/16/2023 10:36 AM)  Has home health visited the patient within 72 hours of discharge?: Not applicable (3/16/2023 10:36 AM)    Patient Teaching  Does the patient have access to their discharge instructions?: Yes (3/16/2023 10:36 AM)  Care Management Interventions: Reviewed instructions with patient (3/16/2023 10:36 AM)  What is the patient's perception of their health status since discharge?: Improving (3/16/2023 10:36 AM)  Is the patient/caregiver able to teach back the hierarchy of who to call/visit for symptoms/problems? PCP, Specialist, Home Health nurse, Urgent Care, ED, 911: Yes (3/16/2023 10:36 AM)    Wrap Up  Wrap Up Additional Comments: spoke with patient and he states that he is doing good. has not needed anything for pain. has appt with pcp 3/21/2023. no needs or concerns expressed. (3/16/2023 10:36 AM)  Call End Time: 1039 (3/16/2023 10:36 AM)    Plan:    Uncontrolled hypertension-we will restart him on ramipril 10 mg daily because he was doing well on this medication.  For some reason he was started on lisinopril from the hospital but I will discontinue this.  Make a follow up appointment with me for recheck in 6  months.    Hyperlipidemia-we will restart atorvastatin and follow-up in 6 months.    GERD-we will restart omeprazole daily as needed.    Medical noncompliance-I again warned the patient that he is putting himself at much higher risk for atherosclerotic disease such as heart attack and stroke because of continued noncompliance.  I strongly recommend that he take the medications as prescribed and follow-up with me at least every 6 months or face near certain Negative consequences.

## 2023-03-22 ENCOUNTER — HOSPITAL ENCOUNTER (OUTPATIENT)
Dept: OCCUPATIONAL THERAPY | Age: 48
Setting detail: THERAPIES SERIES
Discharge: HOME | End: 2023-03-22
Payer: COMMERCIAL

## 2023-03-22 NOTE — PROGRESS NOTES
Therapy                            Cancellation/No-show Note    Date: 3/22/2023  Patient Name: Te Rankin Sr.    : 1975  (50 y.o.)     MRN: [id number]    Account #: [de-identified]    No Show: 0  Canceled Appointment: 1    Comments: For today's appointment patient:  [x]  Cancelled  []  Rescheduled appointment  []  No-show   []  Called pt to remind of next appointment     Reason given by patient:  [x]  Patient ill  []  Conflicting appointment  []  No transportation    []  Conflict with work  []  No reason given  []  Other:      [x] Pt has future appointments scheduled, no follow up needed  [] Pt requests to be on hold. Reason:   If > 2 weeks please discuss with therapist.  [] Therapist to call pt for follow up     Signature:  ANIL Graff 3/22/2023 8:56 AM

## 2023-03-23 ENCOUNTER — OFFICE VISIT (OUTPATIENT)
Dept: ORTHOPEDIC SURGERY | Age: 48
End: 2023-03-23

## 2023-03-23 VITALS — HEIGHT: 71 IN | WEIGHT: 270 LBS | BODY MASS INDEX: 37.8 KG/M2

## 2023-03-23 DIAGNOSIS — S62.314B: ICD-10-CM

## 2023-03-23 DIAGNOSIS — S62.306B OPEN DISPLACED FRACTURE OF FIFTH METACARPAL BONE OF RIGHT HAND, UNSPECIFIED PORTION OF METACARPAL, INITIAL ENCOUNTER: Primary | ICD-10-CM

## 2023-03-23 PROCEDURE — 99024 POSTOP FOLLOW-UP VISIT: CPT | Performed by: ORTHOPAEDIC SURGERY

## 2023-03-23 RX ORDER — OMEPRAZOLE 40 MG/1
40 CAPSULE, DELAYED RELEASE ORAL DAILY
COMMUNITY
Start: 2023-03-21

## 2023-03-23 NOTE — PROGRESS NOTES
HPI:   Patient follows up today s/p 1 week right ring finger double Z-plasty and right middle finger MCP contracture release. No complaints. Pain well controlled. Inquires about new immobilizer due to current inefficiencies of current immobilizer. Would like a new one. Physical Exam:  right dorsal and volar incision clean, dry, and intact. Sutures intact with great skin approximation. No evidence of drainage. No signs of infection. Mild contracture at the middle and ring finger. NVI  Radial, median, and ulnar sensation intact to light touch  Brisk capillary refill  Muscle strength 5/5 grossly      Assessment:  Post-op  1 week right ring finger double Z-plasty and right middle finger MCP contracture release    Plan:  Modified fiberglass splint provided in office. After visit summary updated to inform patient's preferred physical therapist of suture removal next week 3/29. Continue to weight-bear as tolerated. Encourage continue mobilization of the middle finger to decrease potential contracture and stiffness. Ring finger will remain immobilized in extension to prevent contracture on the palmar aspect. Educated on scar management. Follow up 4  weeks with x-rays      3/23/2023  Linsey Humphrey DO     I have seen and evaluated the patient and agree with the above assessment and plan on today's visit. I have performed the key components of the history and physical examination with significant findings of postop Z-plasty of ring finger and MCP joint extension contracture release. His therapy splint had immobilize the MCP joint extension has resulted in a significant recurrence of his extension contracture. His splint was modified to allow range of motion at the MCP joint of the middle finger while maintaining finger extension to the ring finger. Patient was also provided specific exercises to improve MCP joint flexion of the middle finger and exercises to maintain scar extension to the ring finger. . I

## 2023-03-24 ENCOUNTER — PATIENT OUTREACH (OUTPATIENT)
Dept: PRIMARY CARE | Facility: CLINIC | Age: 48
End: 2023-03-24
Payer: COMMERCIAL

## 2023-03-24 NOTE — PROGRESS NOTES
Unable to reach patient for call back after patient's follow up appointment with PCP.  Left voicemail with call back number for patient if any questions or concerns do arise.

## 2023-03-27 ENCOUNTER — HOSPITAL ENCOUNTER (OUTPATIENT)
Dept: OCCUPATIONAL THERAPY | Age: 48
Setting detail: THERAPIES SERIES
Discharge: HOME | End: 2023-03-27
Payer: COMMERCIAL

## 2023-03-27 PROCEDURE — 97530 THERAPEUTIC ACTIVITIES: CPT

## 2023-03-27 PROCEDURE — 97760 ORTHOTIC MGMT&TRAING 1ST ENC: CPT

## 2023-03-27 NOTE — PROGRESS NOTES
[facility]CC[facility]ER[facility]     Occupational Therapy: Daily Note   Patient: Denna Moritz Sr. (46 y.o. male)   Date:   Plan of Care Certification Period:  23   :  1975  MRN: [id number]  CSN: [id number]   Insurance: Payor: [address] [facility]  [address] / Plan: [address] [facility] [address] / Product Type: *No Product type* /   Insurance ID: [id number] - (Medicaid Managed) Secondary Insurance (if applicable):    Referring Physician: Sulema Livingston MD     PCP: Tio Escalante MD Visits to Date: Total # of Visits to Date: 4   Progress note:Progress Note Counter: 4  Visits Approved: 30    No Show: 0  Cancelled Appts:1     Medical Diagnosis: Unspecified fracture of fifth metacarpal bone, right hand, initial encounter for open fracture [S62.306B]  Displaced fracture of base of fourth metacarpal bone, right hand, initial encounter for open fracture [S62.314B] R ring double z plasty contracture release and middle digit contracture release        Therapy Time 46    Time in 0907   Time out 0953   Total treatment minutes 46   Total time code minutes           OT Splint/orthosis fit and training 25 minutes for 1 unit(s), CPT 38828   OT Therapeutic activities 21 minutes for 1 unit(s), CPT 12321       SUBJECTIVE EXAMINATION     Patient's date of birth confirmed: Yes     Pain Level:   Pt denies pain    Patient Comments:   Pt states saw his surgeon last week and was told to have OT remove sutures from the back of the hand and then the rest can come out after 3/29. States surgeon does not want MF included in splint because he wants \"to let it start pulling down. \" Per pt report. Learning/Language barrier: no       HEP/Strategies/Orthosis Compliance: Pt states has been compliant with HEP and wearing splint as instructed. Restrictions: Keep incisions dry at this time. Monitor incisions for signs of infection as well as dehiscing.             OBJECTIVE EXAMINATION         TREATMENT

## 2023-03-28 ENCOUNTER — HOSPITAL ENCOUNTER (OUTPATIENT)
Dept: OCCUPATIONAL THERAPY | Age: 48
Setting detail: THERAPIES SERIES
Discharge: HOME | End: 2023-03-28
Payer: COMMERCIAL

## 2023-03-28 PROCEDURE — 97112 NEUROMUSCULAR REEDUCATION: CPT

## 2023-03-28 PROCEDURE — 97140 MANUAL THERAPY 1/> REGIONS: CPT

## 2023-03-28 NOTE — PROGRESS NOTES
[facility]     Occupational Therapy: Daily Note   Patient: Daniella Gauthier Sr. (46 y.o. male)   Date:   Plan of Care Certification Period:  23   :  1975  MRN: [id number]  CSN: [id number]   Insurance: Payor: [address] [facility]  [address] / Plan: [address] [facility] [address] / Product Type: *No Product type* /   Insurance ID: [id number] - (Medicaid Managed) Secondary Insurance (if applicable):    Referring Physician: Charu Huffman MD     PCP: Blaine Kruse MD Visits to Date: Total # of Visits to Date: 5   Progress note:Progress Note Counter: 5  Visits Approved: 30    No Show: 0  Cancelled Appts:1     Medical Diagnosis: Unspecified fracture of fifth metacarpal bone, right hand, initial encounter for open fracture [S62.306B]  Displaced fracture of base of fourth metacarpal bone, right hand, initial encounter for open fracture [S62.314B] R ring double z plasty contracture release and middle digit contracture release        Therapy Time 40    Time in 0900   Time out 0940   Total treatment minutes 40   Total time code minutes           OT Manual therapy 30 minutes for 2 unit(s), CPT 93412  OT Neuromuscular 10 minutes for 1 unit(s), CPT 22526       SUBJECTIVE EXAMINATION     Patient's date of birth confirmed: Yes     Pain Level:   Pt denies pain    Patient Comments:   Pt states hand splint had been slipping a little. Says wound is doing well. No drainage. States compliance with home exercises. Learning/Language barrier: no       HEP/Strategies/Orthosis Compliance: Pt states has been compliant with splint wearing though states it has been slipping a little bit. States has been diligent with AROM exercises. Restrictions: To continue with hand based splint as well as ROM HEP as instructed including AROM and gentle PROM. To keep incisions dry at this time.            OBJECTIVE EXAMINATION         TREATMENT     Focus of treatment was on the following:

## 2023-03-29 ENCOUNTER — HOSPITAL ENCOUNTER (OUTPATIENT)
Dept: OCCUPATIONAL THERAPY | Age: 48
Setting detail: THERAPIES SERIES
Discharge: HOME | End: 2023-03-29
Payer: COMMERCIAL

## 2023-03-29 PROCEDURE — 97530 THERAPEUTIC ACTIVITIES: CPT

## 2023-03-29 PROCEDURE — 97140 MANUAL THERAPY 1/> REGIONS: CPT

## 2023-03-29 NOTE — PROGRESS NOTES
[facility]     Occupational Therapy: Daily Note   Patient: Natasha Sutton Sr. (46 y.o. male)   Date:   Plan of Care Certification Period:  23   :  1975  MRN: [id number]  CSN: [id number]   Insurance: Payor: [address] [facility]  [address] / Plan: [address] [facility] [address] / Product Type: *No Product type* /   Insurance ID: [id number] - (Medicaid Managed) Secondary Insurance (if applicable):    Referring Physician: Agapito Patel MD     PCP: Laura Fofana MD Visits to Date: Total # of Visits to Date: 6   Progress note:Progress Note Counter: 6  Visits Approved: 30    No Show: 0  Cancelled Appts:1     Medical Diagnosis: Unspecified fracture of fifth metacarpal bone, right hand, initial encounter for open fracture [S62.306B]  Displaced fracture of base of fourth metacarpal bone, right hand, initial encounter for open fracture [S62.314B] R ring double z plasty contracture release and middle digit contracture release        Therapy Time 48    Time in 0902   Time out 0950   Total treatment minutes 48   Total time code minutes           OT Manual therapy 16 minutes for 1 unit(s), CPT 76676  OT Therapeutic activities 32 minutes for 2 unit(s), CPT 61324       SUBJECTIVE EXAMINATION     Patient's date of birth confirmed: Yes     Pain Level:   Pt denies pain    Patient Comments:   Pt states he is doing well. No pain or issues with incisions. Reporting improved fit of orthotic. \"I think it's moving much better now. It's a lot straighter. \"         Learning/Language barrier: no       HEP/Strategies/Orthosis Compliance: Pt reporting compliance with orthotic as well as AROM. Restrictions: Splint to be worn outside of HEP and self care. No heavy use of involved hand.            OBJECTIVE EXAMINATION         TREATMENT     Focus of treatment was on the following:   Suture removal as well as continued mobilization of involved digits for improved functional

## 2023-03-30 ENCOUNTER — HOSPITAL ENCOUNTER (OUTPATIENT)
Dept: OCCUPATIONAL THERAPY | Age: 48
Setting detail: THERAPIES SERIES
Discharge: HOME | End: 2023-03-30
Payer: COMMERCIAL

## 2023-03-30 PROCEDURE — 97140 MANUAL THERAPY 1/> REGIONS: CPT

## 2023-03-30 PROCEDURE — 97110 THERAPEUTIC EXERCISES: CPT

## 2023-04-03 ENCOUNTER — HOSPITAL ENCOUNTER (OUTPATIENT)
Dept: OCCUPATIONAL THERAPY | Age: 48
Setting detail: THERAPIES SERIES
Discharge: HOME | End: 2023-04-03
Payer: COMMERCIAL

## 2023-04-03 PROCEDURE — 97140 MANUAL THERAPY 1/> REGIONS: CPT

## 2023-04-03 PROCEDURE — 97530 THERAPEUTIC ACTIVITIES: CPT

## 2023-04-03 NOTE — PROGRESS NOTES
Goal 8: Pt. will be able to perform all ADL independently         TREATMENT PLAN   Continue POC           Electronically signed by YOBANI Maxwell/L  on 4/3/2023 at 9:56 AM

## 2023-04-05 ENCOUNTER — HOSPITAL ENCOUNTER (OUTPATIENT)
Dept: OCCUPATIONAL THERAPY | Age: 48
Setting detail: THERAPIES SERIES
Discharge: HOME | End: 2023-04-05
Payer: COMMERCIAL

## 2023-04-05 PROCEDURE — L3913 HFO W/O JOINTS CF: HCPCS

## 2023-04-05 PROCEDURE — 97760 ORTHOTIC MGMT&TRAING 1ST ENC: CPT

## 2023-04-05 NOTE — PROGRESS NOTES
composite flexion           Patient Education/HEP:   Recommend use of static progressive splint 2x daily for 30 min sessions with increased flexion to tolerance in 5 minute intervals. This is in addition to resting hand splint schedule and AROM exercises. ASSESSMENT       Assessment: Due to Mp capsular stiffness in the MF,  fabricated dynamic static progresive meritt splint HFO () to facilitate MF composite flexion. Pt endorses good fit. Educated pt on use with HEP, recommend use of static progressive splint 2x daily for 30 min sessions with increased flexion to tolerance in 5 minute intervals. This is in addition to resting hand splint schedule and AROM exercises. Pt demos good understanding of new splint and education. Post Treatment Pain: Pt denies pain    Patient's Activity Tolerance: Good                 GOALS         Long Term Goals  Time Frame for Long Term Goals : 12 visits  Long Term Goal 1: Patient will report pain 1 or less during functional activities. Long Term Goal 2: Patient  will be indep with all recommended HEP's, adaptive strategies, and adaptive techniques. Long Term Goal 3: Patient  will be indep with all recommended HEP's, adaptive strategies, and adaptive techniques. Long Term Goal 4: Patient  will increase R  strength to 75% norm range for age and gender  Long Term Goal 5: Patient  will increase R pinch strength to 75% norm or greater for age and gender. Long Term Goal 6: Patient  will increase dexterity in R hand as observed by indep. buttoning and tying clothing.   Long Term Goal 7: Patient will be indep with donning orthotic device, maintenance, and schedule  Long Term Goal 8: Pt. will be able to perform all ADL independently         TREATMENT PLAN   Continue POC           Electronically signed by Jazlyn aCrmona OT  on 4/5/2023 at 10:04 AM

## 2023-04-06 ENCOUNTER — HOSPITAL ENCOUNTER (OUTPATIENT)
Dept: OCCUPATIONAL THERAPY | Age: 48
Setting detail: THERAPIES SERIES
Discharge: HOME | End: 2023-04-06
Payer: COMMERCIAL

## 2023-04-06 PROCEDURE — 97110 THERAPEUTIC EXERCISES: CPT

## 2023-04-06 PROCEDURE — 97530 THERAPEUTIC ACTIVITIES: CPT

## 2023-04-06 PROCEDURE — 97140 MANUAL THERAPY 1/> REGIONS: CPT

## 2023-04-06 NOTE — PROGRESS NOTES
90 NT 55 NT   Ring                 Extension   WFL NT -39 NT WFL NT   Flexion    44 NT 76 NT 35 NT      & Pinch Strength  Average of 3 tries Right Norm Left Norm    (lb) 26.6 Male age 39-53: Burleson Graham lbs  80 Male age 39-53: 95 lbs      Coordination & Dexterity    Right Norm Left Norm   Nine Hole Peg Test  (seconds) 21.98 Male age 39-53: 19.9 s  14.54 Male age 39-53: 20.1 s        TREATMENT     Focus of treatment was on the following: Address scar and joint stiffness as well as scar mobility for improved digt ROM and functional grasp/manipulation. MFR/Manual:   General PROM for both MF/RF flexion/extension as well as low grade traction to MF MP to address joint contracture. Pt tolerates well, does report increased tightness in extnesor at proximal phalanx level. Exercises/Activities:  - Re-assessment: ROM,  strength, dexterity  - Theraputty (yellow): gripping, weight bearing, marble manipulation (x10), finger extension         Patient Education/HEP:   Recommend continued use of static progressive splint as well as resting hand splint with suggested HEP ROM exercises. Issued pink putty for /pinch activities with HP.       ASSESSMENT       Assessment: Pt re-assessed today with improvements in ROM noted. Right hand  strength is markedly weak compared to L hand. Pt does report improving function overall as well as compliance with splinting and AROM HEP. Began light theraputty activities for /finger extension and fine motor manipulation tasks. Issued pink putty for HEP use as well as recommend continued use of splinting protocols. Post Treatment Pain: Pt denies pain    Patient's Activity Tolerance: Good. GOALS         Long Term Goals  Time Frame for Long Term Goals : 12 visits  Long Term Goal 1: Patient will report pain 1 or less during functional activities.   Long Term Goal 2: Patient  will be indep with all recommended HEP's, adaptive strategies, and adaptive
___________________________   Date:_______                                                                   Acosta Landaverde MD        Physician Comments: _______________________________________________    Please sign and return to Radha. Please fax to the location listed below. Merlyn Marino for this referral!          If you have any questions or concerns, please don't hesitate to call.

## 2023-04-17 ENCOUNTER — HOSPITAL ENCOUNTER (OUTPATIENT)
Dept: OCCUPATIONAL THERAPY | Age: 48
Setting detail: THERAPIES SERIES
Discharge: HOME | End: 2023-04-17
Payer: COMMERCIAL

## 2023-04-17 PROCEDURE — 97035 APP MDLTY 1+ULTRASOUND EA 15: CPT

## 2023-04-17 PROCEDURE — 97760 ORTHOTIC MGMT&TRAING 1ST ENC: CPT

## 2023-04-17 PROCEDURE — 97140 MANUAL THERAPY 1/> REGIONS: CPT

## 2023-04-17 NOTE — PROGRESS NOTES
[facility]     Occupational Therapy: Daily Note   Patient: Samir Benavides Sr. (46 y.o. male)   Date:   Plan of Care Certification Period:  23   :  1975  MRN: [id number]  CSN: [id number]   Insurance: Payor: [address] [facility]  [address] / Plan: [address] [facility] [address] / Product Type: *No Product type* /   Insurance ID: [id number] - (Medicaid Managed) Secondary Insurance (if applicable):    Referring Physician: Diamond Matamoros MD     PCP: Rickie Davidson MD Visits to Date: Total # of Visits to Date: 14   Progress note:Progress Note Counter: 14  Visits Approved: 30    No Show: 0  Cancelled Appts:2     Medical Diagnosis: Unspecified fracture of fifth metacarpal bone, right hand, initial encounter for open fracture [S62.306B]  Displaced fracture of base of fourth metacarpal bone, right hand, initial encounter for open fracture [S62.314B] R ring double z plasty contracture release and middle digit contracture release        Therapy Time 48    Time in 0800   Time out 0848   Total treatment minutes 48   Total time code minutes           OT Manual therapy 16 minutes for 1 unit(s), CPT 11031  OT Splint/orthosis fit and training 14 minutes for 1 unit(s), CPT 26403   OT Ultrasound 18 minutes for 1 unit(s), CPT 36248       SUBJECTIVE EXAMINATION     Patient's date of birth confirmed: Yes     Pain Level:   Pt denies pain    Patient Comments:   Pt states has been using his hand throughout the day. Had some increased pain in volar scar after last therapy session but got better since. Feels scar is doing much better. Learning/Language barrier: no       HEP/Strategies/Orthosis Compliance: Pt states has been using resting hand splint sporadically throughout the day. Restrictions: None.            OBJECTIVE EXAMINATION         TREATMENT     Focus of treatment was on the following:   Fix static progressive splint for improved HEP compliance as well as address

## 2023-04-20 ENCOUNTER — HOSPITAL ENCOUNTER (OUTPATIENT)
Dept: OCCUPATIONAL THERAPY | Age: 48
Setting detail: THERAPIES SERIES
Discharge: HOME | End: 2023-04-20
Payer: COMMERCIAL

## 2023-04-20 PROCEDURE — 97140 MANUAL THERAPY 1/> REGIONS: CPT

## 2023-04-20 PROCEDURE — 97530 THERAPEUTIC ACTIVITIES: CPT

## 2023-04-20 PROCEDURE — 97035 APP MDLTY 1+ULTRASOUND EA 15: CPT

## 2023-04-20 NOTE — PROGRESS NOTES
maintenance, and schedule  Long Term Goal 8: Pt. will be able to perform all ADL independently         TREATMENT PLAN   2 x week for 2-3 weeks to address fine motor manipulation and functional control/strength of R hand. Pt. to follow up with surgeon next week.            Electronically signed by ANIL Jones  on 4/20/2023 at 9:20 AM

## 2023-04-24 ENCOUNTER — HOSPITAL ENCOUNTER (OUTPATIENT)
Dept: OCCUPATIONAL THERAPY | Age: 48
Setting detail: THERAPIES SERIES
Discharge: HOME | End: 2023-04-24
Payer: COMMERCIAL

## 2023-04-24 PROCEDURE — 97112 NEUROMUSCULAR REEDUCATION: CPT

## 2023-04-24 PROCEDURE — 97530 THERAPEUTIC ACTIVITIES: CPT

## 2023-04-24 PROCEDURE — 97140 MANUAL THERAPY 1/> REGIONS: CPT

## 2023-04-24 NOTE — PROGRESS NOTES
[facility]     Occupational Therapy: Daily Note   Patient: Narendra Medrano Sr. (46 y.o. male)   Date:   Plan of Care Certification Period:  23   :  1975  MRN: [id number]  CSN: [id number]   Insurance: Payor: [address] [facility]  [address] / Plan: [address] [facility] [address] / Product Type: *No Product type* /   Insurance ID: [id number] - (Medicaid Managed) Secondary Insurance (if applicable):    Referring Physician: Leonidas Haskins MD     PCP: Rafiq Garibay MD Visits to Date: Total # of Visits to Date: 15   Progress note:Progress Note Counter: 15  Visits Approved: 30    No Show: 0  Cancelled Appts:      Medical Diagnosis: Unspecified fracture of fifth metacarpal bone, right hand, initial encounter for open fracture [S62.306B]  Displaced fracture of base of fourth metacarpal bone, right hand, initial encounter for open fracture [S62.314B] R ring double z plasty contracture release and middle digit contracture release        Therapy Time 56    Time in 0902   Time out 0958   Total treatment minutes 56   Total time code minutes           OT Manual therapy 20 minutes for 1 unit(s), CPT 20531  OT Neuromuscular 10 minutes for 1 unit(s), CPT 53924  OT Therapeutic activities 26 minutes for 2 unit(s), CPT 31665       SUBJECTIVE EXAMINATION     Patient's date of birth confirmed: Yes     Pain Level:   Pt denies pain    Patient Comments:   Pt states his fingers are more stiff this morning. 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 Learning/Language barrier: no       HEP/Strategies/Orthosis Compliance: Encouraged continued use of static progressive splint 2-3x daily as well as use of resting hand splint and AROM regimen as previously educated on. Discussed possible use/benefits of CPM. Recommend discussing with hand surgeon at tomorrow's follow-up. Restrictions: None.            OBJECTIVE EXAMINATION   Hand Range of Motion
_______________________________________________    Please sign and return to Radha. Please fax to the location listed below. Bebe Degroot for this referral!          If you have any questions or concerns, please don't hesitate to call.   Thank you for your referral!

## 2023-04-25 ENCOUNTER — TREATMENT (OUTPATIENT)
Dept: OCCUPATIONAL THERAPY | Age: 48
End: 2023-04-25
Payer: COMMERCIAL

## 2023-04-25 ENCOUNTER — OFFICE VISIT (OUTPATIENT)
Dept: ORTHOPEDIC SURGERY | Age: 48
End: 2023-04-25

## 2023-04-25 DIAGNOSIS — M24.549 CONTRACTURE OF JOINT OF FINGER DUE TO SCAR: Primary | ICD-10-CM

## 2023-04-25 DIAGNOSIS — L90.5 CONTRACTURE OF JOINT OF FINGER DUE TO SCAR: Primary | ICD-10-CM

## 2023-04-25 PROCEDURE — 97760 ORTHOTIC MGMT&TRAING 1ST ENC: CPT | Performed by: OCCUPATIONAL THERAPIST

## 2023-04-25 PROCEDURE — 99024 POSTOP FOLLOW-UP VISIT: CPT | Performed by: ORTHOPAEDIC SURGERY

## 2023-04-25 PROCEDURE — 97530 THERAPEUTIC ACTIVITIES: CPT | Performed by: OCCUPATIONAL THERAPIST

## 2023-04-25 NOTE — PROGRESS NOTES
OCCUPATIONAL THERAPY EVALUATION/Splint Application Only   Phone: [phone]   Fax: [phone]     Date:  2023      Patient Name:  Karrie Chen Sr.    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 Floresita Huang MD  Specific Practitioner Orders: Right hand dorsal blocking splint, mcp joints    Past Medical History:   Past Medical History:   Diagnosis Date    Contracture of finger joint, right     ring    GERD (gastroesophageal reflux disease)     Hypertension      Past Surgical History:   Past Surgical History:   Procedure Laterality Date    HAND SURGERY Right 2022    HAND OPEN REDUCTION INTERNAL FIXATION,  INCISION AND DRAINAGE performed by Floresita Huang MD at [address] [place] [address]    HAND SURGERY Right 3/15/2023    RIGHT RING FINGER DOUBLE Z PLASTY CONTRACTURE RELEASE AND MIDDLE FINGER CONTRACTURE RELEASE performed by Floresita Huang MD at [address] [place] [address]       Reason for Referral: Pt is a pleasant 50year old male presenting to outpatient occupational therapy s/p R RF Double Z-Plasty and R MF MCP Contracture Release on 3/15/23. He has been attending OT in [facility]. During today's follow up with Dr. Williams Gardner, physician requested therapist visit to discuss fabrication of a hand-based MCP joint extension blocking splint to increase functional use of the hand as pt was demonstrating HE deformities at the MCP's of MF/RF impacting active extension at the PIP's. Pt presents today for splint fabrication only and referred back to OT in his home town to continue further functional progression of the R hand.     Splint Fabricated/Provided: Anti-claw splint for MF/RF using figure 8

## 2023-04-25 NOTE — PROGRESS NOTES
HPI:   Patient follows up today s/p 6 week right ring finger double Z-plasty and right middle finger MCP contracture release. Patient states soon after appointment, splint was adjusted to to more dynamic passive that facilitated middle finger flexion. He states supination and ring finger flexion contracture at the PIPJ. He does not report any pain. He states daily gentle stretching at the middle and ring finger. Physical Exam:  right dorsal and volar incision clean, dry, and intact. Scar well-healed. Middle finger and ring finger PIPJ flexion contracture approximately 90 degrees MCP remains neutral.  PIPJ passively corrected. Patient able to make a fist with slight MCPJ  Negative Bunnels test, Negative Lalit's test  NVI  Radial, median, and ulnar sensation intact to light touch  Brisk capillary refill  Muscle strength 5/5 grossly      Assessment:  Post-op  6 week right ring finger double Z-plasty and right middle finger MCP contracture release    Plan:  Consider occupational therapy to provide modified splint. Patient encouraged to continue gentle stretching at the ring  and  middle finger. He is to continue occupational therapy. Patient will return in approximate 6 weeks for repeat evaluation as needed or if no signs of improvement. Patient can continue to weight-bear as tolerated. 4/25/2023  Marena Dandy, DO     I have seen and evaluated the patient and agree with the above assessment and plan on today's visit. I have performed the key components of the history and physical examination with significant findings of 6 weeks postop ring finger Z-plasty and contracture release with concurrent adjacent middle finger MCP joint extension contracture release. His postoperative course was uncomplicated with difficulties with splinting and bracing. He has recurrence of his MCP joint extension contracture to the adjacent middle finger.   He does have hyperextension deformities of the MCP joints and

## 2023-04-27 ENCOUNTER — PATIENT OUTREACH (OUTPATIENT)
Dept: PRIMARY CARE | Facility: CLINIC | Age: 48
End: 2023-04-27
Payer: COMMERCIAL

## 2023-04-27 ENCOUNTER — HOSPITAL ENCOUNTER (OUTPATIENT)
Dept: OCCUPATIONAL THERAPY | Age: 48
Setting detail: THERAPIES SERIES
Discharge: HOME | End: 2023-04-27
Payer: COMMERCIAL

## 2023-04-27 PROCEDURE — 97530 THERAPEUTIC ACTIVITIES: CPT

## 2023-04-27 PROCEDURE — 97035 APP MDLTY 1+ULTRASOUND EA 15: CPT

## 2023-04-27 PROCEDURE — 97140 MANUAL THERAPY 1/> REGIONS: CPT

## 2023-04-27 NOTE — PROGRESS NOTES
Unable to reach patient for call back 30 days after last call.   LVM with call back number for patient to call if needed   If no voicemail available call attempts x 2 were made to contact the patient to assist with any questions or concerns patient may have.

## 2023-04-27 NOTE — PROGRESS NOTES
[facility]     Occupational Therapy: Daily Note   Patient: Kacy Gay Sr. (46 y.o. male)   Date:   Plan of Care Certification Period:  23   :  1975  MRN: [id number]  CSN: [id number]   Insurance: Payor: [facility] [facility]  [address] / Plan: [address] [facility] [address] / Product Type: *No Product type* /   Insurance ID: [id number] - (Medicaid Managed) Secondary Insurance (if applicable):    Referring Physician: Gypsy Bobby MD     PCP: Rosey Hernandez MD Visits to Date: Total # of Visits to Date: 12   Progress note:Progress Note Counter: 16  Visits Approved: 30    No Show: 0  Cancelled Appts:      Medical Diagnosis: Unspecified fracture of fifth metacarpal bone, right hand, initial encounter for open fracture [S62.306B]  Displaced fracture of base of fourth metacarpal bone, right hand, initial encounter for open fracture [S62.314B] R ring double z plasty contracture release and middle digit contracture release        Therapy Time     Time in 0800   Time out 0900   Total treatment minutes 60   Total time code minutes  60 Minutes        OT Manual therapy 15 minutes for 1 unit(s), CPT 93204  OT Therapeutic activities 37 minutes for 2 unit(s), CPT 98951  OT Ultrasound 8 minutes for 1 unit(s), CPT 91763       SUBJECTIVE EXAMINATION     Patient's date of birth confirmed: Yes     Pain Level:   Pt denies pain    Patient Comments: \"The doctor said I am doing pretty good with this and wants me to wear this new splint during the day. \"     Learning/Language barrier: no,        HEP/Strategies/Orthosis Compliance: Patient verbally confirmed compliant with HEP's Patient demo understanding.  Patient with excellent follow through        Restrictions: none         OBJECTIVE EXAMINATION         TREATMENT     Focus of treatment was on the following:   ROM R hand, all digits and wrist.      MFR/Manual:   Joint mob MP of middle digit, followed with PROM

## 2023-05-01 ENCOUNTER — HOSPITAL ENCOUNTER (OUTPATIENT)
Dept: OCCUPATIONAL THERAPY | Age: 48
Setting detail: THERAPIES SERIES
Discharge: HOME | End: 2023-05-01
Payer: COMMERCIAL

## 2023-05-01 PROCEDURE — 97530 THERAPEUTIC ACTIVITIES: CPT

## 2023-05-01 PROCEDURE — 97760 ORTHOTIC MGMT&TRAING 1ST ENC: CPT

## 2023-05-01 PROCEDURE — 97140 MANUAL THERAPY 1/> REGIONS: CPT

## 2023-05-01 NOTE — PROGRESS NOTES
[facility]     Occupational Therapy: Daily Note   Patient: Macrina Mallory Sr. (46 y.o. male)   Date:   Plan of Care Certification Period:  23   :  1975  MRN: [id number]  CSN: [id number]   Insurance: Payor: [address] [facility]  [address] / Plan: [address] [facility] [address] / Product Type: *No Product type* /   Insurance ID: [id number] - (Medicaid Managed) Secondary Insurance (if applicable):    Referring Physician: Johnathan Kahn MD     PCP: Nii Cornell MD Visits to Date: Total # of Visits to Date: 17   Progress note:Progress Note Counter: 16  Visits Approved: 30    No Show: 0  Cancelled Appts:2     Medical Diagnosis: Unspecified fracture of fifth metacarpal bone, right hand, initial encounter for open fracture [S62.306B]  Displaced fracture of base of fourth metacarpal bone, right hand, initial encounter for open fracture [S62.314B] R ring double z plasty contracture release and middle digit contracture release        Therapy Time 42    Time in 1003   Time out 1045   Total treatment minutes 42   Total time code minutes           OT Manual therapy 12 minutes for 1 unit(s), CPT 83230  OT Splint/orthosis fit and training 20 minutes for 1 unit(s), CPT 12988   OT Therapeutic activities 10 minutes for 1 unit(s), CPT 91749       SUBJECTIVE EXAMINATION     Patient's date of birth confirmed: Pt verbally unable, verified via wrist band     Pain Level:   Pt denies pain    Patient Comments:   Pt states dorsal block figure-8 splint has loosened since getting it. Says the therapist that fabricated it told him he would eventually need a new one. Learning/Language barrier: no       HEP/Strategies/Orthosis Compliance: Has been using dorsal blocking splint though states has gotten much looser        Restrictions: None. OBJECTIVE EXAMINATION         TREATMENT     Focus of treatment was on the following:    Address soft tissue and capsular restrictions as well as

## 2023-05-04 ENCOUNTER — HOSPITAL ENCOUNTER (OUTPATIENT)
Dept: OCCUPATIONAL THERAPY | Age: 48
Setting detail: THERAPIES SERIES
Discharge: HOME | End: 2023-05-04
Payer: COMMERCIAL

## 2023-05-04 PROCEDURE — 97530 THERAPEUTIC ACTIVITIES: CPT

## 2023-05-04 PROCEDURE — 97035 APP MDLTY 1+ULTRASOUND EA 15: CPT

## 2023-05-04 PROCEDURE — 97140 MANUAL THERAPY 1/> REGIONS: CPT

## 2023-05-04 NOTE — PROGRESS NOTES
[facility] OCCUPATIONAL THERAPY     Occupational Therapy: Daily Note   Patient: Darin Simmons Sr. (46 y.o. male)   Date:   Plan of Care Certification Period:  23   :  1975  MRN: [id number]  CSN: [id number]   Insurance: Payor: [address] [facility]  [address] / Plan: [address] [facility] [address] / Product Type: *No Product type* /   Insurance ID: [id number] - (Medicaid Managed) Secondary Insurance (if applicable):    Referring Physician: Moni Su MD     PCP: Tamara Dias MD Visits to Date: Total # of Visits to Date: 18   Progress note:Progress Note Counter: 18  Visits Approved: 30    No Show: 0  Cancelled Appts:2     Medical Diagnosis: Unspecified fracture of fifth metacarpal bone, right hand, initial encounter for open fracture [S62.306B]  Displaced fracture of base of fourth metacarpal bone, right hand, initial encounter for open fracture [S62.314B] R ring double z plasty contracture release and middle digit contracture release        Therapy Time     Time in 09   Time out 1000   Total treatment minutes 56   Total time code minutes  56 Minutes        OT Manual therapy 11 minutes for 1 unit(s), CPT 42762  OT Therapeutic activities 37 minutes for 2 unit(s), CPT 04230  OT Ultrasound, 8 min for 1 unit, CPT 37800     SUBJECTIVE EXAMINATION     Patient's date of birth confirmed: Yes     Pain Level:   Pt denies pain  Location: NA   Description: NA    Patient Comments: \"The new splint is working so much better, I can flex and extend better. \"  Pt. reports improved comfort and fit tailorsplint dorsal blocking splint. Learning/Language barrier: n/a       HEP/Strategies/Orthosis Compliance: Patient verbally confirmed compliant with HEP's Patient demo understanding.  Patient with excellent follow through        Restrictions: none         OBJECTIVE EXAMINATION     TREATMENT     Focus of treatment was on the following:   improving scar mobility and capsular motion to increase

## 2023-05-17 ENCOUNTER — HOSPITAL ENCOUNTER (OUTPATIENT)
Dept: OCCUPATIONAL THERAPY | Age: 48
Setting detail: THERAPIES SERIES
Discharge: HOME | End: 2023-05-17
Payer: COMMERCIAL

## 2023-05-17 PROCEDURE — 97530 THERAPEUTIC ACTIVITIES: CPT

## 2023-05-17 PROCEDURE — 97140 MANUAL THERAPY 1/> REGIONS: CPT

## 2023-05-17 NOTE — PROGRESS NOTES
[facility] OCCUPATIONAL THERAPY     Occupational Therapy: Daily Note   Patient: Narendra Medrano Sr. (46 y.o. male)   Date:   Plan of Care Certification Period:  23   :  1975  MRN: [id number]  CSN: [id number]   Insurance: Payor: [address] [place] [address]  [address] / Plan: [address] [place] [address] [address] / Product Type: *No Product type* /   Insurance ID: [id number] - (Medicaid Managed) Secondary Insurance (if applicable):    Referring Physician: Leonidas Haskins MD     PCP: Rafiq Garibay MD Visits to Date: Total # of Visits to Date: 19   Progress note:Progress Note Counter: 23  Visits Approved: 30    No Show: 0  Cancelled Appts:2     Medical Diagnosis: Unspecified fracture of fifth metacarpal bone, right hand, initial encounter for open fracture [S62.306B]  Displaced fracture of base of fourth metacarpal bone, right hand, initial encounter for open fracture [S62.314B] R ring double z plasty contracture release and middle digit contracture release        Therapy Time     Time in 1259   Time out 1355   Total treatment minutes 56   Total time code minutes  56 Minutes        OT Manual therapy 12 minutes for 1 unit(s), CPT 90698  OT Therapeutic activities 44 minutes for 3 unit(s), CPT 62769       SUBJECTIVE EXAMINATION     Patient's date of birth confirmed: Yes     Pain Level:   Pt denies pain  Location: na  Description: na    Patient Comments:   \"I wear my splint all the time, even on vacation. I do all the exercises and use my hand every day at work. \"       Learning/Language barrier: no       HEP/Strategies/Orthosis Compliance: Patient verbally confirmed compliant with HEP's          Restrictions: none           OBJECTIVE EXAMINATION         TREATMENT     Focus of treatment was on the following:   addressing scar tissue and improving functional ROM R hand      MFR/Manual:   Performed cupping over dorsal MF scar and deep tissue releases over volar RF scar tissue due to moderate dryness and

## 2023-05-18 ENCOUNTER — HOSPITAL ENCOUNTER (OUTPATIENT)
Dept: OCCUPATIONAL THERAPY | Age: 48
Setting detail: THERAPIES SERIES
Discharge: HOME | End: 2023-05-18
Payer: COMMERCIAL

## 2023-05-18 NOTE — PROGRESS NOTES
Therapy                            Cancellation/No-show Note    Date: 2023  Patient Name: Sadie So Sr.    : 1975  (50 y.o.)     MRN: [id number]    Account #: [de-identified]    No Show: 0  Canceled Appointment: 3    Comments: For today's appointment patient:  [x]  Cancelled  []  Rescheduled appointment  []  No-show   []  Called pt to remind of next appointment     Reason given by patient:  []  Patient ill  []  Conflicting appointment  []  No transportation    []  Conflict with work  []  No reason given  [x]  Other:  child ill    [x] Pt has future appointments scheduled, no follow up needed  [] Pt requests to be on hold. Reason:   If > 2 weeks please discuss with therapist.  [] Therapist to call pt for follow up     Signature:  ANIL Padilla 2023 9:55 AM

## 2023-05-23 ENCOUNTER — APPOINTMENT (OUTPATIENT)
Dept: OCCUPATIONAL THERAPY | Age: 48
End: 2023-05-23
Payer: COMMERCIAL

## 2023-05-23 PROCEDURE — 97150 GROUP THERAPEUTIC PROCEDURES: CPT

## 2023-05-23 PROCEDURE — 97140 MANUAL THERAPY 1/> REGIONS: CPT

## 2023-05-23 PROCEDURE — 97110 THERAPEUTIC EXERCISES: CPT

## 2023-05-23 NOTE — PROGRESS NOTES
[facility] OCCUPATIONAL THERAPY     Occupational Therapy: Daily Note   Patient: Loida Ch Sr. (46 y.o. male)   Date:   Plan of Care Certification Period:  23   :  1975  MRN: [id number]  CSN: [id number]   Insurance: Payor: [address] [facility]  [address] / Plan: [address] [facility] [address] / Product Type: *No Product type* /   Insurance ID: [id number] - (Medicaid Managed) Secondary Insurance (if applicable):    Referring Physician: Wilfrid Sage MD     PCP: Darell Temple MD Visits to Date: Total # of Visits to Date: 20   Progress note:Progress Note Counter: 20  Visits Approved: 30    No Show: 0  Cancelled Appts:3     Medical Diagnosis: Unspecified fracture of fifth metacarpal bone, right hand, initial encounter for open fracture [S62.306B]  Displaced fracture of base of fourth metacarpal bone, right hand, initial encounter for open fracture [S62.314B] R ring double z plasty contracture release and middle digit contracture release        Therapy Time 34    Time in 0900   Time out 0934   Total treatment minutes 34   Total time code minutes           OT Group therapy for due to work conditioning pt present during therapy session for 18 minutes for 1 unit, CPT 46730  OT Manual therapy 16 minutes for 1 unit(s), CPT 78064       SUBJECTIVE EXAMINATION     Patient's date of birth confirmed: Yes     Pain Level:   Pt denies pain    Patient Comments:   Pt states has been doing well. \"Using a screw gun is hard. It's awkward because of the vibration. \"       Learning/Language barrier: no       HEP/Strategies/Orthosis Compliance: Pt reports compliance with HEP as instructed. Is wearing dorsal blocking splint. Restrictions: None. OBJECTIVE EXAMINATION              MP        PIP          DIP  2        50      -35/90         50   3        50      -60/82         40    R Hand  Strength:  36. 33#  R Hand Lateral Pinch:   16#  R Hand Palmar Pinch:   9.5#      TREATMENT     Focus

## 2023-05-25 ENCOUNTER — APPOINTMENT (OUTPATIENT)
Dept: OCCUPATIONAL THERAPY | Age: 48
End: 2023-05-25
Payer: COMMERCIAL

## 2023-05-30 ENCOUNTER — APPOINTMENT (OUTPATIENT)
Dept: OCCUPATIONAL THERAPY | Age: 48
End: 2023-05-30
Payer: COMMERCIAL

## 2023-06-01 ENCOUNTER — HOSPITAL ENCOUNTER (OUTPATIENT)
Dept: OCCUPATIONAL THERAPY | Age: 48
Setting detail: THERAPIES SERIES
Discharge: HOME | End: 2023-06-01
Payer: COMMERCIAL

## 2023-06-01 PROCEDURE — 97530 THERAPEUTIC ACTIVITIES: CPT

## 2023-06-01 NOTE — PROGRESS NOTES
extensions                                Grasp with B shoulder horizontal ab/add         Straight arm raises RUE w/ wrist neutral  Red theraputty x 10 reps each:  Palmar press w/ wrist and digit extension      intrinsic minus/plus grasp       Gross grasp    Fluidotherapy at 115° F for 20 minutes while completing AROM RUE grasp of foam ball with all wrist motions. Patient Education/HEP:   Continue recommended HEP/activities. , right UE strengthening, Pt completed 10-25 reps to demo understanding. Pt with good follow through. Provided green theraputty and green theraband for HEP. ASSESSMENT       Assessment: Pt making slow steady  progress towards goals. Pt. continues to utilize splint and using hand in all functional tasks. Adjusted plan for pt. to perform extensive HEP and to return to therapy 1 x every 2 weeks and will discuss discharge at that time. Encouraged pt. to utilize power massager and simple power vibration tools to improve functional use of R hand with power tools. Also recommended using hammer against softer surface to improve tolerance. Pt. demo good follow through with HEP. Pt. agrees with plan as pt. very busy at work. Post Treatment Pain: Pt denies pain    Patient's Activity Tolerance: good                 GOALS         Long Term Goals  Time Frame for Long Term Goals : 12 visits  Long Term Goal 1: Patient will report pain 1 or less during functional activities. Long Term Goal 2: Patient  will be indep with all recommended HEP's, adaptive strategies, and adaptive techniques. Long Term Goal 3: Patient  will be indep with all recommended HEP's, adaptive strategies, and adaptive techniques. Long Term Goal 4: Patient  will increase R  strength to 75% norm range for age and gender  Long Term Goal 5: Patient  will increase R pinch strength to 75% norm or greater for age and gender. Long Term Goal 6: Patient  will increase dexterity in R hand as observed by indep.  buttoning

## 2023-06-06 ENCOUNTER — APPOINTMENT (OUTPATIENT)
Dept: OCCUPATIONAL THERAPY | Age: 48
End: 2023-06-06
Payer: COMMERCIAL

## 2023-06-08 ENCOUNTER — APPOINTMENT (OUTPATIENT)
Dept: OCCUPATIONAL THERAPY | Age: 48
End: 2023-06-08
Payer: COMMERCIAL

## 2023-06-21 ENCOUNTER — PATIENT OUTREACH (OUTPATIENT)
Dept: PRIMARY CARE | Facility: CLINIC | Age: 48
End: 2023-06-21
Payer: COMMERCIAL

## 2023-06-21 NOTE — PROGRESS NOTES
Patient has met target of no readmission for 90 days post hospital discharge and is graduated from Transitional Care Management program at this time.

## 2023-06-29 ENCOUNTER — HOSPITAL ENCOUNTER (OUTPATIENT)
Dept: OCCUPATIONAL THERAPY | Age: 48
Setting detail: THERAPIES SERIES
Discharge: HOME | End: 2023-06-29
Payer: COMMERCIAL

## 2023-07-13 ENCOUNTER — OFFICE VISIT (OUTPATIENT)
Dept: PRIMARY CARE | Facility: CLINIC | Age: 48
End: 2023-07-13
Payer: COMMERCIAL

## 2023-07-13 VITALS
DIASTOLIC BLOOD PRESSURE: 92 MMHG | BODY MASS INDEX: 38.78 KG/M2 | HEIGHT: 71 IN | RESPIRATION RATE: 20 BRPM | TEMPERATURE: 98 F | HEART RATE: 88 BPM | WEIGHT: 277 LBS | SYSTOLIC BLOOD PRESSURE: 132 MMHG

## 2023-07-13 DIAGNOSIS — I10 ESSENTIAL HYPERTENSION: ICD-10-CM

## 2023-07-13 DIAGNOSIS — M79.662 PAIN OF LEFT CALF: Primary | ICD-10-CM

## 2023-07-13 DIAGNOSIS — I82.812 SUPERFICIAL THROMBOSIS OF LEG, LEFT: ICD-10-CM

## 2023-07-13 PROCEDURE — 3075F SYST BP GE 130 - 139MM HG: CPT | Performed by: FAMILY MEDICINE

## 2023-07-13 PROCEDURE — 99215 OFFICE O/P EST HI 40 MIN: CPT | Performed by: FAMILY MEDICINE

## 2023-07-13 PROCEDURE — 1036F TOBACCO NON-USER: CPT | Performed by: FAMILY MEDICINE

## 2023-07-13 PROCEDURE — 3080F DIAST BP >= 90 MM HG: CPT | Performed by: FAMILY MEDICINE

## 2023-07-13 RX ORDER — LISINOPRIL 10 MG/1
TABLET ORAL
COMMUNITY
Start: 2023-06-12 | End: 2023-07-13 | Stop reason: ALTCHOICE

## 2023-07-13 RX ORDER — RAMIPRIL 10 MG/1
10 CAPSULE ORAL DAILY
Qty: 30 CAPSULE | Refills: 6 | Status: SHIPPED | OUTPATIENT
Start: 2023-07-13 | End: 2023-09-22 | Stop reason: SDUPTHER

## 2023-07-13 ASSESSMENT — ENCOUNTER SYMPTOMS: LEG PAIN: 1

## 2023-07-13 NOTE — PROGRESS NOTES
Johnny Alegria is a 47 y.o. male here today for   Chief Complaint   Patient presents with    Leg Pain        Leg Pain   The incident occurred 3 to 5 days ago. There was no injury mechanism. The pain is present in the left leg. The quality of the pain is described as aching and cramping. The pain is at a severity of 5/10.      Patient is here complaining of new onset pain in his left calf.  Pain started in achilles area and now up into calf muscle just distal to the knee- since about 3 days ago.  He has not noticed any significant swelling or redness..  No injury or overuse.  He reports no previous history of blood clots.  He has not had any periods if immobility or other risk factors for blood clots.  He denies any chest pain or shortness of breath.  He reports that there is no weakness or loss of function and no pain with walking.      Current Outpatient Medications:     albuterol 90 mcg/actuation inhaler, Inhale 1-2 puffs every 6 hours if needed., Disp: , Rfl:     omeprazole (PriLOSEC) 40 mg DR capsule, Take 1 capsule (40 mg) by mouth once daily., Disp: 30 capsule, Rfl: 6    atorvastatin (Lipitor) 20 mg tablet, Take 1 tablet (20 mg) by mouth once daily. (Patient not taking: Reported on 7/13/2023), Disp: 30 tablet, Rfl: 6    ramipril (Altace) 10 mg capsule, Take 1 capsule (10 mg) by mouth once daily., Disp: 30 capsule, Rfl: 6    Patient Active Problem List   Diagnosis    Agatston coronary artery calcium score less than 100    Chest pain at rest    Essential hypertension    GERD (gastroesophageal reflux disease)    Hyperlipidemia    Noncompliance    Obstructive sleep apnea    Prediabetes    Shortness of breath    Vision blurred    Class 2 severe obesity with serious comorbidity and body mass index (BMI) of 38.0 to 38.9 in adult (CMS/[facility])    Pain of left calf    Superficial thrombosis of leg, left         No results found for this or any previous visit (from the past 2016 hour(s)).     Objective    Visit Vitals  BP  "(!) 132/92   Pulse 88   Temp 36.7 °C (98 °F)   Resp 20   Ht 1.803 m (5' 11\")   Wt 126 kg (277 lb)   BMI 38.63 kg/m²     Body mass index is 38.63 kg/m².     Physical Exam   General - Not in acute distress and cooperative.  Build & Nutrition - Well developed  Posture - Normal  Gait - Normal  Mental Status - alert and oriented x 3    Head - Normocephalic    Neck - Thyroid normal size    Eyes - Bilateral - Sclera clear and lids pink without edema or mass.      Skin - Warm and dry with no rashes on visible skin    Lungs - Clear to auscultation and normal breathing effort    Cardiovascular - RRR and no murmurs, rubs or thrill.    Left leg-patient's left calf is slightly larger than the right by about half an inch.  It does feel more firm and there is some slight tenderness of the proximal mid calf.  Homans' sign is negative.  There is no tenderness around the Achilles or heel.  I do not see any significant erythema or color change.    Neuropsychiatric - normal mood and affect        Assessment    1. Pain of left calf  Lower extremity venous duplex left   As below.   2. Essential hypertension  ramipril (Altace) 10 mg capsule   Condition is well controlled.  No change in current treatment.  Refills completed.  Appropriate labs ordered (or reviewed).     3. Superficial thrombosis of leg, left     The patient's presentation was concerning for a possible DVT so I sent him for a stat ultrasound.  I received the results at about noon that there was an acute superficial vein thrombosis in the left small saphenous vein.  It was very close to the popliteal junction which is a deep vein.  It was definitely less than 2 cm from the deep vein.  Research on up-to-date recommends that he be treated with full anticoagulation for 3 months because of very high risk of clot propagation into the deep venous system.  I will send in Xarelto 15 mg twice daily for 21 days and then he will take 20 mg daily to complete 3 months.  I called the " patient and talk to him personally about these results and discussed his options.  I warned of potential side effects of Xarelto including increased risk for bleeding.  He must avoid all ibuprofen and naproxen containing products.  He needs to avoid high risk situation where he could have trauma because of his increased risk of bleeding.  The patient fully understands.  We will make a follow-up appointment in the office in 7 to 10 days for recheck.  If he notices any worsening of his symptoms or any shortness of breath or chest pain he should immediately go to the emergency room.

## 2023-07-25 ENCOUNTER — HOSPITAL ENCOUNTER (OUTPATIENT)
Dept: OCCUPATIONAL THERAPY | Age: 48
Setting detail: THERAPIES SERIES
Discharge: HOME | End: 2023-07-25
Payer: COMMERCIAL

## 2023-07-25 PROCEDURE — 97760 ORTHOTIC MGMT&TRAING 1ST ENC: CPT

## 2023-07-25 PROCEDURE — 97530 THERAPEUTIC ACTIVITIES: CPT

## 2023-07-25 NOTE — PROGRESS NOTES
[facility] OCCUPATIONAL THERAPY     Occupational Therapy: Daily Note   Patient: Lamont Parsons Sr. (46 y.o. male)   Date:   Plan of Care Certification Period:  23   :  1975  MRN: [id number]  CSN: [id number]   Insurance: Payor: [address] [place] [address] / Plan: [address] [place] [address] / Product Type: *No Product type* /   Insurance ID: [id number] - (Medicaid Managed) Secondary Insurance (if applicable):    Referring Physician: Bertha Casey MD     PCP: Eulalio Jama MD Visits to Date: Total # of Visits to Date: 23   Progress note:Progress Note Counter: 23  Visits Approved: 30    No Show: 2  Cancelled Appts:4     Medical Diagnosis: Unspecified fracture of fifth metacarpal bone, right hand, initial encounter for open fracture [S62.306B]  Displaced fracture of base of fourth metacarpal bone, right hand, initial encounter for open fracture [S62.314B] R ring double z plasty contracture release and middle digit contracture release        Therapy Time     Time in 0802   Time out 0859   Total treatment minutes 57   Total time code minutes  57 Minutes        OT Splint/orthosis fit and training 12 minutes for 1 unit(s), CPT 57093   OT Therapeutic activities 45 minutes for 3 unit(s), CPT 18456       SUBJECTIVE EXAMINATION     Patient's date of birth confirmed: Yes     Pain Level:   Pt denies pain  Location:   Description: stiffness in R dorsal hand    Patient Comments:   \"I use my splint so much that the velcro keeps coming off. But I need it refitted because I left it in my car and it melted. \"     Learning/Language barrier: no     HEP/Strategies/Orthosis Compliance: Patient verbally confirmed compliant with HEP's        Restrictions: none         OBJECTIVE EXAMINATION        Left   Normal    MP PIP DIP       0-90 0-100 0-70       A P A P A P   Index                 Extension   WFL NT WFL NT WFL NT   Flexion   63 NT WFL NT WFL NT   Middle                 Extension   WFL NT -21 NT

## 2023-07-27 ENCOUNTER — OFFICE VISIT (OUTPATIENT)
Dept: PRIMARY CARE | Facility: CLINIC | Age: 48
End: 2023-07-27
Payer: COMMERCIAL

## 2023-07-27 VITALS
DIASTOLIC BLOOD PRESSURE: 84 MMHG | RESPIRATION RATE: 16 BRPM | HEIGHT: 71 IN | WEIGHT: 276 LBS | TEMPERATURE: 97.2 F | BODY MASS INDEX: 38.64 KG/M2 | HEART RATE: 72 BPM | SYSTOLIC BLOOD PRESSURE: 122 MMHG

## 2023-07-27 DIAGNOSIS — M79.662 PAIN OF LEFT CALF: ICD-10-CM

## 2023-07-27 DIAGNOSIS — I82.812 SUPERFICIAL THROMBOSIS OF LEG, LEFT: ICD-10-CM

## 2023-07-27 PROCEDURE — 3074F SYST BP LT 130 MM HG: CPT | Performed by: FAMILY MEDICINE

## 2023-07-27 PROCEDURE — 3079F DIAST BP 80-89 MM HG: CPT | Performed by: FAMILY MEDICINE

## 2023-07-27 PROCEDURE — 99214 OFFICE O/P EST MOD 30 MIN: CPT | Performed by: FAMILY MEDICINE

## 2023-07-27 PROCEDURE — 1036F TOBACCO NON-USER: CPT | Performed by: FAMILY MEDICINE

## 2023-07-27 NOTE — PROGRESS NOTES
Johnny Alegria is a 47 y.o. male here today for recheck thrombosis in left leg.      HPI     See previous office note from 2 weeks ago.  Patient has been on Xarelto 15 mg twice daily for 2 weeks now.  He says about 3 days after starting the medication he noticed improvement in the pain in his left calf and Achilles area.  He reports no problems or side effects with the medication.  He has not had any chest pain or shortness of breath.  The previous swelling in the calf has also improved.  He denies any rash or excessive bleeding.      Current Outpatient Medications:     albuterol 90 mcg/actuation inhaler, Inhale 1-2 puffs every 6 hours if needed., Disp: , Rfl:     omeprazole (PriLOSEC) 40 mg DR capsule, Take 1 capsule (40 mg) by mouth once daily., Disp: 30 capsule, Rfl: 6    ramipril (Altace) 10 mg capsule, Take 1 capsule (10 mg) by mouth once daily., Disp: 30 capsule, Rfl: 6    rivaroxaban (Xarelto) 15 mg tablet, Take 1 tablet (15 mg) by mouth 2 times a day for 21 days. Take with food., Disp: 42 tablet, Rfl: 0    rivaroxaban (Xarelto) 20 mg tablet, Take 1 tablet (20 mg) by mouth once daily. Take with food., Disp: 30 tablet, Rfl: 1    atorvastatin (Lipitor) 20 mg tablet, Take 1 tablet (20 mg) by mouth once daily. (Patient not taking: Reported on 7/13/2023), Disp: 30 tablet, Rfl: 6    Patient Active Problem List   Diagnosis    Agatston coronary artery calcium score less than 100    Chest pain at rest    Essential hypertension    GERD (gastroesophageal reflux disease)    Hyperlipidemia    Noncompliance    Obstructive sleep apnea    Prediabetes    Shortness of breath    Vision blurred    Class 2 severe obesity with serious comorbidity and body mass index (BMI) of 38.0 to 38.9 in adult (CMS/[facility])    Pain of left calf    Superficial thrombosis of leg, left         No results found for this or any previous visit (from the past 672 hour(s)).     Objective    Visit Vitals  /84   Pulse 72   Temp 36.2 °C (97.2 °F)    "Resp 16   Ht 1.803 m (5' 11\")   Wt 125 kg (276 lb)   BMI 38.49 kg/m²     Body mass index is 38.49 kg/m².     Physical Exam   General - Not in acute distress and cooperative.  Build & Nutrition - Well developed  Posture - Normal  Gait - Normal  Mental Status - alert and oriented x 3    Head - Normocephalic    Neck - Thyroid normal size    Eyes - Bilateral - Sclera clear and lids pink without edema or mass.      Skin - Warm and dry with no rashes on visible skin    Lungs - Clear to auscultation and normal breathing effort    Cardiovascular - RRR and no murmurs, rubs or thrill.    Neuropsychiatric - normal mood and affect    Legs-patient's left leg is definitely less swollen below the knee.  On measurement today the left leg is about 1/4 inch larger in diameter than the right leg.  He still has some very mild tenderness deep in the left calf.  There is no redness or warmth.    Assessment    1. Superficial thrombosis of leg, left     Even though this was a thrombus in the superficial vein it was extremely close to the deep veins and we are treating it like a DVT.  He will continue Xarelto and switch over to 20 mg once daily in about 1 more week.  We will plan on following up in about 2 months prior to discontinuing the Xarelto.  He will be on it for a minimum of 3 months.  On review he has no previous history of blood clots and there is no family history of blood clots or pulmonary embolism.  This was unprovoked because there was no injury or an activity or compression which seem to cause the blood clot.     2. Pain of left calf              "

## 2023-08-28 ENCOUNTER — TELEPHONE (OUTPATIENT)
Dept: PRIMARY CARE | Facility: CLINIC | Age: 48
End: 2023-08-28
Payer: COMMERCIAL

## 2023-08-28 NOTE — TELEPHONE ENCOUNTER
Pt is having lower back pain, he is on blood thinners and wants to know what he can take for pain.

## 2023-09-19 ENCOUNTER — APPOINTMENT (OUTPATIENT)
Dept: PRIMARY CARE | Facility: CLINIC | Age: 48
End: 2023-09-19
Payer: COMMERCIAL

## 2023-09-21 PROBLEM — E78.2 MIXED HYPERLIPIDEMIA: Status: ACTIVE | Noted: 2023-03-16

## 2023-09-21 PROBLEM — K21.00 GASTROESOPHAGEAL REFLUX DISEASE WITH ESOPHAGITIS WITHOUT HEMORRHAGE: Status: ACTIVE | Noted: 2023-03-16

## 2023-09-22 ENCOUNTER — OFFICE VISIT (OUTPATIENT)
Dept: PRIMARY CARE | Facility: CLINIC | Age: 48
End: 2023-09-22
Payer: COMMERCIAL

## 2023-09-22 VITALS
WEIGHT: 283 LBS | BODY MASS INDEX: 39.62 KG/M2 | RESPIRATION RATE: 18 BRPM | HEART RATE: 84 BPM | TEMPERATURE: 97.1 F | DIASTOLIC BLOOD PRESSURE: 84 MMHG | HEIGHT: 71 IN | SYSTOLIC BLOOD PRESSURE: 134 MMHG

## 2023-09-22 DIAGNOSIS — I10 PRIMARY HYPERTENSION: ICD-10-CM

## 2023-09-22 DIAGNOSIS — E78.2 MIXED HYPERLIPIDEMIA: ICD-10-CM

## 2023-09-22 DIAGNOSIS — K21.9 GASTROESOPHAGEAL REFLUX DISEASE WITHOUT ESOPHAGITIS: ICD-10-CM

## 2023-09-22 DIAGNOSIS — M79.662 PAIN OF LEFT CALF: ICD-10-CM

## 2023-09-22 DIAGNOSIS — I82.812 SUPERFICIAL THROMBOSIS OF LEG, LEFT: ICD-10-CM

## 2023-09-22 DIAGNOSIS — S69.91XS HAND TRAUMA, RIGHT, SEQUELA: Primary | ICD-10-CM

## 2023-09-22 DIAGNOSIS — E78.5 HYPERLIPIDEMIA, UNSPECIFIED HYPERLIPIDEMIA TYPE: ICD-10-CM

## 2023-09-22 DIAGNOSIS — I10 ESSENTIAL HYPERTENSION: ICD-10-CM

## 2023-09-22 PROCEDURE — 3079F DIAST BP 80-89 MM HG: CPT | Performed by: FAMILY MEDICINE

## 2023-09-22 PROCEDURE — 99215 OFFICE O/P EST HI 40 MIN: CPT | Performed by: FAMILY MEDICINE

## 2023-09-22 PROCEDURE — 1036F TOBACCO NON-USER: CPT | Performed by: FAMILY MEDICINE

## 2023-09-22 PROCEDURE — 3075F SYST BP GE 130 - 139MM HG: CPT | Performed by: FAMILY MEDICINE

## 2023-09-22 RX ORDER — ATORVASTATIN CALCIUM 20 MG/1
20 TABLET, FILM COATED ORAL DAILY
Qty: 30 TABLET | Refills: 6 | Status: SHIPPED | OUTPATIENT
Start: 2023-09-22 | End: 2024-03-14 | Stop reason: SDUPTHER

## 2023-09-22 RX ORDER — OMEPRAZOLE 40 MG/1
40 CAPSULE, DELAYED RELEASE ORAL DAILY
Qty: 30 CAPSULE | Refills: 6 | Status: SHIPPED | OUTPATIENT
Start: 2023-09-22 | End: 2024-03-14 | Stop reason: ALTCHOICE

## 2023-09-22 RX ORDER — RAMIPRIL 10 MG/1
10 CAPSULE ORAL DAILY
Qty: 30 CAPSULE | Refills: 6 | Status: SHIPPED | OUTPATIENT
Start: 2023-09-22 | End: 2024-03-14 | Stop reason: SDUPTHER

## 2023-09-22 NOTE — PROGRESS NOTES
Johnny Alegria is a 47 y.o. male here today for   Chief Complaint   Patient presents with    Hyperlipidemia    Hypertension    GERD           Right hand injury one year ago and had major reconstruction by Dr. Sun.  8/1/2022.  Now with contractures of fingers and decreased ROM.  His surgeon left the area.  He would like a referral to a new hand surgeon.    DVT of left leg-the patient says he stopped the Xarelto about 1 week ago because he thought he was only supposed to be on it for 2 months even though I had told him he should be on it for 3 months.  He has had no recurrence of swelling or pain in the left calf.  This was actually a superficial vein thrombosis but it was very close to a deep vein and the recommendation was to be on the medicine for 3 months.    HTN recheck -- Patient denies chest pain, SOB, edema, palpitations on review.  Taking medication correctly and denies any side effects.    GERD recheck -- Patient reports GI symptoms are well controlled with current treatment.  No heartburn, chest pain, vomiting, diarrhea.  No SE's from current treatment.  Patient wishes to continue the same treatment.    Hyperlipidemia-the patient is supposed to be on atorvastatin daily but he says he just stopped it a few months ago because he did not think it was needed and he did not remember what it was for.      Current Outpatient Medications:     albuterol 90 mcg/actuation inhaler, Inhale 1-2 puffs every 6 hours if needed., Disp: , Rfl:     atorvastatin (Lipitor) 20 mg tablet, Take 1 tablet (20 mg) by mouth once daily., Disp: 30 tablet, Rfl: 6    omeprazole (PriLOSEC) 40 mg DR capsule, Take 1 capsule (40 mg) by mouth once daily., Disp: 30 capsule, Rfl: 6    ramipril (Altace) 10 mg capsule, Take 1 capsule (10 mg) by mouth once daily., Disp: 30 capsule, Rfl: 6    rivaroxaban (Xarelto) 20 mg tablet, Take 1 tablet (20 mg) by mouth once daily. Take with food., Disp: 30 tablet, Rfl: 0    Patient Active Problem List  "  Diagnosis    Agatston coronary artery calcium score less than 100    Chest pain at rest    Primary hypertension    Gastroesophageal reflux disease with esophagitis without hemorrhage    Mixed hyperlipidemia    Noncompliance    Obstructive sleep apnea    Prediabetes    Shortness of breath    Vision blurred    Class 2 severe obesity with serious comorbidity and body mass index (BMI) of 38.0 to 38.9 in adult (CMS/HCC)    Pain of left calf    Superficial thrombosis of leg, left    Hand trauma, right, sequela         No results found for this or any previous visit (from the past 672 hour(s)).     Objective    Visit Vitals  /84   Pulse 84   Temp 36.2 °C (97.1 °F)   Resp 18   Ht 1.803 m (5' 11\")   Wt 128 kg (283 lb)   BMI 39.47 kg/m²     Body mass index is 39.47 kg/m².     Physical Exam   General - Not in acute distress and cooperative.  Build & Nutrition - Well developed  Posture - Normal  Gait - Normal  Mental Status - alert and oriented x 3    Head - Normocephalic    Neck - Thyroid normal size    Eyes - Bilateral - Sclera clear and lids pink without edema or mass.      Skin - Warm and dry with no rashes on visible skin    Lungs - Clear to auscultation and normal breathing effort    Cardiovascular - RRR and no murmurs, rubs or thrill.    Peripheral Vascular - Bilateral - no edema present    Neuropsychiatric - normal mood and affect       Assessment    1. Hand trauma, right, sequela  Referral to Orthopaedic Surgery   Patient has a history of reconstruction of the right hand after a major trauma.  He is having some problems so I will refer him to Dr. Mcwilliams for further evaluation and possible corrective surgery.     2. Pain of left calf  rivaroxaban (Xarelto) 20 mg tablet      3. Superficial thrombosis of leg, left  rivaroxaban (Xarelto) 20 mg tablet   I told the patient he was supposed to be on the Xarelto for 3 months total and he was only on it for 2 months.  He will restart it at 20 mg daily and continue " for 1 more month and then he can discontinue it.     4. Primary hypertension  Comprehensive Metabolic Panel, CBC, Lipid Panel, ramipril (Altace) 10 mg capsule   Condition well controlled.  No change in current treatment regimen.  Refill given of current medication.  Appropriate labs ordered or reviewed.  Make a follow up appointment with me for recheck in 6 months.     5. Mixed hyperlipidemia  Comprehensive Metabolic Panel, CBC, Lipid Panel, atorvastatin (Lipitor) 20 mg tablet   The patient self discontinued atorvastatin so we will not get labs right now but I want him to get labs in 2 months.  We will restart atorvastatin 20 mg daily.  We discussed the importance of lowering his cholesterol and how this will lower his risk for heart attack and stroke in the future.   6. Essential hypertension        7. Gastroesophageal reflux disease without esophagitis  omeprazole (PriLOSEC) 40 mg DR capsule   Condition well controlled.  No change in current treatment regimen.  Refill given of current medication.  Make a follow up appointment with me for recheck in 6 months.

## 2023-11-10 ENCOUNTER — HOSPITAL ENCOUNTER (EMERGENCY)
Facility: HOSPITAL | Age: 48
Discharge: HOME | End: 2023-11-10
Attending: EMERGENCY MEDICINE
Payer: COMMERCIAL

## 2023-11-10 ENCOUNTER — APPOINTMENT (OUTPATIENT)
Dept: RADIOLOGY | Facility: HOSPITAL | Age: 48
End: 2023-11-10
Payer: COMMERCIAL

## 2023-11-10 VITALS
HEIGHT: 71 IN | SYSTOLIC BLOOD PRESSURE: 132 MMHG | WEIGHT: 275 LBS | OXYGEN SATURATION: 95 % | RESPIRATION RATE: 18 BRPM | TEMPERATURE: 98.1 F | HEART RATE: 88 BPM | BODY MASS INDEX: 38.5 KG/M2 | DIASTOLIC BLOOD PRESSURE: 100 MMHG

## 2023-11-10 DIAGNOSIS — N20.0 NEPHROLITHIASIS: Primary | ICD-10-CM

## 2023-11-10 LAB
ALBUMIN SERPL BCP-MCNC: 4.7 G/DL (ref 3.4–5)
ALP SERPL-CCNC: 89 U/L (ref 33–120)
ALT SERPL W P-5'-P-CCNC: 50 U/L (ref 10–52)
ANION GAP SERPL CALC-SCNC: 13 MMOL/L (ref 10–20)
APPEARANCE UR: CLEAR
AST SERPL W P-5'-P-CCNC: 29 U/L (ref 9–39)
BACTERIA #/AREA URNS AUTO: ABNORMAL /HPF
BASOPHILS # BLD AUTO: 0.02 X10*3/UL (ref 0–0.1)
BASOPHILS NFR BLD AUTO: 0.1 %
BILIRUB SERPL-MCNC: 0.6 MG/DL (ref 0–1.2)
BILIRUB UR STRIP.AUTO-MCNC: NEGATIVE MG/DL
BUN SERPL-MCNC: 16 MG/DL (ref 6–23)
CALCIUM SERPL-MCNC: 9.8 MG/DL (ref 8.6–10.3)
CHLORIDE SERPL-SCNC: 104 MMOL/L (ref 98–107)
CO2 SERPL-SCNC: 27 MMOL/L (ref 21–32)
COLOR UR: ABNORMAL
CREAT SERPL-MCNC: 1.45 MG/DL (ref 0.5–1.3)
EOSINOPHIL # BLD AUTO: 0.02 X10*3/UL (ref 0–0.7)
EOSINOPHIL NFR BLD AUTO: 0.1 %
ERYTHROCYTE [DISTWIDTH] IN BLOOD BY AUTOMATED COUNT: 11.9 % (ref 11.5–14.5)
GFR SERPL CREATININE-BSD FRML MDRD: 59 ML/MIN/1.73M*2
GLUCOSE SERPL-MCNC: 146 MG/DL (ref 74–99)
GLUCOSE UR STRIP.AUTO-MCNC: NEGATIVE MG/DL
HCT VFR BLD AUTO: 47.3 % (ref 41–52)
HGB BLD-MCNC: 15.7 G/DL (ref 13.5–17.5)
HOLD SPECIMEN: NORMAL
HYALINE CASTS #/AREA URNS AUTO: ABNORMAL /LPF
IMM GRANULOCYTES # BLD AUTO: 0.14 X10*3/UL (ref 0–0.7)
IMM GRANULOCYTES NFR BLD AUTO: 1 % (ref 0–0.9)
KETONES UR STRIP.AUTO-MCNC: NEGATIVE MG/DL
LEUKOCYTE ESTERASE UR QL STRIP.AUTO: ABNORMAL
LYMPHOCYTES # BLD AUTO: 1.18 X10*3/UL (ref 1.2–4.8)
LYMPHOCYTES NFR BLD AUTO: 8.7 %
MCH RBC QN AUTO: 30.3 PG (ref 26–34)
MCHC RBC AUTO-ENTMCNC: 33.2 G/DL (ref 32–36)
MCV RBC AUTO: 91 FL (ref 80–100)
MONOCYTES # BLD AUTO: 0.8 X10*3/UL (ref 0.1–1)
MONOCYTES NFR BLD AUTO: 5.9 %
NEUTROPHILS # BLD AUTO: 11.39 X10*3/UL (ref 1.2–7.7)
NEUTROPHILS NFR BLD AUTO: 84.2 %
NITRITE UR QL STRIP.AUTO: NEGATIVE
NRBC BLD-RTO: 0 /100 WBCS (ref 0–0)
PH UR STRIP.AUTO: 5 [PH]
PLATELET # BLD AUTO: 295 X10*3/UL (ref 150–450)
POTASSIUM SERPL-SCNC: 4.1 MMOL/L (ref 3.5–5.3)
PROT SERPL-MCNC: 8.5 G/DL (ref 6.4–8.2)
PROT UR STRIP.AUTO-MCNC: NEGATIVE MG/DL
RBC # BLD AUTO: 5.19 X10*6/UL (ref 4.5–5.9)
RBC # UR STRIP.AUTO: ABNORMAL /UL
RBC #/AREA URNS AUTO: ABNORMAL /HPF
SODIUM SERPL-SCNC: 140 MMOL/L (ref 136–145)
SP GR UR STRIP.AUTO: 1.04
SQUAMOUS #/AREA URNS AUTO: ABNORMAL /HPF
UROBILINOGEN UR STRIP.AUTO-MCNC: <2 MG/DL
WBC # BLD AUTO: 13.6 X10*3/UL (ref 4.4–11.3)
WBC #/AREA URNS AUTO: ABNORMAL /HPF

## 2023-11-10 PROCEDURE — 87086 URINE CULTURE/COLONY COUNT: CPT | Mod: STJLAB | Performed by: EMERGENCY MEDICINE

## 2023-11-10 PROCEDURE — 2500000004 HC RX 250 GENERAL PHARMACY W/ HCPCS (ALT 636 FOR OP/ED)

## 2023-11-10 PROCEDURE — 99285 EMERGENCY DEPT VISIT HI MDM: CPT | Performed by: EMERGENCY MEDICINE

## 2023-11-10 PROCEDURE — 96361 HYDRATE IV INFUSION ADD-ON: CPT

## 2023-11-10 PROCEDURE — 96374 THER/PROPH/DIAG INJ IV PUSH: CPT | Mod: 59

## 2023-11-10 PROCEDURE — 2500000004 HC RX 250 GENERAL PHARMACY W/ HCPCS (ALT 636 FOR OP/ED): Performed by: EMERGENCY MEDICINE

## 2023-11-10 PROCEDURE — 99285 EMERGENCY DEPT VISIT HI MDM: CPT | Mod: 25 | Performed by: EMERGENCY MEDICINE

## 2023-11-10 PROCEDURE — 74177 CT ABD & PELVIS W/CONTRAST: CPT

## 2023-11-10 PROCEDURE — 80053 COMPREHEN METABOLIC PANEL: CPT | Performed by: EMERGENCY MEDICINE

## 2023-11-10 PROCEDURE — 2550000001 HC RX 255 CONTRASTS: Performed by: EMERGENCY MEDICINE

## 2023-11-10 PROCEDURE — 74177 CT ABD & PELVIS W/CONTRAST: CPT | Performed by: STUDENT IN AN ORGANIZED HEALTH CARE EDUCATION/TRAINING PROGRAM

## 2023-11-10 PROCEDURE — 96376 TX/PRO/DX INJ SAME DRUG ADON: CPT

## 2023-11-10 PROCEDURE — 96375 TX/PRO/DX INJ NEW DRUG ADDON: CPT

## 2023-11-10 PROCEDURE — 81001 URINALYSIS AUTO W/SCOPE: CPT | Performed by: EMERGENCY MEDICINE

## 2023-11-10 PROCEDURE — 94760 N-INVAS EAR/PLS OXIMETRY 1: CPT

## 2023-11-10 PROCEDURE — 36415 COLL VENOUS BLD VENIPUNCTURE: CPT | Performed by: EMERGENCY MEDICINE

## 2023-11-10 PROCEDURE — 99284 EMERGENCY DEPT VISIT MOD MDM: CPT | Mod: 25

## 2023-11-10 PROCEDURE — 85025 COMPLETE CBC W/AUTO DIFF WBC: CPT | Performed by: EMERGENCY MEDICINE

## 2023-11-10 RX ORDER — ONDANSETRON 4 MG/1
4 TABLET, ORALLY DISINTEGRATING ORAL EVERY 8 HOURS PRN
Qty: 20 TABLET | Refills: 0 | Status: SHIPPED | OUTPATIENT
Start: 2023-11-10 | End: 2023-11-17

## 2023-11-10 RX ORDER — NAPROXEN 500 MG/1
500 TABLET ORAL
Qty: 30 TABLET | Refills: 0 | Status: SHIPPED | OUTPATIENT
Start: 2023-11-10 | End: 2023-11-25

## 2023-11-10 RX ORDER — TAMSULOSIN HYDROCHLORIDE 0.4 MG/1
0.4 CAPSULE ORAL DAILY
Qty: 7 CAPSULE | Refills: 0 | Status: SHIPPED | OUTPATIENT
Start: 2023-11-10 | End: 2023-11-17

## 2023-11-10 RX ORDER — OXYCODONE HYDROCHLORIDE 5 MG/1
5 TABLET ORAL EVERY 6 HOURS PRN
Qty: 8 TABLET | Refills: 0 | Status: SHIPPED | OUTPATIENT
Start: 2023-11-10 | End: 2023-12-06 | Stop reason: ALTCHOICE

## 2023-11-10 RX ORDER — MORPHINE SULFATE 4 MG/ML
4 INJECTION, SOLUTION INTRAMUSCULAR; INTRAVENOUS ONCE
Status: COMPLETED | OUTPATIENT
Start: 2023-11-10 | End: 2023-11-10

## 2023-11-10 RX ORDER — ONDANSETRON HYDROCHLORIDE 2 MG/ML
4 INJECTION, SOLUTION INTRAVENOUS ONCE
Status: COMPLETED | OUTPATIENT
Start: 2023-11-10 | End: 2023-11-10

## 2023-11-10 RX ORDER — MORPHINE SULFATE 2 MG/ML
2 INJECTION, SOLUTION INTRAMUSCULAR; INTRAVENOUS ONCE
Status: COMPLETED | OUTPATIENT
Start: 2023-11-10 | End: 2023-11-10

## 2023-11-10 RX ADMIN — IOHEXOL 90 ML: 350 INJECTION, SOLUTION INTRAVENOUS at 13:32

## 2023-11-10 RX ADMIN — MORPHINE SULFATE 4 MG: 4 INJECTION, SOLUTION INTRAMUSCULAR; INTRAVENOUS at 13:36

## 2023-11-10 RX ADMIN — SODIUM CHLORIDE 1000 ML: 9 INJECTION, SOLUTION INTRAVENOUS at 12:12

## 2023-11-10 RX ADMIN — ONDANSETRON 4 MG: 2 INJECTION INTRAMUSCULAR; INTRAVENOUS at 12:08

## 2023-11-10 RX ADMIN — MORPHINE SULFATE 2 MG: 2 INJECTION, SOLUTION INTRAMUSCULAR; INTRAVENOUS at 12:06

## 2023-11-10 ASSESSMENT — PAIN DESCRIPTION - LOCATION
LOCATION: ABDOMEN
LOCATION: GROIN
LOCATION: ABDOMEN

## 2023-11-10 ASSESSMENT — PAIN SCALES - GENERAL
PAINLEVEL_OUTOF10: 7
PAINLEVEL_OUTOF10: 8
PAINLEVEL_OUTOF10: 6
PAINLEVEL_OUTOF10: 6

## 2023-11-10 ASSESSMENT — LIFESTYLE VARIABLES
EVER HAD A DRINK FIRST THING IN THE MORNING TO STEADY YOUR NERVES TO GET RID OF A HANGOVER: NO
HAVE PEOPLE ANNOYED YOU BY CRITICIZING YOUR DRINKING: NO
REASON UNABLE TO ASSESS: NO
HAVE YOU EVER FELT YOU SHOULD CUT DOWN ON YOUR DRINKING: NO
EVER FELT BAD OR GUILTY ABOUT YOUR DRINKING: NO

## 2023-11-10 ASSESSMENT — COLUMBIA-SUICIDE SEVERITY RATING SCALE - C-SSRS
6. HAVE YOU EVER DONE ANYTHING, STARTED TO DO ANYTHING, OR PREPARED TO DO ANYTHING TO END YOUR LIFE?: NO
1. IN THE PAST MONTH, HAVE YOU WISHED YOU WERE DEAD OR WISHED YOU COULD GO TO SLEEP AND NOT WAKE UP?: NO
2. HAVE YOU ACTUALLY HAD ANY THOUGHTS OF KILLING YOURSELF?: NO

## 2023-11-10 ASSESSMENT — PAIN DESCRIPTION - ORIENTATION
ORIENTATION: RIGHT
ORIENTATION: RIGHT

## 2023-11-10 ASSESSMENT — PAIN - FUNCTIONAL ASSESSMENT: PAIN_FUNCTIONAL_ASSESSMENT: 0-10

## 2023-11-10 ASSESSMENT — PAIN DESCRIPTION - PAIN TYPE: TYPE: ACUTE PAIN

## 2023-11-10 NOTE — ED PROVIDER NOTES
HPI   Chief Complaint   Patient presents with    Flank Pain       Patient is a 48-year-old male with history of hypertension, hyperlipidemia, GERD presenting with RLQ abdominal pain that began this morning about 3 hours prior to arrival.  Patient states that he had 3 episodes of nonbloody diarrhea and then developed 8/10, sharp, constant, RLQ abdominal pain without radiation.  He also reports associated subjective fever and 3 episodes of NB/NB vomiting.  He has never felt this pain before.  He has no history of abdominal surgeries.    PMH: GERD, hypertension, hyperlipidemia, DVT, RONALD  Social history: Never smoker, occasional alcohol use, no illicit drug use                        No data recorded                Patient History   Past Medical History:   Diagnosis Date    Daytime somnolence 03/16/2023    Other chest pain 10/20/2015    Atypical chest pain    Other sleep disorders 10/20/2015    Non-restorative sleep    Poison ivy dermatitis 03/16/2023    Snoring 10/20/2015    Snoring     History reviewed. No pertinent surgical history.  Family History   Problem Relation Name Age of Onset    Other (heart artery stent) Mother      Other (cva) Father      Hypertension Father      Other (acute myocardial infraction) Father       Social History     Tobacco Use    Smoking status: Never    Smokeless tobacco: Never   Substance Use Topics    Alcohol use: Not Currently    Drug use: Never       Physical Exam   ED Triage Vitals [11/10/23 1123]   Temp Heart Rate Resp BP   36.4 °C (97.5 °F) 88 18 (!) 192/117      SpO2 Temp Source Heart Rate Source Patient Position   99 % Temporal Monitor Sitting      BP Location FiO2 (%)     Right arm --       Physical Exam  Vitals and nursing note reviewed.   Constitutional:       General: He is not in acute distress.     Appearance: Normal appearance. He is obese. He is not ill-appearing or toxic-appearing.      Comments: Uncomfortable   HENT:      Head: Normocephalic and atraumatic.      Right  Ear: External ear normal.      Left Ear: External ear normal.      Nose: Nose normal.      Mouth/Throat:      Mouth: Mucous membranes are moist.      Pharynx: No oropharyngeal exudate or posterior oropharyngeal erythema.   Eyes:      General: No scleral icterus.     Extraocular Movements: Extraocular movements intact.      Pupils: Pupils are equal, round, and reactive to light.   Cardiovascular:      Rate and Rhythm: Normal rate and regular rhythm.      Pulses: Normal pulses.      Heart sounds: Normal heart sounds. No murmur heard.     No friction rub. No gallop.   Pulmonary:      Effort: Pulmonary effort is normal. No respiratory distress.      Breath sounds: Normal breath sounds. No stridor. No wheezing, rhonchi or rales.   Abdominal:      General: Abdomen is flat. Bowel sounds are normal. There is no distension.      Palpations: Abdomen is soft. There is no mass.      Tenderness: There is no abdominal tenderness. There is no right CVA tenderness, left CVA tenderness, guarding or rebound.      Hernia: No hernia is present.   Musculoskeletal:         General: No swelling, deformity or signs of injury. Normal range of motion.      Cervical back: Normal range of motion and neck supple. No rigidity.      Right lower leg: No edema.      Left lower leg: No edema.   Lymphadenopathy:      Cervical: No cervical adenopathy.   Skin:     General: Skin is warm and dry.      Capillary Refill: Capillary refill takes less than 2 seconds.      Findings: No rash.   Neurological:      General: No focal deficit present.      Mental Status: He is alert and oriented to person, place, and time. Mental status is at baseline.      Cranial Nerves: No cranial nerve deficit.      Sensory: No sensory deficit.      Motor: No weakness.      Coordination: Coordination normal.   Psychiatric:         Mood and Affect: Mood normal.         Behavior: Behavior normal.         ED Course & MDM   ED Course as of 11/10/23 1758   Fri Nov 10, 2023   1648  CBC with Differential(!)  Mild leukocytosis to 13.6   [JR]   1250 Comprehensive Metabolic Panel(!)  Cr at 1.45 increased from baseline 1.15 1 year ago [JR]   1442 CT abdomen pelvis w IV contrast  4 mm obstructive stone [JR]   1452 4 mm right UVJ obstructing calculus with mild hydroureteronephrosis,  perinephric soft tissue stranding and delayed nephrogram   [JR]   1559 Microscopic Only, Urine(!)  Urine sample appears to be contaminated.  [JR]      ED Course User Index  [JR] Sixto Chandler DO         Diagnoses as of 11/10/23 1758   Nephrolithiasis       Medical Decision Making  Patient is a 48-year-old male presenting with right lower quadrant pelvic pain that started today.  In the ED, patient was initially hypertensive with otherwise normal vital signs.  On physical exam, he is not peritonitic.  Negative CVA tenderness.  Evaluation included CBC, CMP, UA, CT abdomen pelvis.  CBC shows mild leukocytosis to 13.6, no anemia.  CT does show a 4 mm obstructive stone with mild hydro-.  UA shows 1+ leukocytes, 1+ blood, microscopy does show many squamous cells, 1+ bacteria.  CT showed no thickening of the bladder suggestive of UTI.  Discussed with attending physician, do not feel that antibiotics are warranted.  Patient will be discharged home with pain medication, Zofran, Flomax and urology follow-up.  Return precautions discussed including intractable pain and inability to tolerate p.o.  Blood pressure did improve to 132/100 on recheck.      Discussed with attending physician Dr. Montiel.      DO Sixto Paz DO  Resident  11/10/23 5111

## 2023-11-10 NOTE — DISCHARGE INSTRUCTIONS
Johnny,  Your CT scan showed evidence of a 4 mm kidney stone.  Please follow-up with urology.    If you are unable to tolerate oral intake, develop a fever, or have unbearable pain please return to the emergency department.    You were sent prescriptions for pain control, nausea, and to help you pass the stone.  Take these as prescribed. You can strain the urine at home.  You can take the oxycodone up to 4 times a day.  I would recommend adding Tylenol, max 4 g/day.    For hand surgery, you can call Dr. Coco Malave at [phone] to make an appointment. She has several offices but does come to [place].

## 2023-11-12 LAB — BACTERIA UR CULT: NO GROWTH

## 2023-12-06 ENCOUNTER — OFFICE VISIT (OUTPATIENT)
Dept: PRIMARY CARE | Facility: CLINIC | Age: 48
End: 2023-12-06
Payer: COMMERCIAL

## 2023-12-06 VITALS
TEMPERATURE: 97.6 F | WEIGHT: 288 LBS | RESPIRATION RATE: 18 BRPM | HEIGHT: 71 IN | HEART RATE: 93 BPM | BODY MASS INDEX: 40.32 KG/M2 | SYSTOLIC BLOOD PRESSURE: 130 MMHG | DIASTOLIC BLOOD PRESSURE: 90 MMHG

## 2023-12-06 DIAGNOSIS — K21.00 GASTROESOPHAGEAL REFLUX DISEASE WITH ESOPHAGITIS WITHOUT HEMORRHAGE: ICD-10-CM

## 2023-12-06 DIAGNOSIS — R53.83 OTHER FATIGUE: ICD-10-CM

## 2023-12-06 DIAGNOSIS — R14.0 BLOATING: Primary | ICD-10-CM

## 2023-12-06 DIAGNOSIS — R73.09 ELEVATED GLUCOSE: ICD-10-CM

## 2023-12-06 DIAGNOSIS — R79.89 ELEVATED SERUM CREATININE: ICD-10-CM

## 2023-12-06 PROCEDURE — 99214 OFFICE O/P EST MOD 30 MIN: CPT | Performed by: FAMILY MEDICINE

## 2023-12-06 PROCEDURE — 3075F SYST BP GE 130 - 139MM HG: CPT | Performed by: FAMILY MEDICINE

## 2023-12-06 PROCEDURE — 3080F DIAST BP >= 90 MM HG: CPT | Performed by: FAMILY MEDICINE

## 2023-12-06 PROCEDURE — 1036F TOBACCO NON-USER: CPT | Performed by: FAMILY MEDICINE

## 2023-12-06 RX ORDER — PANTOPRAZOLE SODIUM 40 MG/1
40 TABLET, DELAYED RELEASE ORAL DAILY
Qty: 30 TABLET | Refills: 1 | Status: SHIPPED | OUTPATIENT
Start: 2023-12-06 | End: 2024-03-14 | Stop reason: SDUPTHER

## 2023-12-06 NOTE — PROGRESS NOTES
Johnny Alegria is a 48 y.o. male here today for   Chief Complaint   Patient presents with    Bloated     Stomach        HPI     6 months feels hungry and then eats a feels bloated.  A lot of burping.  No heartburn.  Sxs occur mostly at night.  Takes omeprazole daily.  No change in BM's - goes once daily.  Diet is not good.  Worse with Chipotle.  Eats some fast food.  He says he usually goes to bed about 9 PM and he often eats right before he goes to bed.  No UTI or stone sxs.      More tired too.  Vision is a little blurry at times but only in the morning when he wakes up.  It seems to improve after a short time.  He has no previous history of diabetes.  He denies excessive thirst or urination.  He has no pain in his eyes or excessive watering.  He has a history of obstructive sleep apnea but he says that he wears his AutoPap device all night and every night.      Current Outpatient Medications:     albuterol 90 mcg/actuation inhaler, Inhale 1-2 puffs every 6 hours if needed., Disp: , Rfl:     atorvastatin (Lipitor) 20 mg tablet, Take 1 tablet (20 mg) by mouth once daily., Disp: 30 tablet, Rfl: 6    omeprazole (PriLOSEC) 40 mg DR capsule, Take 1 capsule (40 mg) by mouth once daily., Disp: 30 capsule, Rfl: 6    ramipril (Altace) 10 mg capsule, Take 1 capsule (10 mg) by mouth once daily., Disp: 30 capsule, Rfl: 6    pantoprazole (ProtoNix) 40 mg EC tablet, Take 1 tablet (40 mg) by mouth once daily. Do not crush, chew, or split., Disp: 30 tablet, Rfl: 1    Patient Active Problem List   Diagnosis    Agatston coronary artery calcium score less than 100    Chest pain at rest    Primary hypertension    Gastroesophageal reflux disease with esophagitis without hemorrhage    Mixed hyperlipidemia    Noncompliance    Obstructive sleep apnea    Prediabetes    Shortness of breath    Vision blurred    Class 2 severe obesity with serious comorbidity and body mass index (BMI) of 38.0 to 38.9 in adult (CMS/[facility])    Pain of left calf     Superficial thrombosis of leg, left    Hand trauma, right, sequela    Bloating    Elevated serum creatinine    Elevated glucose         Recent Results (from the past 672 hour(s))   CBC with Differential    Collection Time: 11/10/23 12:05 PM   Result Value Ref Range    WBC 13.6 (H) 4.4 - 11.3 x10*3/uL    nRBC 0.0 0.0 - 0.0 /100 WBCs    RBC 5.19 4.50 - 5.90 x10*6/uL    Hemoglobin 15.7 13.5 - 17.5 g/dL    Hematocrit 47.3 41.0 - 52.0 %    MCV 91 80 - 100 fL    MCH 30.3 26.0 - 34.0 pg    MCHC 33.2 32.0 - 36.0 g/dL    RDW 11.9 11.5 - 14.5 %    Platelets 295 150 - 450 x10*3/uL    Neutrophils % 84.2 40.0 - 80.0 %    Immature Granulocytes %, Automated 1.0 (H) 0.0 - 0.9 %    Lymphocytes % 8.7 13.0 - 44.0 %    Monocytes % 5.9 2.0 - 10.0 %    Eosinophils % 0.1 0.0 - 6.0 %    Basophils % 0.1 0.0 - 2.0 %    Neutrophils Absolute 11.39 (H) 1.20 - 7.70 x10*3/uL    Immature Granulocytes Absolute, Automated 0.14 0.00 - 0.70 x10*3/uL    Lymphocytes Absolute 1.18 (L) 1.20 - 4.80 x10*3/uL    Monocytes Absolute 0.80 0.10 - 1.00 x10*3/uL    Eosinophils Absolute 0.02 0.00 - 0.70 x10*3/uL    Basophils Absolute 0.02 0.00 - 0.10 x10*3/uL   Comprehensive Metabolic Panel    Collection Time: 11/10/23 12:05 PM   Result Value Ref Range    Glucose 146 (H) 74 - 99 mg/dL    Sodium 140 136 - 145 mmol/L    Potassium 4.1 3.5 - 5.3 mmol/L    Chloride 104 98 - 107 mmol/L    Bicarbonate 27 21 - 32 mmol/L    Anion Gap 13 10 - 20 mmol/L    Urea Nitrogen 16 6 - 23 mg/dL    Creatinine 1.45 (H) 0.50 - 1.30 mg/dL    eGFR 59 (L) >60 mL/min/1.73m*2    Calcium 9.8 8.6 - 10.3 mg/dL    Albumin 4.7 3.4 - 5.0 g/dL    Alkaline Phosphatase 89 33 - 120 U/L    Total Protein 8.5 (H) 6.4 - 8.2 g/dL    AST 29 9 - 39 U/L    Bilirubin, Total 0.6 0.0 - 1.2 mg/dL    ALT 50 10 - 52 U/L   Urinalysis with Reflex Microscopic and Culture    Collection Time: 11/10/23  3:00 PM   Result Value Ref Range    Color, Urine Straw Straw, Yellow    Appearance, Urine Clear Clear    Specific  "[place], Urine 1.043 (N) 1.005 - 1.035    pH, Urine 5.0 5.0, 5.5, 6.0, 6.5, 7.0, 7.5, 8.0    Protein, Urine NEGATIVE NEGATIVE mg/dL    Glucose, Urine NEGATIVE NEGATIVE mg/dL    Blood, Urine SMALL (1+) (A) NEGATIVE    Ketones, Urine NEGATIVE NEGATIVE mg/dL    Bilirubin, Urine NEGATIVE NEGATIVE    Urobilinogen, Urine <2.0 <2.0 mg/dL    Nitrite, Urine NEGATIVE NEGATIVE    Leukocyte Esterase, Urine SMALL (1+) (A) NEGATIVE   Urine Gray Tube    Collection Time: 11/10/23  3:00 PM   Result Value Ref Range    Extra Tube Hold for add-ons.    Microscopic Only, Urine    Collection Time: 11/10/23  3:00 PM   Result Value Ref Range    WBC, Urine 11-20 (A) 1-5, NONE /HPF    RBC, Urine 3-5 NONE, 1-2, 3-5 /HPF    Squamous Epithelial Cells, Urine 1-9 (SPARSE) Reference range not established. /HPF    Bacteria, Urine 1+ (A) NONE SEEN /HPF    Hyaline Casts, Urine OCCASIONAL (A) NONE /LPF   Urine Culture    Collection Time: 11/10/23  3:00 PM    Specimen: Clean Catch/Voided; Urine   Result Value Ref Range    Urine Culture No growth         Objective    Visit Vitals  /90   Pulse 93   Temp 36.4 °C (97.6 °F)   Resp 18   Ht 1.803 m (5' 11\")   Wt 131 kg (288 lb)   BMI 40.17 kg/m²     Body mass index is 40.17 kg/m².     Physical Exam   General - Not in acute distress and cooperative.  Build & Nutrition - Well developed  Posture - Normal  Gait - Normal  Mental Status - alert and oriented x 3    Head - Normocephalic    Neck - Thyroid normal size    Eyes - Bilateral - Sclera clear and lids pink without edema or mass.      Skin - Warm and dry with no rashes on visible skin    Lungs - Clear to auscultation and normal breathing effort    Cardiovascular - RRR and no murmurs, rubs or thrill.    Peripheral Vascular - Bilateral - no edema present    Neuropsychiatric - normal mood and affect    Abdomen-soft and nontender and nondistended with normal bowel sounds throughout.  No CVA tenderness.  No organomegaly.    Assessment    1. Bloating   "   The patient's symptoms of bloating in the evening may be secondary to poor diet and late eating or it may be a manifestation of reflux that is not well controlled.  We are going to change from omeprazole to pantoprazole daily.  I told him not to eat after 6:00 and we discussed healthy changes he can make with his diet.  He will monitor and if his symptoms do not improve over the next month he should make a follow-up appointment for further evaluation and possible GI referral.  No signs or symptoms of an immediately serious or dangerous etiology.  He did have a recent CT scan of the abdomen and pelvis through the ER and I reviewed this and there was no dangerous findings at the time.     2. Elevated serum creatinine  Comprehensive Metabolic Panel   Appropriate labs ordered or reviewed.   3. Elevated glucose  Hemoglobin A1C   Appropriate labs ordered or reviewed.   4. Other fatigue  Comprehensive Metabolic Panel, CBC, Hemoglobin A1C, TSH with reflex to Free T4 if abnormal, Vitamin D 25-Hydroxy,Total (for eval of Vitamin D levels), Ferritin, Vitamin B12, Folate   Appropriate labs ordered or reviewed.   5. Gastroesophageal reflux disease with esophagitis without hemorrhage  pantoprazole (ProtoNix) 40 mg EC tablet

## 2023-12-07 ENCOUNTER — TELEPHONE (OUTPATIENT)
Dept: PRIMARY CARE | Facility: CLINIC | Age: 48
End: 2023-12-07
Payer: COMMERCIAL

## 2023-12-07 NOTE — TELEPHONE ENCOUNTER
Pt calling stating he was seen yesterday and his BP med wasn't called in.  [facility]

## 2024-02-14 ENCOUNTER — TELEPHONE (OUTPATIENT)
Dept: PRIMARY CARE | Facility: CLINIC | Age: 49
End: 2024-02-14
Payer: COMMERCIAL

## 2024-02-14 NOTE — TELEPHONE ENCOUNTER
Pt is on pantoprazole and ramipril. Stated his balls are sweaty all the time. Is this a possible side effect and what can he do.

## 2024-03-14 ENCOUNTER — OFFICE VISIT (OUTPATIENT)
Dept: PRIMARY CARE | Facility: CLINIC | Age: 49
End: 2024-03-14
Payer: COMMERCIAL

## 2024-03-14 VITALS
WEIGHT: 280 LBS | HEIGHT: 71 IN | HEART RATE: 108 BPM | RESPIRATION RATE: 20 BRPM | OXYGEN SATURATION: 95 % | TEMPERATURE: 97 F | BODY MASS INDEX: 39.2 KG/M2 | DIASTOLIC BLOOD PRESSURE: 86 MMHG | SYSTOLIC BLOOD PRESSURE: 138 MMHG

## 2024-03-14 DIAGNOSIS — I10 PRIMARY HYPERTENSION: ICD-10-CM

## 2024-03-14 DIAGNOSIS — H66.003 NON-RECURRENT ACUTE SUPPURATIVE OTITIS MEDIA OF BOTH EARS WITHOUT SPONTANEOUS RUPTURE OF TYMPANIC MEMBRANES: ICD-10-CM

## 2024-03-14 DIAGNOSIS — K21.00 GASTROESOPHAGEAL REFLUX DISEASE WITH ESOPHAGITIS WITHOUT HEMORRHAGE: ICD-10-CM

## 2024-03-14 DIAGNOSIS — E78.2 MIXED HYPERLIPIDEMIA: ICD-10-CM

## 2024-03-14 DIAGNOSIS — G47.33 OBSTRUCTIVE SLEEP APNEA: Primary | ICD-10-CM

## 2024-03-14 DIAGNOSIS — R73.03 PREDIABETES: ICD-10-CM

## 2024-03-14 PROBLEM — M79.662 PAIN OF LEFT CALF: Status: RESOLVED | Noted: 2023-07-13 | Resolved: 2024-03-14

## 2024-03-14 PROBLEM — R73.09 ELEVATED GLUCOSE: Status: RESOLVED | Noted: 2023-12-06 | Resolved: 2024-03-14

## 2024-03-14 PROBLEM — R06.02 SHORTNESS OF BREATH: Status: RESOLVED | Noted: 2023-03-16 | Resolved: 2024-03-14

## 2024-03-14 PROCEDURE — 99215 OFFICE O/P EST HI 40 MIN: CPT | Performed by: FAMILY MEDICINE

## 2024-03-14 PROCEDURE — 3075F SYST BP GE 130 - 139MM HG: CPT | Performed by: FAMILY MEDICINE

## 2024-03-14 PROCEDURE — 3079F DIAST BP 80-89 MM HG: CPT | Performed by: FAMILY MEDICINE

## 2024-03-14 PROCEDURE — 1036F TOBACCO NON-USER: CPT | Performed by: FAMILY MEDICINE

## 2024-03-14 RX ORDER — CEFDINIR 300 MG/1
300 CAPSULE ORAL 2 TIMES DAILY
Qty: 20 CAPSULE | Refills: 0 | Status: SHIPPED | OUTPATIENT
Start: 2024-03-14 | End: 2024-03-24

## 2024-03-14 RX ORDER — ATORVASTATIN CALCIUM 20 MG/1
20 TABLET, FILM COATED ORAL DAILY
Qty: 30 TABLET | Refills: 6 | Status: SHIPPED | OUTPATIENT
Start: 2024-03-14

## 2024-03-14 RX ORDER — PANTOPRAZOLE SODIUM 40 MG/1
40 TABLET, DELAYED RELEASE ORAL DAILY
Qty: 30 TABLET | Refills: 6 | Status: SHIPPED | OUTPATIENT
Start: 2024-03-14

## 2024-03-14 RX ORDER — RAMIPRIL 10 MG/1
10 CAPSULE ORAL DAILY
Qty: 30 CAPSULE | Refills: 6 | Status: SHIPPED | OUTPATIENT
Start: 2024-03-14

## 2024-03-14 NOTE — PROGRESS NOTES
Johnny Alegria is a 48 y.o. male here today for   Chief Complaint   Patient presents with    Hyperlipidemia    Hypertension    GERD        HTN recheck -- Patient denies chest pain, SOB, edema, palpitations on review.  Taking medication correctly and denies any side effects.    HLD recheck -- Patient taking medications correctly.  No SE's of muscle pain or joint pain.  No CP, edema, myalgias.      GERD recheck --patient reports that even taking pantoprazole every day he is still having frequent heartburn and reflux symptoms.  Taking pantoprazole daily but ran out one week ago.  His heartburn has gotten even worse since then.  He denies any dysphagia or food sticking.    Recheck RONALD -- Patient reports using the PAP device all night and every night.  Symptoms well controlled with no excessive daytime somnolence.  Has good energy and feels refreshed in the morning.  No problems with equipment.   He needs new supplies but DME never sent them.      Pre-diabetes recheck -- Patient feeling well.  No CP, numbness of feet, blurred vision, polyuria.  Patient is trying to follow the recommended diet and control weight.      Also with cough and earaches.   Since about 5 days ago.  No fever.   There is no known exposure to COVID or influenza.  His cough is minor.  He denies any sinus pain.    No CP with exertion ever.  He is at high risk for coronary artery disease because of hypertension, hyperlipidemia, obstructive sleep apnea, prediabetes, obesity.      Current Outpatient Medications:     albuterol 90 mcg/actuation inhaler, Inhale 1-2 puffs every 6 hours if needed., Disp: , Rfl:     atorvastatin (Lipitor) 20 mg tablet, Take 1 tablet (20 mg) by mouth once daily., Disp: 30 tablet, Rfl: 6    cefdinir (Omnicef) 300 mg capsule, Take 1 capsule (300 mg) by mouth 2 times a day for 10 days., Disp: 20 capsule, Rfl: 0    pantoprazole (ProtoNix) 40 mg EC tablet, Take 1 tablet (40 mg) by mouth once daily. Do not crush, chew, or split.,  "Disp: 30 tablet, Rfl: 6    ramipril (Altace) 10 mg capsule, Take 1 capsule (10 mg) by mouth once daily., Disp: 30 capsule, Rfl: 6    Patient Active Problem List   Diagnosis    Agatston coronary artery calcium score less than 100    Chest pain at rest    Primary hypertension    Gastroesophageal reflux disease with esophagitis without hemorrhage    Mixed hyperlipidemia    Noncompliance    Obstructive sleep apnea    Prediabetes    Vision blurred    Class 2 severe obesity with serious comorbidity and body mass index (BMI) of 38.0 to 38.9 in adult (CMS/[facility])    Superficial thrombosis of leg, left    Hand trauma, right, sequela    Bloating    Elevated serum creatinine         No results found for this or any previous visit (from the past 672 hour(s)).     Objective    Visit Vitals    Visit Vitals  /86   Pulse 108   Temp 36.1 °C (97 °F)   Resp 20   Ht 1.803 m (5' 11\")   Wt 127 kg (280 lb)   SpO2 95%   BMI 39.05 kg/m²   Smoking Status Never   BSA 2.52 m²       Body mass index is 39.05 kg/m².     Physical Exam   General - Not in acute distress and cooperative.  Build & Nutrition - Well developed  Posture - Normal  Gait - Normal  Mental Status - alert and oriented x 3    Head - Normocephalic    Neck - Thyroid normal size    Eyes - Bilateral - Sclera clear and lids pink without edema or mass.      Skin - Warm and dry with no rashes on visible skin    Lungs - Clear to auscultation and normal breathing effort    Cardiovascular - RRR and no murmurs, rubs or thrill.    Peripheral Vascular - Bilateral - no edema present    Neuropsychiatric - normal mood and affect    Ears-bilateral tympanic membranes are dull and pink with fluid behind.    Assessment    1. 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  Due to his high risk for coronary artery disease we discussed options for screening and I am going to order a " cardiac CT for calcium score to further evaluate his risk.     2. Mixed hyperlipidemia  atorvastatin (Lipitor) 20 mg tablet, CT cardiac scoring wo IV contrast, Comprehensive Metabolic Panel, CBC, Lipid Panel   Condition well controlled.  No change in current treatment regimen.  Refill given of current medication.  Appropriate labs ordered or reviewed.  Make a follow up appointment with me for recheck in 6 months.       3. Gastroesophageal reflux disease with esophagitis without hemorrhage  pantoprazole (ProtoNix) 40 mg EC tablet, Referral to Gastroenterology   This is not well-controlled even with pantoprazole daily.  I am going to refer him to GI for further evaluation and EGD to make sure there is not a dangerous underlying etiology.  He will continue pantoprazole for now since it does work better than omeprazole.     4. Primary hypertension  ramipril (Altace) 10 mg capsule, CT cardiac scoring wo IV contrast, Comprehensive Metabolic Panel, CBC, Lipid Panel   Condition well controlled.  No change in current treatment regimen.  Refill given of current medication.  Appropriate labs ordered or reviewed.  Make a follow up appointment with me for recheck in 6 months.       5. Prediabetes  CT cardiac scoring wo IV contrast, Hemoglobin A1C   Appropriate labs ordered or reviewed.     6. Non-recurrent acute suppurative otitis media of both ears without spontaneous rupture of tympanic membranes  cefdinir (Omnicef) 300 mg capsule   We will treat this with cefdinir x 10 days.  Call or RTO if symptoms worsen or don't fully resolve.  Increase fluid intake.  Rest.  May use OTC symptomatic medications as needed at the appropriate dose.         Orders Placed This Encounter      atorvastatin (Lipitor) 20 mg tablet      cefdinir (Omnicef) 300 mg capsule      pantoprazole (ProtoNix) 40 mg EC tablet      ramipril (Altace) 10 mg capsule       Orders Placed This Encounter   Procedures    CT cardiac scoring wo IV contrast    Comprehensive  Metabolic Panel    CBC    Lipid Panel    Hemoglobin A1C    Referral to Gastroenterology        New Medications Ordered This Visit   Medications    atorvastatin (Lipitor) 20 mg tablet     Sig: Take 1 tablet (20 mg) by mouth once daily.     Dispense:  30 tablet     Refill:  6    pantoprazole (ProtoNix) 40 mg EC tablet     Sig: Take 1 tablet (40 mg) by mouth once daily. Do not crush, chew, or split.     Dispense:  30 tablet     Refill:  6    ramipril (Altace) 10 mg capsule     Sig: Take 1 capsule (10 mg) by mouth once daily.     Dispense:  30 capsule     Refill:  6    cefdinir (Omnicef) 300 mg capsule     Sig: Take 1 capsule (300 mg) by mouth 2 times a day for 10 days.     Dispense:  20 capsule     Refill:  0

## 2024-03-22 ENCOUNTER — APPOINTMENT (OUTPATIENT)
Dept: PRIMARY CARE | Facility: CLINIC | Age: 49
End: 2024-03-22
Payer: COMMERCIAL

## 2024-03-27 ENCOUNTER — TELEPHONE (OUTPATIENT)
Dept: PRIMARY CARE | Facility: CLINIC | Age: 49
End: 2024-03-27
Payer: COMMERCIAL

## 2024-03-27 NOTE — TELEPHONE ENCOUNTER
Last office visit 3/14/24. Got rx for cough. Took last pill 2 days ago. Still with cough. Please advise.

## 2024-06-23 DIAGNOSIS — K21.9 GASTROESOPHAGEAL REFLUX DISEASE WITHOUT ESOPHAGITIS: ICD-10-CM

## 2024-06-24 RX ORDER — OMEPRAZOLE 40 MG/1
40 CAPSULE, DELAYED RELEASE ORAL DAILY
Qty: 30 CAPSULE | Refills: 6 | OUTPATIENT
Start: 2024-06-24

## 2024-09-16 ENCOUNTER — APPOINTMENT (OUTPATIENT)
Dept: PRIMARY CARE | Facility: CLINIC | Age: 49
End: 2024-09-16
Payer: COMMERCIAL

## 2024-09-16 PROBLEM — E66.812 OBESITY, CLASS II, BMI 35-39.9: Status: ACTIVE | Noted: 2024-09-03

## 2024-11-12 DIAGNOSIS — K21.00 GASTROESOPHAGEAL REFLUX DISEASE WITH ESOPHAGITIS WITHOUT HEMORRHAGE: ICD-10-CM

## 2024-11-12 RX ORDER — PANTOPRAZOLE SODIUM 40 MG/1
40 TABLET, DELAYED RELEASE ORAL DAILY
Qty: 30 TABLET | Refills: 6 | OUTPATIENT
Start: 2024-11-12

## 2024-11-12 NOTE — TELEPHONE ENCOUNTER
He will need an appointment before I can refill.   X Size Of Lesion In Cm (Optional): 0 Detail Level: Detailed

## 2024-11-25 ENCOUNTER — OFFICE VISIT (OUTPATIENT)
Dept: PRIMARY CARE | Facility: CLINIC | Age: 49
End: 2024-11-25
Payer: COMMERCIAL

## 2024-11-25 VITALS
BODY MASS INDEX: 41.17 KG/M2 | RESPIRATION RATE: 18 BRPM | SYSTOLIC BLOOD PRESSURE: 130 MMHG | DIASTOLIC BLOOD PRESSURE: 78 MMHG | WEIGHT: 295.2 LBS | TEMPERATURE: 98.9 F | HEART RATE: 72 BPM

## 2024-11-25 DIAGNOSIS — K21.00 GASTROESOPHAGEAL REFLUX DISEASE WITH ESOPHAGITIS WITHOUT HEMORRHAGE: ICD-10-CM

## 2024-11-25 DIAGNOSIS — R05.1 ACUTE COUGH: ICD-10-CM

## 2024-11-25 DIAGNOSIS — J01.90 ACUTE BACTERIAL SINUSITIS: Primary | ICD-10-CM

## 2024-11-25 DIAGNOSIS — R09.81 NASAL CONGESTION: ICD-10-CM

## 2024-11-25 DIAGNOSIS — B96.89 ACUTE BACTERIAL SINUSITIS: Primary | ICD-10-CM

## 2024-11-25 LAB
POC RAPID INFLUENZA A: NEGATIVE
POC RAPID INFLUENZA B: NEGATIVE
POC SARS-COV-2 AG BINAX: NORMAL

## 2024-11-25 PROCEDURE — 99213 OFFICE O/P EST LOW 20 MIN: CPT | Performed by: NURSE PRACTITIONER

## 2024-11-25 PROCEDURE — 3078F DIAST BP <80 MM HG: CPT | Performed by: NURSE PRACTITIONER

## 2024-11-25 PROCEDURE — 87811 SARS-COV-2 COVID19 W/OPTIC: CPT | Performed by: NURSE PRACTITIONER

## 2024-11-25 PROCEDURE — 87804 INFLUENZA ASSAY W/OPTIC: CPT | Performed by: NURSE PRACTITIONER

## 2024-11-25 PROCEDURE — 3075F SYST BP GE 130 - 139MM HG: CPT | Performed by: NURSE PRACTITIONER

## 2024-11-25 RX ORDER — BENZONATATE 100 MG/1
100 CAPSULE ORAL 3 TIMES DAILY PRN
Qty: 42 CAPSULE | Refills: 0 | Status: SHIPPED | OUTPATIENT
Start: 2024-11-25 | End: 2024-12-09

## 2024-11-25 RX ORDER — PANTOPRAZOLE SODIUM 40 MG/1
40 TABLET, DELAYED RELEASE ORAL DAILY
Qty: 30 TABLET | Refills: 6 | Status: SHIPPED | OUTPATIENT
Start: 2024-11-25

## 2024-11-25 RX ORDER — ALBUTEROL SULFATE 90 UG/1
1-2 INHALANT RESPIRATORY (INHALATION) EVERY 6 HOURS PRN
Qty: 18 G | Refills: 0 | Status: SHIPPED | OUTPATIENT
Start: 2024-11-25

## 2024-11-25 RX ORDER — AZITHROMYCIN 250 MG/1
TABLET, FILM COATED ORAL
Qty: 6 TABLET | Refills: 0 | Status: SHIPPED | OUTPATIENT
Start: 2024-11-25 | End: 2024-11-30

## 2024-11-25 ASSESSMENT — ENCOUNTER SYMPTOMS
SORE THROAT: 0
HEADACHES: 0
CHILLS: 0
SHORTNESS OF BREATH: 1
WHEEZING: 0
COUGH: 1
RHINORRHEA: 1
MYALGIAS: 0
PALPITATIONS: 0
DIARRHEA: 0
SINUS PRESSURE: 1
NAUSEA: 0
VOMITING: 0
FEVER: 0

## 2024-11-25 NOTE — PROGRESS NOTES
Subjective   Johnny Alegria is a 49 y.o. male who presents for Sick Visit.  HPI  Nasal congestion and cough for 3-4 days.   Review of Systems   Constitutional:  Negative for chills and fever.   HENT:  Positive for congestion, postnasal drip, rhinorrhea and sinus pressure. Negative for sore throat.    Respiratory:  Positive for cough (thicik, clear) and shortness of breath. Negative for wheezing.    Cardiovascular:  Negative for chest pain and palpitations.   Gastrointestinal:  Negative for diarrhea, nausea and vomiting.   Musculoskeletal:  Negative for myalgias.   Neurological:  Negative for headaches.     Objective   Physical Exam  Vitals reviewed.   Constitutional:       General: He is not in acute distress.     Appearance: Normal appearance. He is obese. He is ill-appearing. He is not toxic-appearing.   HENT:      Head: Normocephalic.      Nose: Congestion present.      Mouth/Throat:      Mouth: Mucous membranes are moist.      Pharynx: Posterior oropharyngeal erythema present.   Eyes:      Conjunctiva/sclera: Conjunctivae normal.   Cardiovascular:      Rate and Rhythm: Regular rhythm. Tachycardia present.      Pulses: Normal pulses.      Heart sounds: No murmur heard.  Pulmonary:      Effort: Pulmonary effort is normal.      Breath sounds: Normal breath sounds.      Comments: Harshk, moist cough  Abdominal:      General: Bowel sounds are normal.      Palpations: Abdomen is soft.   Musculoskeletal:      Cervical back: Neck supple.   Lymphadenopathy:      Cervical: Cervical adenopathy present.   Skin:     General: Skin is warm and dry.   Neurological:      General: No focal deficit present.      Mental Status: He is alert and oriented to person, place, and time.   Psychiatric:         Mood and Affect: Mood normal.         Thought Content: Thought content normal.       /78   Pulse 72   Temp 37.2 °C (98.9 °F) (Temporal)   Resp 18   Wt 134 kg (295 lb 3.2 oz)   BMI 41.17 kg/m²   Assessment/Plan   Problem  List Items Addressed This Visit    None  Visit Diagnoses       Acute bacterial sinusitis    -  Primary    Relevant Medications    azithromycin (Zithromax) 250 mg tablet    Nasal congestion        Relevant Orders    POCT Influenza A/B manually resulted (Completed)    POCT BinaxNOW Covid-19 Ag Card manually resulted (Completed)    Acute cough        Relevant Medications    benzonatate (Tessalon) 100 mg capsule        Increase oral fluids/water, at least eight 8-oz glasses/day.  Get plenty of rest.  Cepacol lozenges and/or Chloraseptic spray PRN for sore throat. Salt water gargle or broth for comfort.  Alternate Tylenol/Ibuprofen PRN for body aches and/or fever  Saline nasal spray PRN for rhinitis.  Guaifenessin/Guaifenissin DM PRN for cough  Flonase nasal spray.

## 2025-01-29 DIAGNOSIS — R05.1 ACUTE COUGH: ICD-10-CM

## 2025-01-29 RX ORDER — OMEPRAZOLE 40 MG/1
40 CAPSULE, DELAYED RELEASE ORAL DAILY
Qty: 30 CAPSULE | Refills: 6 | OUTPATIENT
Start: 2025-01-29

## 2025-03-14 RX ORDER — ALBUTEROL SULFATE 90 UG/1
1-2 INHALANT RESPIRATORY (INHALATION) EVERY 6 HOURS PRN
OUTPATIENT
Start: 2025-03-14

## 2025-04-16 ENCOUNTER — TELEPHONE (OUTPATIENT)
Dept: PEDIATRICS | Facility: CLINIC | Age: 50
End: 2025-04-16
Payer: COMMERCIAL

## 2025-04-16 DIAGNOSIS — I10 PRIMARY HYPERTENSION: ICD-10-CM

## 2025-04-16 RX ORDER — RAMIPRIL 10 MG/1
10 CAPSULE ORAL DAILY
Qty: 30 CAPSULE | Refills: 0 | Status: SHIPPED | OUTPATIENT
Start: 2025-04-16

## 2025-04-16 NOTE — TELEPHONE ENCOUNTER
Pt is switching to you.  He wanted to know if you'd be willing to send in his bp med before his appt on Tuesday since he is out.

## 2025-04-22 ENCOUNTER — APPOINTMENT (OUTPATIENT)
Dept: PRIMARY CARE | Facility: CLINIC | Age: 50
End: 2025-04-22
Payer: COMMERCIAL

## 2025-04-22 VITALS
HEIGHT: 71 IN | WEIGHT: 262 LBS | TEMPERATURE: 98.1 F | RESPIRATION RATE: 16 BRPM | OXYGEN SATURATION: 94 % | SYSTOLIC BLOOD PRESSURE: 124 MMHG | DIASTOLIC BLOOD PRESSURE: 87 MMHG | HEART RATE: 83 BPM | BODY MASS INDEX: 36.68 KG/M2

## 2025-04-22 DIAGNOSIS — I10 PRIMARY HYPERTENSION: ICD-10-CM

## 2025-04-22 DIAGNOSIS — G47.33 OBSTRUCTIVE SLEEP APNEA: ICD-10-CM

## 2025-04-22 DIAGNOSIS — S69.91XS HAND TRAUMA, RIGHT, SEQUELA: Primary | ICD-10-CM

## 2025-04-22 DIAGNOSIS — R73.03 PREDIABETES: ICD-10-CM

## 2025-04-22 DIAGNOSIS — Z12.5 SCREENING FOR PROSTATE CANCER: ICD-10-CM

## 2025-04-22 DIAGNOSIS — R73.9 ELEVATED BLOOD SUGAR: ICD-10-CM

## 2025-04-22 DIAGNOSIS — M24.541 FLEXION CONTRACTURE OF JOINT OF HAND, RIGHT: ICD-10-CM

## 2025-04-22 DIAGNOSIS — H53.8 VISION BLURRED: ICD-10-CM

## 2025-04-22 DIAGNOSIS — E78.2 MIXED HYPERLIPIDEMIA: ICD-10-CM

## 2025-04-22 DIAGNOSIS — K21.00 GASTROESOPHAGEAL REFLUX DISEASE WITH ESOPHAGITIS WITHOUT HEMORRHAGE: ICD-10-CM

## 2025-04-22 PROCEDURE — 99214 OFFICE O/P EST MOD 30 MIN: CPT | Performed by: NURSE PRACTITIONER

## 2025-04-22 RX ORDER — RAMIPRIL 10 MG/1
10 CAPSULE ORAL DAILY
Qty: 30 CAPSULE | Refills: 0 | Status: SHIPPED | OUTPATIENT
Start: 2025-04-22 | End: 2025-04-22 | Stop reason: ALTCHOICE

## 2025-04-22 RX ORDER — PANTOPRAZOLE SODIUM 40 MG/1
40 TABLET, DELAYED RELEASE ORAL DAILY
Qty: 90 TABLET | Refills: 3 | Status: SHIPPED | OUTPATIENT
Start: 2025-04-22 | End: 2026-04-22

## 2025-04-22 RX ORDER — LISINOPRIL 20 MG/1
20 TABLET ORAL DAILY
Qty: 100 TABLET | Refills: 3 | Status: SHIPPED | OUTPATIENT
Start: 2025-04-22 | End: 2026-05-27

## 2025-04-22 ASSESSMENT — ENCOUNTER SYMPTOMS
CONSTIPATION: 0
WEAKNESS: 0
CHILLS: 0
FATIGUE: 1
UNEXPECTED WEIGHT CHANGE: 0
COUGH: 0
HEADACHES: 0
WHEEZING: 0
DIARRHEA: 0
NAUSEA: 0
APPETITE CHANGE: 0
LIGHT-HEADEDNESS: 1
VOMITING: 0
SHORTNESS OF BREATH: 0
FEVER: 0
PALPITATIONS: 0

## 2025-04-22 NOTE — PROGRESS NOTES
"Subjective   Johnny Alegria is a 49 y.o. male who presents for Establish Care, Hypertension, and Heartburn.    HPI  Review of Systems   Constitutional:  Positive for fatigue. Negative for appetite change, chills, fever and unexpected weight change.   Eyes:  Positive for visual disturbance (blurry vision).   Respiratory:  Negative for cough, shortness of breath and wheezing.    Cardiovascular:  Negative for chest pain, palpitations and leg swelling.   Gastrointestinal:  Negative for constipation, diarrhea, nausea and vomiting.   Musculoskeletal:         Right hand 3rd digit contracture-h/o trauma   Neurological:  Positive for light-headedness (every now and then if he jumps up quickly from a crouch position). Negative for weakness and headaches.     Objective     Physical Exam  Constitutional:       Appearance: Normal appearance. He is obese. He is not ill-appearing.   Cardiovascular:      Rate and Rhythm: Normal rate.      Heart sounds: No murmur heard.  Pulmonary:      Effort: Pulmonary effort is normal.      Breath sounds: Normal breath sounds.   Musculoskeletal:      Right lower leg: No edema.      Left lower leg: No edema.   Neurological:      Mental Status: He is alert and oriented to person, place, and time.   Psychiatric:         Mood and Affect: Mood normal.         Thought Content: Thought content normal.     /87   Pulse 83   Temp 36.7 °C (98.1 °F)   Resp 16   Ht 1.803 m (5' 11\")   Wt 119 kg (262 lb)   SpO2 94%   BMI 36.54 kg/m²     Assessment/Plan     Problem List Items Addressed This Visit       Primary hypertension    Relevant Medications    lisinopril 20 mg tablet    Other Relevant Orders    Comprehensive Metabolic Panel    Gastroesophageal reflux disease with esophagitis without hemorrhage    Relevant Medications    pantoprazole (ProtoNix) 40 mg EC tablet    Other Relevant Orders    CBC    Vitamin B12    Mixed hyperlipidemia    Relevant Orders    Lipid Panel    Obstructive sleep apnea    " Prediabetes    Relevant Orders    Hemoglobin A1C    Referral to Ophthalmology    Vision blurred    Hand trauma, right, sequela - Primary    Relevant Orders    Referral to Orthopedics and Sports Medicine     Other Visit Diagnoses         Flexion contracture of joint of hand, right        Relevant Orders    Referral to Orthopedics and Sports Medicine    Referral to Ophthalmology      Elevated blood sugar        Relevant Orders    Hemoglobin A1C      Screening for prostate cancer        Relevant Orders    Prostate Specific Antigen

## 2025-08-04 ENCOUNTER — HOSPITAL ENCOUNTER (OUTPATIENT)
Dept: RADIOLOGY | Facility: HOSPITAL | Age: 50
Discharge: HOME | End: 2025-08-04
Payer: COMMERCIAL

## 2025-08-04 ENCOUNTER — APPOINTMENT (OUTPATIENT)
Dept: ORTHOPEDIC SURGERY | Facility: CLINIC | Age: 50
End: 2025-08-04
Payer: COMMERCIAL

## 2025-08-04 DIAGNOSIS — M79.641 RIGHT HAND PAIN: ICD-10-CM

## 2025-08-04 PROCEDURE — 73130 X-RAY EXAM OF HAND: CPT | Mod: RT

## 2025-08-04 PROCEDURE — 99213 OFFICE O/P EST LOW 20 MIN: CPT | Performed by: ORTHOPAEDIC SURGERY

## 2025-08-04 PROCEDURE — 73130 X-RAY EXAM OF HAND: CPT | Mod: RIGHT SIDE | Performed by: RADIOLOGY

## 2025-08-04 NOTE — PROGRESS NOTES
History of Present Illness  Chief Complaint   Patient presents with    Right Hand - New Patient Visit     Xrays today       Patient here today as he was told to follow-up with an orthopedic hand surgeon in regards to potential reconstruction of his right middle and right ring finger.  Patient has no pain at baseline.  Patient has difficulty with certain grasping activities    Medical History[1]    Medication Documentation Review Audit       Reviewed by Eulalia Briggs MA (Medical Assistant) on 04/22/25 at 0851      Medication Order Taking? Sig Documenting Provider Last Dose Status     Discontinued 04/16/25 1646   atorvastatin (Lipitor) 20 mg tablet [id number]  Take 1 tablet (20 mg) by mouth once daily.   Patient not taking: Reported on 4/22/2025    David Day MD  Active   pantoprazole (ProtoNix) 40 mg EC tablet [id number] Yes Take 1 tablet (40 mg) by mouth once daily. Do not crush, chew, or split. MINI Schafer  Active     Discontinued 04/16/25 1649   ramipril (Altace) 10 mg capsule [id number] Yes Take 1 capsule (10 mg) by mouth once daily. MINI Schafer  Active                    RX Allergies[2]    Social History     Socioeconomic History    Marital status: Single     Spouse name: Not on file    Number of children: Not on file    Years of education: Not on file    Highest education level: Not on file   Occupational History    Not on file   Tobacco Use    Smoking status: Never    Smokeless tobacco: Never   Substance and Sexual Activity    Alcohol use: Not Currently     Comment: Occasional    Drug use: Never    Sexual activity: Not Currently     Partners: Female     Birth control/protection: None   Other Topics Concern    Not on file   Social History Narrative    Not on file     Social Drivers of Health     Financial Resource Strain: Not on file   Food Insecurity: Not on file   Transportation Needs: Not on file   Physical Activity: Not on file   Stress: Not on file   Social Connections: Not  on file   Intimate Partner Violence: Not on file   Housing Stability: Not on file       Surgical History[3]         Review of Systems   GENERAL: Negative for malaise, significant weight loss, fever  MUSCULOSKELETAL: see HPI  NEURO:  Negative      Exam  Right hand: Patient has flexible/correctable deformity of the middle and ring finger and is able to passively straighten these however does not have any active day of functional use in regards to ability to extend the ring and middle finger.  There is a contracture appreciated on the proximal phalanx portion of the ring and middle finger which again is passively correctable.     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       Assessment  Passively correctable however not actively functioning extensor mechanisms of the middle and ring finger right hand     Plan  Patient has essentially no pain is able to perform almost all activities and has supple passively correctable digits affecting the right middle and right ring finger.  Given his current functional status I do not feel that substantial improvement could be made with corrective surgery involving the middle and ring finger.  Recommended potential follow-up with Dr. Coco Malave for consideration of additional treatment modalities.          [1]   Past Medical History:  Diagnosis Date    Daytime somnolence 03/16/2023    Elevated glucose 12/06/2023    Other chest pain 10/20/2015    Atypical chest pain    Other sleep disorders 10/20/2015    Non-restorative sleep    Pain of left calf 07/13/2023    Poison ivy dermatitis 03/16/2023    Shortness of breath 03/16/2023    Snoring 10/20/2015    Snoring   [2] No Known  Allergies  [3] No past surgical history on file.

## 2025-08-06 ENCOUNTER — APPOINTMENT (OUTPATIENT)
Dept: OPHTHALMOLOGY | Facility: CLINIC | Age: 50
End: 2025-08-06
Payer: COMMERCIAL

## 2025-08-10 DIAGNOSIS — I10 PRIMARY HYPERTENSION: ICD-10-CM

## 2025-08-11 RX ORDER — RAMIPRIL 10 MG/1
10 CAPSULE ORAL DAILY
Qty: 30 CAPSULE | Refills: 6 | OUTPATIENT
Start: 2025-08-11

## (undated) DEVICE — GLOVE ORANGE PI 8 1/2   MSG9085

## (undated) DEVICE — GUIDEWIRE ORTHOPEDIC 1.4X150 MM TROCAR PT 1 END

## (undated) DEVICE — PADDING UNDERCAST W4INXL4YD COT FBR LO LINTING WYTEX

## (undated) DEVICE — SOLUTION IRRIG 3000ML 0.9% SOD CHL USP UROMATIC PLAS CONT

## (undated) DEVICE — CRADLE ARM W8.75XH12.5XL16IN FOAM SUPP ELEVATION VENT

## (undated) DEVICE — GLOVE SURG SZ 6 THK91MIL LTX FREE SYN POLYISOPRENE ANTI

## (undated) DEVICE — PADDING,UNDERCAST,COTTON, 3X4YD STERILE: Brand: MEDLINE

## (undated) DEVICE — SPLINT CAST W5XL30IN GRN STRENGTH PLSTR OF PARIS FAST SET

## (undated) DEVICE — 4-PORT MANIFOLD: Brand: NEPTUNE 2

## (undated) DEVICE — ZIMMER® STERILE DISPOSABLE TOURNIQUET CUFF WITH PROTECTIVE SLEEVE AND PLC, DUAL PORT, SINGLE BLADDER, 18 IN. (46 CM)

## (undated) DEVICE — SET IRR L100IN DIA0.280IN C100ML 2 NVENT PIERCING PINS 3

## (undated) DEVICE — ZIMMER® STERILE DISPOSABLE TOURNIQUET CUFF WITH PLC, DUAL PORT, SINGLE BLADDER, 18 IN. (46 CM)

## (undated) DEVICE — SOLUTION IV IRRIG POUR BRL 0.9% SODIUM CHL 2F7124

## (undated) DEVICE — ANTISEPTIC 16OZ H PEROX 1ST AID ORAL DEBRIDING AGNT

## (undated) DEVICE — SOLUTION IV IRRIG 1000ML POUR BTL 2F7114

## (undated) DEVICE — COVER,TABLE,60X90,STERILE: Brand: MEDLINE

## (undated) DEVICE — PADDING CAST W2INXL4YD COT LO LINTING WYTEX

## (undated) DEVICE — BANDAGE COMPR W4INXL10YD WHITE/BEIGE E MTRX HK LOOP CLSR

## (undated) DEVICE — 3M™ IOBAN™ 2 ANTIMICROBIAL INCISE DRAPE 6650EZ: Brand: IOBAN™ 2

## (undated) DEVICE — DRESSING,GAUZE,XEROFORM,CURAD,5"X9",ST: Brand: CURAD

## (undated) DEVICE — SPONGE LAP W18XL18IN WHT COT 4 PLY FLD STRUNG RADPQ DISP ST

## (undated) DEVICE — SUTURE FIBERWIRE SZ 2 W/ TAPERED NEEDLE BLUE L38IN NONABSORB BLU L26.5MM 1/2 CIRCLE AR7200

## (undated) DEVICE — READY WET SKIN SCRUB TRAY-LF: Brand: MEDLINE INDUSTRIES, INC.

## (undated) DEVICE — GLOVE SURG SZ 65 L12IN FNGR THK79MIL GRN LTX FREE

## (undated) DEVICE — DRAPE EQUIP CARM 72X42 IN RUBBER BND CLP

## (undated) DEVICE — GUIDEWIRE ORTHO 1.1X150 MM TROCAR PT 1 END

## (undated) DEVICE — BIT DRL L57 31MM DIA15MM FOR GLIDING H J LATCH CPL 15MM

## (undated) DEVICE — GAUZE,SPONGE,AVANT,4"X4",4PLY,STRL,10/TR: Brand: MEDLINE

## (undated) DEVICE — DOUBLE BASIN SET: Brand: MEDLINE INDUSTRIES, INC.

## (undated) DEVICE — COVER HNDL LT DISP

## (undated) DEVICE — BNDG,ELSTC,MATRIX,STRL,2"X5YD,LF,HOOK&LP: Brand: MEDLINE

## (undated) DEVICE — SURGICAL PROCEDURE PACK HND

## (undated) DEVICE — GLOVE SURG SZ 9 L12IN FNGR THK13MIL WHT ISOLEX POLYISOPRENE

## (undated) DEVICE — SINGLE USE DEVICE INTENDED TO COVER EXPOSED ENDS OF ORTHOPEDIC PIN AND K-WIRES TO HELP PROTECT THE EXPOSED WIRE FROM SNAGGING ON CLOTHING.: Brand: OXBORO™ PIN COVER

## (undated) DEVICE — Device

## (undated) DEVICE — BLADE,STAINLESS-STEEL,15,STRL,DISPOSABLE: Brand: MEDLINE

## (undated) DEVICE — DRILL SYSTEM 7

## (undated) DEVICE — ADHESIVE SKIN CLSR 0.7ML TOP DERMBND ADV

## (undated) DEVICE — GOWN,SIRUS,FABRNF,XL,20/CS: Brand: MEDLINE

## (undated) DEVICE — PADDING,UNDERCAST,COTTON, 4"X4YD STERILE: Brand: MEDLINE

## (undated) DEVICE — SET ORTHO MINI IMPL FRAG